# Patient Record
Sex: MALE | Race: ASIAN | NOT HISPANIC OR LATINO | Employment: UNEMPLOYED | URBAN - METROPOLITAN AREA
[De-identification: names, ages, dates, MRNs, and addresses within clinical notes are randomized per-mention and may not be internally consistent; named-entity substitution may affect disease eponyms.]

---

## 2020-06-17 ENCOUNTER — HOSPITAL ENCOUNTER (INPATIENT)
Facility: CLINIC | Age: 20
LOS: 3 days | Discharge: HOME | DRG: 638 | End: 2020-06-20
Attending: FAMILY MEDICINE | Admitting: INTERNAL MEDICINE
Payer: OTHER GOVERNMENT

## 2020-06-17 ENCOUNTER — APPOINTMENT (OUTPATIENT)
Dept: CT IMAGING | Facility: CLINIC | Age: 20
DRG: 638 | End: 2020-06-17
Attending: FAMILY MEDICINE

## 2020-06-17 DIAGNOSIS — E08.10 DIABETIC KETOACIDOSIS WITHOUT COMA ASSOCIATED WITH DIABETES MELLITUS DUE TO UNDERLYING CONDITION (H): ICD-10-CM

## 2020-06-17 DIAGNOSIS — E10.10 DIABETIC KETOACIDOSIS WITHOUT COMA ASSOCIATED WITH TYPE 1 DIABETES MELLITUS (H): ICD-10-CM

## 2020-06-17 DIAGNOSIS — E11.9 NEWLY DIAGNOSED DIABETES (H): Primary | ICD-10-CM

## 2020-06-17 DIAGNOSIS — Z03.818 ENCNTR FOR OBS FOR SUSP EXPSR TO OTH BIOLG AGENTS RULED OUT: ICD-10-CM

## 2020-06-17 PROBLEM — E66.01 MORBID OBESITY (H): Status: ACTIVE | Noted: 2020-06-17

## 2020-06-17 PROBLEM — R79.89 ELEVATED D-DIMER: Status: ACTIVE | Noted: 2020-06-17

## 2020-06-17 LAB
ALBUMIN SERPL-MCNC: 4.7 G/DL (ref 3.4–5)
ALBUMIN UR-MCNC: 100 MG/DL
ALP SERPL-CCNC: 145 U/L (ref 65–260)
ALT SERPL W P-5'-P-CCNC: 96 U/L (ref 0–50)
ANION GAP SERPL CALCULATED.3IONS-SCNC: 21 MMOL/L (ref 3–14)
ANION GAP SERPL CALCULATED.3IONS-SCNC: 24 MMOL/L (ref 3–14)
APPEARANCE UR: CLEAR
AST SERPL W P-5'-P-CCNC: 24 U/L (ref 0–35)
BASE DEFICIT BLDV-SCNC: 21.5 MMOL/L
BASOPHILS # BLD AUTO: 0.1 10E9/L (ref 0–0.2)
BASOPHILS NFR BLD AUTO: 0.5 %
BILIRUB DIRECT SERPL-MCNC: 0.2 MG/DL (ref 0–0.2)
BILIRUB SERPL-MCNC: 0.7 MG/DL (ref 0.2–1.3)
BILIRUB UR QL STRIP: NEGATIVE
BUN SERPL-MCNC: 10 MG/DL (ref 7–30)
BUN SERPL-MCNC: 12 MG/DL (ref 7–30)
CALCIUM SERPL-MCNC: 8.5 MG/DL (ref 8.5–10.1)
CALCIUM SERPL-MCNC: 9.4 MG/DL (ref 8.5–10.1)
CHLORIDE SERPL-SCNC: 101 MMOL/L (ref 98–110)
CHLORIDE SERPL-SCNC: 106 MMOL/L (ref 98–110)
CO2 SERPL-SCNC: 8 MMOL/L (ref 20–32)
CO2 SERPL-SCNC: 9 MMOL/L (ref 20–32)
COLOR UR AUTO: YELLOW
CREAT SERPL-MCNC: 0.72 MG/DL (ref 0.5–1)
CREAT SERPL-MCNC: 0.86 MG/DL (ref 0.5–1)
D DIMER PPP FEU-MCNC: 0.8 UG/ML FEU (ref 0–0.5)
DIFFERENTIAL METHOD BLD: ABNORMAL
EOSINOPHIL # BLD AUTO: 0 10E9/L (ref 0–0.7)
EOSINOPHIL NFR BLD AUTO: 0.1 %
ERYTHROCYTE [DISTWIDTH] IN BLOOD BY AUTOMATED COUNT: 13.1 % (ref 10–15)
GFR SERPL CREATININE-BSD FRML MDRD: >90 ML/MIN/{1.73_M2}
GFR SERPL CREATININE-BSD FRML MDRD: >90 ML/MIN/{1.73_M2}
GLUCOSE BLDC GLUCOMTR-MCNC: 304 MG/DL (ref 70–99)
GLUCOSE BLDC GLUCOMTR-MCNC: 394 MG/DL (ref 70–99)
GLUCOSE BLDC GLUCOMTR-MCNC: 458 MG/DL (ref 70–99)
GLUCOSE BLDC GLUCOMTR-MCNC: 596 MG/DL (ref 70–99)
GLUCOSE SERPL-MCNC: 324 MG/DL (ref 70–99)
GLUCOSE SERPL-MCNC: 574 MG/DL (ref 70–99)
GLUCOSE UR STRIP-MCNC: >499 MG/DL
HBA1C MFR BLD: 11.3 % (ref 0–5.6)
HCO3 BLDV-SCNC: 6 MMOL/L (ref 21–28)
HCO3 BLDV-SCNC: 7 MMOL/L (ref 21–28)
HCT VFR BLD AUTO: 55.4 % (ref 40–53)
HGB BLD-MCNC: 18.2 G/DL (ref 13.3–17.7)
HGB UR QL STRIP: ABNORMAL
IMM GRANULOCYTES # BLD: 0.4 10E9/L (ref 0–0.4)
IMM GRANULOCYTES NFR BLD: 2.4 %
KETONES BLD-SCNC: 4.6 MMOL/L (ref 0–0.6)
KETONES BLD-SCNC: 5.1 MMOL/L (ref 0–0.6)
KETONES UR STRIP-MCNC: 80 MG/DL
LACTATE BLD-SCNC: 1.9 MMOL/L (ref 0.7–2)
LACTATE BLD-SCNC: 2.8 MMOL/L (ref 0.7–2)
LEUKOCYTE ESTERASE UR QL STRIP: NEGATIVE
LYMPHOCYTES # BLD AUTO: 2.1 10E9/L (ref 0.8–5.3)
LYMPHOCYTES NFR BLD AUTO: 12 %
MAGNESIUM SERPL-MCNC: 2.3 MG/DL (ref 1.6–2.3)
MAGNESIUM SERPL-MCNC: 2.7 MG/DL (ref 1.6–2.3)
MCH RBC QN AUTO: 28.6 PG (ref 26.5–33)
MCHC RBC AUTO-ENTMCNC: 32.9 G/DL (ref 31.5–36.5)
MCV RBC AUTO: 87 FL (ref 78–100)
MONOCYTES # BLD AUTO: 1.4 10E9/L (ref 0–1.3)
MONOCYTES NFR BLD AUTO: 8.2 %
MUCOUS THREADS #/AREA URNS LPF: PRESENT /LPF
NEUTROPHILS # BLD AUTO: 13.4 10E9/L (ref 1.6–8.3)
NEUTROPHILS NFR BLD AUTO: 76.8 %
NITRATE UR QL: NEGATIVE
NRBC # BLD AUTO: 0 10*3/UL
NRBC BLD AUTO-RTO: 0 /100
O2/TOTAL GAS SETTING VFR VENT: 21 %
O2/TOTAL GAS SETTING VFR VENT: ABNORMAL %
OSMOLALITY SERPL: 333 MMOL/KG (ref 275–295)
PCO2 BLDV: 18 MM HG (ref 40–50)
PCO2 BLDV: 24 MM HG (ref 40–50)
PH BLDV: 7.09 PH (ref 7.32–7.43)
PH BLDV: 7.12 PH (ref 7.32–7.43)
PH UR STRIP: 5 PH (ref 5–7)
PHOSPHATE SERPL-MCNC: 1.5 MG/DL (ref 2.5–4.5)
PHOSPHATE SERPL-MCNC: 2.9 MG/DL (ref 2.5–4.5)
PLATELET # BLD AUTO: 360 10E9/L (ref 150–450)
PLATELET # BLD AUTO: 436 10E9/L (ref 150–450)
PO2 BLDV: 38 MM HG (ref 25–47)
PO2 BLDV: 52 MM HG (ref 25–47)
POTASSIUM SERPL-SCNC: 3.9 MMOL/L (ref 3.4–5.3)
POTASSIUM SERPL-SCNC: 4.7 MMOL/L (ref 3.4–5.3)
PROT SERPL-MCNC: 8.8 G/DL (ref 6.8–8.8)
RBC # BLD AUTO: 6.36 10E12/L (ref 4.4–5.9)
RBC #/AREA URNS AUTO: 1 /HPF (ref 0–2)
SARS-COV-2 PCR COMMENT: NORMAL
SARS-COV-2 RNA SPEC QL NAA+PROBE: NEGATIVE
SARS-COV-2 RNA SPEC QL NAA+PROBE: NORMAL
SODIUM SERPL-SCNC: 134 MMOL/L (ref 133–144)
SODIUM SERPL-SCNC: 135 MMOL/L (ref 133–144)
SOURCE: ABNORMAL
SP GR UR STRIP: 1.03 (ref 1–1.03)
SPECIMEN SOURCE: NORMAL
SPECIMEN SOURCE: NORMAL
SQUAMOUS #/AREA URNS AUTO: 1 /HPF (ref 0–1)
TROPONIN I SERPL-MCNC: <0.015 UG/L (ref 0–0.04)
UROBILINOGEN UR STRIP-MCNC: 0 MG/DL (ref 0–2)
WBC # BLD AUTO: 17.4 10E9/L (ref 4–11)
WBC #/AREA URNS AUTO: <1 /HPF (ref 0–5)

## 2020-06-17 PROCEDURE — 85049 AUTOMATED PLATELET COUNT: CPT | Performed by: PHYSICIAN ASSISTANT

## 2020-06-17 PROCEDURE — 25800030 ZZH RX IP 258 OP 636: Performed by: FAMILY MEDICINE

## 2020-06-17 PROCEDURE — 86341 ISLET CELL ANTIBODY: CPT | Performed by: PHYSICIAN ASSISTANT

## 2020-06-17 PROCEDURE — 99223 1ST HOSP IP/OBS HIGH 75: CPT | Mod: AI | Performed by: PHYSICIAN ASSISTANT

## 2020-06-17 PROCEDURE — 83930 ASSAY OF BLOOD OSMOLALITY: CPT | Performed by: FAMILY MEDICINE

## 2020-06-17 PROCEDURE — C9803 HOPD COVID-19 SPEC COLLECT: HCPCS | Performed by: FAMILY MEDICINE

## 2020-06-17 PROCEDURE — 83605 ASSAY OF LACTIC ACID: CPT | Performed by: PHYSICIAN ASSISTANT

## 2020-06-17 PROCEDURE — 85025 COMPLETE CBC W/AUTO DIFF WBC: CPT | Performed by: FAMILY MEDICINE

## 2020-06-17 PROCEDURE — 80048 BASIC METABOLIC PNL TOTAL CA: CPT | Performed by: FAMILY MEDICINE

## 2020-06-17 PROCEDURE — 99285 EMERGENCY DEPT VISIT HI MDM: CPT | Mod: 25 | Performed by: FAMILY MEDICINE

## 2020-06-17 PROCEDURE — 85379 FIBRIN DEGRADATION QUANT: CPT | Performed by: FAMILY MEDICINE

## 2020-06-17 PROCEDURE — 84100 ASSAY OF PHOSPHORUS: CPT | Performed by: FAMILY MEDICINE

## 2020-06-17 PROCEDURE — 25000128 H RX IP 250 OP 636: Performed by: INTERNAL MEDICINE

## 2020-06-17 PROCEDURE — 36415 COLL VENOUS BLD VENIPUNCTURE: CPT | Performed by: PHYSICIAN ASSISTANT

## 2020-06-17 PROCEDURE — 82803 BLOOD GASES ANY COMBINATION: CPT | Performed by: PHYSICIAN ASSISTANT

## 2020-06-17 PROCEDURE — 82803 BLOOD GASES ANY COMBINATION: CPT | Performed by: FAMILY MEDICINE

## 2020-06-17 PROCEDURE — 20000003 ZZH R&B ICU

## 2020-06-17 PROCEDURE — 93005 ELECTROCARDIOGRAM TRACING: CPT | Performed by: FAMILY MEDICINE

## 2020-06-17 PROCEDURE — 96366 THER/PROPH/DIAG IV INF ADDON: CPT | Performed by: FAMILY MEDICINE

## 2020-06-17 PROCEDURE — 25000131 ZZH RX MED GY IP 250 OP 636 PS 637: Performed by: FAMILY MEDICINE

## 2020-06-17 PROCEDURE — 83735 ASSAY OF MAGNESIUM: CPT | Performed by: FAMILY MEDICINE

## 2020-06-17 PROCEDURE — 25000128 H RX IP 250 OP 636: Performed by: PHYSICIAN ASSISTANT

## 2020-06-17 PROCEDURE — 25800029 ZZH RX IP 258 OP 250: Performed by: FAMILY MEDICINE

## 2020-06-17 PROCEDURE — 25000125 ZZHC RX 250: Performed by: FAMILY MEDICINE

## 2020-06-17 PROCEDURE — 99285 EMERGENCY DEPT VISIT HI MDM: CPT | Mod: Z6 | Performed by: FAMILY MEDICINE

## 2020-06-17 PROCEDURE — 96365 THER/PROPH/DIAG IV INF INIT: CPT | Mod: 59 | Performed by: FAMILY MEDICINE

## 2020-06-17 PROCEDURE — 71275 CT ANGIOGRAPHY CHEST: CPT

## 2020-06-17 PROCEDURE — 82010 KETONE BODYS QUAN: CPT | Performed by: FAMILY MEDICINE

## 2020-06-17 PROCEDURE — 83735 ASSAY OF MAGNESIUM: CPT | Performed by: PHYSICIAN ASSISTANT

## 2020-06-17 PROCEDURE — 80076 HEPATIC FUNCTION PANEL: CPT | Performed by: PHYSICIAN ASSISTANT

## 2020-06-17 PROCEDURE — U0003 INFECTIOUS AGENT DETECTION BY NUCLEIC ACID (DNA OR RNA); SEVERE ACUTE RESPIRATORY SYNDROME CORONAVIRUS 2 (SARS-COV-2) (CORONAVIRUS DISEASE [COVID-19]), AMPLIFIED PROBE TECHNIQUE, MAKING USE OF HIGH THROUGHPUT TECHNOLOGIES AS DESCRIBED BY CMS-2020-01-R: HCPCS | Performed by: FAMILY MEDICINE

## 2020-06-17 PROCEDURE — 83605 ASSAY OF LACTIC ACID: CPT | Performed by: FAMILY MEDICINE

## 2020-06-17 PROCEDURE — 80048 BASIC METABOLIC PNL TOTAL CA: CPT | Performed by: PHYSICIAN ASSISTANT

## 2020-06-17 PROCEDURE — 00000146 ZZHCL STATISTIC GLUCOSE BY METER IP

## 2020-06-17 PROCEDURE — 25000128 H RX IP 250 OP 636: Performed by: FAMILY MEDICINE

## 2020-06-17 PROCEDURE — 84100 ASSAY OF PHOSPHORUS: CPT | Performed by: PHYSICIAN ASSISTANT

## 2020-06-17 PROCEDURE — 82010 KETONE BODYS QUAN: CPT | Performed by: PHYSICIAN ASSISTANT

## 2020-06-17 PROCEDURE — 83036 HEMOGLOBIN GLYCOSYLATED A1C: CPT | Performed by: FAMILY MEDICINE

## 2020-06-17 PROCEDURE — 81001 URINALYSIS AUTO W/SCOPE: CPT | Performed by: PHYSICIAN ASSISTANT

## 2020-06-17 PROCEDURE — 84484 ASSAY OF TROPONIN QUANT: CPT | Performed by: FAMILY MEDICINE

## 2020-06-17 PROCEDURE — 96361 HYDRATE IV INFUSION ADD-ON: CPT | Performed by: FAMILY MEDICINE

## 2020-06-17 RX ORDER — NALOXONE HYDROCHLORIDE 0.4 MG/ML
.1-.4 INJECTION, SOLUTION INTRAMUSCULAR; INTRAVENOUS; SUBCUTANEOUS
Status: DISCONTINUED | OUTPATIENT
Start: 2020-06-17 | End: 2020-06-20 | Stop reason: HOSPADM

## 2020-06-17 RX ORDER — AMOXICILLIN 250 MG
2 CAPSULE ORAL 2 TIMES DAILY PRN
Status: DISCONTINUED | OUTPATIENT
Start: 2020-06-17 | End: 2020-06-20 | Stop reason: HOSPADM

## 2020-06-17 RX ORDER — NICOTINE POLACRILEX 4 MG
15-30 LOZENGE BUCCAL
Status: DISCONTINUED | OUTPATIENT
Start: 2020-06-17 | End: 2020-06-19

## 2020-06-17 RX ORDER — DEXTROSE MONOHYDRATE 25 G/50ML
25-50 INJECTION, SOLUTION INTRAVENOUS
Status: CANCELLED | OUTPATIENT
Start: 2020-06-17

## 2020-06-17 RX ORDER — DEXTROSE MONOHYDRATE, SODIUM CHLORIDE, AND POTASSIUM CHLORIDE 50; 1.49; 4.5 G/1000ML; G/1000ML; G/1000ML
INJECTION, SOLUTION INTRAVENOUS CONTINUOUS
Status: DISCONTINUED | OUTPATIENT
Start: 2020-06-17 | End: 2020-06-19

## 2020-06-17 RX ORDER — DEXTROSE MONOHYDRATE 25 G/50ML
25-50 INJECTION, SOLUTION INTRAVENOUS
Status: DISCONTINUED | OUTPATIENT
Start: 2020-06-17 | End: 2020-06-17

## 2020-06-17 RX ORDER — HYDROMORPHONE HYDROCHLORIDE 1 MG/ML
.3-.5 INJECTION, SOLUTION INTRAMUSCULAR; INTRAVENOUS; SUBCUTANEOUS
Status: DISCONTINUED | OUTPATIENT
Start: 2020-06-17 | End: 2020-06-20 | Stop reason: HOSPADM

## 2020-06-17 RX ORDER — AMOXICILLIN 250 MG
1 CAPSULE ORAL 2 TIMES DAILY PRN
Status: DISCONTINUED | OUTPATIENT
Start: 2020-06-17 | End: 2020-06-20 | Stop reason: HOSPADM

## 2020-06-17 RX ORDER — SODIUM CHLORIDE AND POTASSIUM CHLORIDE 150; 450 MG/100ML; MG/100ML
INJECTION, SOLUTION INTRAVENOUS CONTINUOUS
Status: DISCONTINUED | OUTPATIENT
Start: 2020-06-17 | End: 2020-06-19

## 2020-06-17 RX ORDER — IOPAMIDOL 755 MG/ML
96 INJECTION, SOLUTION INTRAVASCULAR ONCE
Status: COMPLETED | OUTPATIENT
Start: 2020-06-17 | End: 2020-06-17

## 2020-06-17 RX ORDER — ONDANSETRON 2 MG/ML
4 INJECTION INTRAMUSCULAR; INTRAVENOUS EVERY 30 MIN PRN
Status: CANCELLED | OUTPATIENT
Start: 2020-06-17

## 2020-06-17 RX ORDER — PROCHLORPERAZINE 25 MG
25 SUPPOSITORY, RECTAL RECTAL EVERY 12 HOURS PRN
Status: DISCONTINUED | OUTPATIENT
Start: 2020-06-17 | End: 2020-06-20 | Stop reason: HOSPADM

## 2020-06-17 RX ORDER — DEXTROSE MONOHYDRATE 25 G/50ML
25-50 INJECTION, SOLUTION INTRAVENOUS
Status: DISCONTINUED | OUTPATIENT
Start: 2020-06-17 | End: 2020-06-19

## 2020-06-17 RX ORDER — PROCHLORPERAZINE MALEATE 10 MG
10 TABLET ORAL EVERY 6 HOURS PRN
Status: DISCONTINUED | OUTPATIENT
Start: 2020-06-17 | End: 2020-06-20 | Stop reason: HOSPADM

## 2020-06-17 RX ORDER — SODIUM CHLORIDE 450 MG/100ML
1000 INJECTION, SOLUTION INTRAVENOUS CONTINUOUS
Status: DISCONTINUED | OUTPATIENT
Start: 2020-06-17 | End: 2020-06-17

## 2020-06-17 RX ORDER — ONDANSETRON 2 MG/ML
4 INJECTION INTRAMUSCULAR; INTRAVENOUS EVERY 6 HOURS PRN
Status: DISCONTINUED | OUTPATIENT
Start: 2020-06-17 | End: 2020-06-20 | Stop reason: HOSPADM

## 2020-06-17 RX ORDER — ONDANSETRON 2 MG/ML
4 INJECTION INTRAMUSCULAR; INTRAVENOUS EVERY 30 MIN PRN
Status: DISCONTINUED | OUTPATIENT
Start: 2020-06-17 | End: 2020-06-17

## 2020-06-17 RX ORDER — ONDANSETRON 4 MG/1
4 TABLET, ORALLY DISINTEGRATING ORAL EVERY 6 HOURS PRN
Status: DISCONTINUED | OUTPATIENT
Start: 2020-06-17 | End: 2020-06-20 | Stop reason: HOSPADM

## 2020-06-17 RX ORDER — POLYETHYLENE GLYCOL 3350 17 G/17G
17 POWDER, FOR SOLUTION ORAL DAILY PRN
Status: DISCONTINUED | OUTPATIENT
Start: 2020-06-17 | End: 2020-06-20 | Stop reason: HOSPADM

## 2020-06-17 RX ADMIN — SODIUM CHLORIDE 1000 ML: 9 INJECTION, SOLUTION INTRAVENOUS at 17:28

## 2020-06-17 RX ADMIN — SODIUM CHLORIDE 1000 ML: 4.5 INJECTION, SOLUTION INTRAVENOUS at 18:12

## 2020-06-17 RX ADMIN — POTASSIUM CHLORIDE AND SODIUM CHLORIDE: 450; 150 INJECTION, SOLUTION INTRAVENOUS at 23:14

## 2020-06-17 RX ADMIN — IOPAMIDOL 96 ML: 755 INJECTION, SOLUTION INTRAVENOUS at 19:44

## 2020-06-17 RX ADMIN — SODIUM CHLORIDE: 9 INJECTION, SOLUTION INTRAVENOUS at 18:08

## 2020-06-17 RX ADMIN — SODIUM CHLORIDE 100 ML: 9 INJECTION, SOLUTION INTRAVENOUS at 19:45

## 2020-06-17 RX ADMIN — ENOXAPARIN SODIUM 40 MG: 40 INJECTION SUBCUTANEOUS at 23:14

## 2020-06-17 SDOH — HEALTH STABILITY: MENTAL HEALTH: HOW OFTEN DO YOU HAVE A DRINK CONTAINING ALCOHOL?: 2-4 TIMES A MONTH

## 2020-06-17 ASSESSMENT — ENCOUNTER SYMPTOMS
HEMATOLOGIC/LYMPHATIC NEGATIVE: 1
CHILLS: 1
MUSCULOSKELETAL NEGATIVE: 1
FEVER: 0
DIAPHORESIS: 1
UNEXPECTED WEIGHT CHANGE: 0
ACTIVITY CHANGE: 1
GASTROINTESTINAL NEGATIVE: 1
FATIGUE: 1
SHORTNESS OF BREATH: 1
ENDOCRINE NEGATIVE: 1
LIGHT-HEADEDNESS: 1
ALLERGIC/IMMUNOLOGIC NEGATIVE: 1
APPETITE CHANGE: 1
PSYCHIATRIC NEGATIVE: 1
EYE PAIN: 1

## 2020-06-17 ASSESSMENT — ACTIVITIES OF DAILY LIVING (ADL)
SWALLOWING: 0-->SWALLOWS FOODS/LIQUIDS WITHOUT DIFFICULTY
AMBULATION: 0-->INDEPENDENT
BATHING: 0-->INDEPENDENT
TRANSFERRING: 0-->INDEPENDENT
TOILETING: 0-->INDEPENDENT
FALL_HISTORY_WITHIN_LAST_SIX_MONTHS: NO
COGNITION: 0 - NO COGNITION ISSUES REPORTED
RETIRED_EATING: 0-->INDEPENDENT
RETIRED_COMMUNICATION: 0-->UNDERSTANDS/COMMUNICATES WITHOUT DIFFICULTY
DRESS: 0-->INDEPENDENT

## 2020-06-17 ASSESSMENT — MIFFLIN-ST. JEOR
SCORE: 2269.63
SCORE: 2352.56

## 2020-06-17 NOTE — ED NOTES
Pt here with tachycardia, chest pain and shortness of breath for the past 3 days. Also states that for the past week he has been excessively thirsty, drinking a lot of water and urinating frequently. Father is diabetic. Denies cough, fevers or rash.

## 2020-06-17 NOTE — ED NOTES
Obtained venous blood glucose, came back as 596. Doctor and nurse were in the room and noticed immediately

## 2020-06-17 NOTE — ED PROVIDER NOTES
History     Chief Complaint   Patient presents with     Tachycardia     pt states he's been drinking a lot lately and urinating a lot. has a dry mouth. does have chest pain and SOA with HR of 140s. family hx of diabetes     HPI  Mynor Rollins is a 19 year old male, past medical history is significant for obesity, presents to the emergency department with concerns of rapid heartbeat, chest pain, shortness of breath x3 days.  History is obtained from the patient who states that the past 1 to 2 weeks he has been drinking a lot of water and other liquids as well as urinating profusely, he always seems to be dry and have a dry mouth.  Over the last 3 days beginning while he was playing a video game he started with central dull achy chest pain that has never gone below a 7/10.  It is associated with increased shortness of breath especially with exertion which is new over the last 3 days.  He notes no cough, no fever, no chills or sweats.  He is taken nothing for the pain.  He denies any trauma or injury to his chest.  He has not had symptoms like this before.  He notes that his father is a type II diabetic on insulin.  He notes no urinary urgency, dysuria or hematuria.  His urine has always appeared pale yellow.  Identified no abdominal pain, formed bowel movement yesterday no change in bowel habits.      Allergies:  No Known Allergies    Problem List:    There are no active problems to display for this patient.       Past Medical History:    No past medical history on file.    Past Surgical History:    No past surgical history on file.    Family History:    No family history on file.    Social History:  Marital Status:  Single [1]  Social History     Tobacco Use     Smoking status: Not on file   Substance Use Topics     Alcohol use: Not on file     Drug use: Not on file        Medications:    No current outpatient medications on file.        Review of Systems   Constitutional: Positive for activity change, appetite  "change, chills, diaphoresis and fatigue. Negative for fever and unexpected weight change.   HENT: Negative.    Eyes: Positive for pain.        Eye discomfort not pain, not really itchy.   Respiratory: Positive for shortness of breath.    Cardiovascular: Positive for chest pain.   Gastrointestinal: Negative.    Endocrine: Negative.    Genitourinary: Negative.    Musculoskeletal: Negative.    Skin: Negative.    Allergic/Immunologic: Negative.    Neurological: Positive for light-headedness.   Hematological: Negative.    Psychiatric/Behavioral: Negative.        Physical Exam   BP: (!) 158/116  Pulse: 145  Heart Rate: 149  Temp: 98  F (36.7  C)  Resp: 22  Height: 170.2 cm (5' 7\")  Weight: 137.9 kg (304 lb)(stated)  SpO2: 98 %      Physical Exam  Vitals signs and nursing note reviewed.   Constitutional:       General: He is in acute distress.      Appearance: He is obese. He is ill-appearing and diaphoretic.      Comments: Alert, ill in appearance, bilateral ciliary flush.  Oriented x3, GCS 15.   HENT:      Head: Normocephalic and atraumatic.      Right Ear: Tympanic membrane, ear canal and external ear normal.      Left Ear: Tympanic membrane, ear canal and external ear normal.      Nose: Nose normal.      Mouth/Throat:      Mouth: Mucous membranes are dry.      Pharynx: Oropharynx is clear.   Eyes:      Extraocular Movements: Extraocular movements intact.      Conjunctiva/sclera: Conjunctivae normal.      Pupils: Pupils are equal, round, and reactive to light.   Neck:      Musculoskeletal: Normal range of motion and neck supple.   Cardiovascular:      Rate and Rhythm: Regular rhythm. Tachycardia present.      Pulses: Normal pulses.      Heart sounds: Normal heart sounds.   Pulmonary:      Effort: Pulmonary effort is normal.      Breath sounds: Normal breath sounds.   Abdominal:      Palpations: Abdomen is soft.      Comments: Obese abdomen active bowel sounds nontender.   Musculoskeletal: Normal range of motion. "   Skin:     General: Skin is warm.      Capillary Refill: Capillary refill takes less than 2 seconds.   Neurological:      General: No focal deficit present.      Mental Status: He is alert and oriented to person, place, and time. Mental status is at baseline.   Psychiatric:         Mood and Affect: Mood normal.         Behavior: Behavior normal.         ED Course        Procedures               Critical Care time:  none               Results for orders placed or performed during the hospital encounter of 06/17/20 (from the past 24 hour(s))   Glucose by meter   Result Value Ref Range    Glucose 596 (HH) 70 - 99 mg/dL   CBC with platelets differential   Result Value Ref Range    WBC 17.4 (H) 4.0 - 11.0 10e9/L    RBC Count 6.36 (H) 4.4 - 5.9 10e12/L    Hemoglobin 18.2 (H) 13.3 - 17.7 g/dL    Hematocrit 55.4 (H) 40.0 - 53.0 %    MCV 87 78 - 100 fl    MCH 28.6 26.5 - 33.0 pg    MCHC 32.9 31.5 - 36.5 g/dL    RDW 13.1 10.0 - 15.0 %    Platelet Count 436 150 - 450 10e9/L    Diff Method Automated Method     % Neutrophils 76.8 %    % Lymphocytes 12.0 %    % Monocytes 8.2 %    % Eosinophils 0.1 %    % Basophils 0.5 %    % Immature Granulocytes 2.4 %    Nucleated RBCs 0 0 /100    Absolute Neutrophil 13.4 (H) 1.6 - 8.3 10e9/L    Absolute Lymphocytes 2.1 0.8 - 5.3 10e9/L    Absolute Monocytes 1.4 (H) 0.0 - 1.3 10e9/L    Absolute Eosinophils 0.0 0.0 - 0.7 10e9/L    Absolute Basophils 0.1 0.0 - 0.2 10e9/L    Abs Immature Granulocytes 0.4 0 - 0.4 10e9/L    Absolute Nucleated RBC 0.0    Hemoglobin A1c   Result Value Ref Range    Hemoglobin A1C 11.3 (H) 0 - 5.6 %   Phosphorus   Result Value Ref Range    Phosphorus 2.9 2.5 - 4.5 mg/dL   Magnesium   Result Value Ref Range    Magnesium 2.7 (H) 1.6 - 2.3 mg/dL   Lactic acid whole blood   Result Value Ref Range    Lactic Acid 2.8 (H) 0.7 - 2.0 mmol/L   Troponin I   Result Value Ref Range    Troponin I ES <0.015 0.000 - 0.045 ug/L   Blood gas venous   Result Value Ref Range    Ph Venous  7.09 (LL) 7.32 - 7.43 pH    PCO2 Venous 24 (L) 40 - 50 mm Hg    PO2 Venous 38 25 - 47 mm Hg    Bicarbonate Venous 7 (LL) 21 - 28 mmol/L    FIO2 HIDE    Osmolality   Result Value Ref Range    Osmolality 333 (H) 275 - 295 mmol/kg   Ketone Beta-Hydroxybutyrate Quantitative   Result Value Ref Range    Ketone Quantitative 4.6 (HH) 0.0 - 0.6 mmol/L   Basic metabolic panel   Result Value Ref Range    Sodium 134 133 - 144 mmol/L    Potassium 4.7 3.4 - 5.3 mmol/L    Chloride 101 98 - 110 mmol/L    Carbon Dioxide 9 (LL) 20 - 32 mmol/L    Anion Gap 24 (H) 3 - 14 mmol/L    Glucose 574 (HH) 70 - 99 mg/dL    Urea Nitrogen 12 7 - 30 mg/dL    Creatinine 0.86 0.50 - 1.00 mg/dL    GFR Estimate >90 >60 mL/min/[1.73_m2]    GFR Estimate If Black >90 >60 mL/min/[1.73_m2]    Calcium 9.4 8.5 - 10.1 mg/dL   Symptomatic COVID-19 Virus (Coronavirus) by PCR    Specimen: Nasopharyngeal   Result Value Ref Range    COVID-19 Virus PCR to [facility] - Source Nasopharyngeal     COVID-19 Virus PCR to [facility] - Result       Test received-See reflex to IDDL test SARS CoV2 (COVID-19) Virus RT-PCR   SARS-CoV-2 COVID-19 Virus (Coronavirus) RT-PCR Nasopharyngeal    Specimen: Nasopharyngeal   Result Value Ref Range    SARS-CoV-2 Virus Specimen Source Nasopharyngeal     SARS-CoV-2 PCR Result NEGATIVE     SARS-CoV-2 PCR Comment       This automated, real-time RT-PCR assay by [employer] on the [employer] has   been given Emergency Use Authorization (EUA) for the in vitro qualitative detection of RNA   from the SARS-CoV2 virus in nasopharyngeal swabs in viral transport medium from patients   with signs and symptoms of infection who are suspected of COVID-19. The performance is   unknown in asymptomatic patients.     D dimer quantitative   Result Value Ref Range    D Dimer 0.8 (H) 0.0 - 0.50 ug/ml FEU   Glucose by meter   Result Value Ref Range    Glucose 458 (H) 70 - 99 mg/dL   CT Chest Pulmonary Embolism w Contrast    Narrative    EXAM: CT CHEST  PULMONARY EMBOLISM W CONTRAST  LOCATION: [facility]  DATE/TIME: 6/17/2020 7:37 PM    INDICATION: Chest pain, shortness of breath, DKA  COMPARISON: None.  TECHNIQUE: CT chest pulmonary angiogram during arterial phase injection of IV contrast. Multiplanar reformats and MIP reconstructions were performed. Dose reduction techniques were used.   CONTRAST: 96 ml Isovue 370    FINDINGS:  ANGIOGRAM CHEST: Pulmonary arteries are normal caliber and negative for pulmonary emboli. Thoracic aorta is negative for dissection. No CT evidence of right heart strain.    LUNGS AND PLEURA: Evidence for remote granulomatous disease. No infiltrate or effusion.    MEDIASTINUM/AXILLAE: Normal.    UPPER ABDOMEN: Dense hepatic steatosis.    MUSCULOSKELETAL: Normal.      Impression    IMPRESSION:  1.  Dense hepatic steatosis.  2.  No pulmonary embolus, aortic aneurysm or dissection.       Medications   dextrose 5% and 0.45% NaCl infusion (has no administration in time range)   dextrose 50 % injection 25-50 mL (has no administration in time range)   0.9% sodium chloride BOLUS (0 mLs Intravenous Stopped 6/17/20 1812)     Followed by   0.45% sodium chloride infusion ( Intravenous Restarted 6/17/20 2017)   ondansetron (ZOFRAN) injection 4 mg (has no administration in time range)   insulin 1 unit/1 mL in NS (NovoLIN, HumuLIN Regular) drip -ED DKA algorithm (5.5 Units/hr Intravenous Restarted 6/17/20 2011)   dextrose 50 % injection 25-50 mL (has no administration in time range)   iopamidol (ISOVUE-370) solution 96 mL (96 mLs Intravenous Given 6/17/20 1944)   sodium chloride 0.9 % bag 500mL for CT scan flush use (100 mLs As instructed Given 6/17/20 1945)     9:42 PM  I spoke with the patient regarding all of his diagnostics performed at this point.  He is feeling significantly better after fluids and insulin infusion here in the emergency department.  I recommended admission to him and discussed him with the on-call hospitalist  [facility] guard who is agreeable to accepting his care for on-call hospitalist  to the ICU.  Transition orders were placed by myself.    Assessments & Plan (with Medical Decision Making)   19-year-old male past medical history significant for obesity who presents to the emergency department with approximately 2 weeks of polyuria/polydipsia as well as some associated more recent development of rapid heartbeat, chest pain and shortness of breath.  The patient was initially seen in a normal emergency department room but then after his evaluation was moved to PCU I until COVID swab testing was obtained and resulted as verbally negative by lab.  His lab diagnostics are reviewed as above revealing significant metabolic acidosis, anion gap, ketonemia, hyperglycemia all of which is consistent with DKA.  Orders were placed and his evaluation proceeded to include CT of the chest as well given his chest pain, EKG and cardiac troponin all of which were negative for acute process.  He was discussed with our hospitalist service here for admission for ongoing management of his metabolic crisis.  Transition orders were placed by myself in the emergency department.      Disclaimer: This note consists of symbols derived from keyboarding, dictation and/or voice recognition software. As a result, there may be errors in the script that have gone undetected. Please consider this when interpreting information found in this chart.      I have reviewed the nursing notes.    I have reviewed the findings, diagnosis, plan and need for follow up with the patient.       New Prescriptions    No medications on file       Final diagnoses:   Diabetic ketoacidosis without coma associated with type 1 diabetes mellitus (H)       6/17/2020   [facility] EMERGENCY DEPARTMENT     Chandan Chapman MD  06/17/20 4431

## 2020-06-18 ENCOUNTER — DOCUMENTATION ONLY (OUTPATIENT)
Dept: OTHER | Facility: CLINIC | Age: 20
End: 2020-06-18

## 2020-06-18 LAB
ANION GAP SERPL CALCULATED.3IONS-SCNC: 11 MMOL/L (ref 3–14)
ANION GAP SERPL CALCULATED.3IONS-SCNC: 15 MMOL/L (ref 3–14)
ANION GAP SERPL CALCULATED.3IONS-SCNC: 15 MMOL/L (ref 3–14)
ANION GAP SERPL CALCULATED.3IONS-SCNC: 16 MMOL/L (ref 3–14)
ANION GAP SERPL CALCULATED.3IONS-SCNC: 18 MMOL/L (ref 3–14)
BASE DEFICIT BLDV-SCNC: 19 MMOL/L
BUN SERPL-MCNC: 7 MG/DL (ref 7–30)
BUN SERPL-MCNC: 8 MG/DL (ref 7–30)
BUN SERPL-MCNC: 8 MG/DL (ref 7–30)
BUN SERPL-MCNC: 9 MG/DL (ref 7–30)
CALCIUM SERPL-MCNC: 8.2 MG/DL (ref 8.5–10.1)
CALCIUM SERPL-MCNC: 8.3 MG/DL (ref 8.5–10.1)
CALCIUM SERPL-MCNC: 8.3 MG/DL (ref 8.5–10.1)
CALCIUM SERPL-MCNC: 8.5 MG/DL (ref 8.5–10.1)
CALCIUM SERPL-MCNC: 8.6 MG/DL (ref 8.5–10.1)
CHLORIDE SERPL-SCNC: 106 MMOL/L (ref 98–110)
CHLORIDE SERPL-SCNC: 108 MMOL/L (ref 98–110)
CHLORIDE SERPL-SCNC: 108 MMOL/L (ref 98–110)
CHLORIDE SERPL-SCNC: 109 MMOL/L (ref 98–110)
CHLORIDE SERPL-SCNC: 110 MMOL/L (ref 98–110)
CO2 SERPL-SCNC: 10 MMOL/L (ref 20–32)
CO2 SERPL-SCNC: 11 MMOL/L (ref 20–32)
CO2 SERPL-SCNC: 11 MMOL/L (ref 20–32)
CO2 SERPL-SCNC: 13 MMOL/L (ref 20–32)
CO2 SERPL-SCNC: 13 MMOL/L (ref 20–32)
CO2 SERPL-SCNC: 15 MMOL/L (ref 20–32)
CO2 SERPL-SCNC: 9 MMOL/L (ref 20–32)
CREAT SERPL-MCNC: 0.53 MG/DL (ref 0.5–1)
CREAT SERPL-MCNC: 0.54 MG/DL (ref 0.5–1)
CREAT SERPL-MCNC: 0.57 MG/DL (ref 0.5–1)
CREAT SERPL-MCNC: 0.6 MG/DL (ref 0.5–1)
CREAT SERPL-MCNC: 0.61 MG/DL (ref 0.5–1)
CREAT SERPL-MCNC: 0.61 MG/DL (ref 0.5–1)
CREAT SERPL-MCNC: 0.68 MG/DL (ref 0.5–1)
ERYTHROCYTE [DISTWIDTH] IN BLOOD BY AUTOMATED COUNT: 12.9 % (ref 10–15)
GFR SERPL CREATININE-BSD FRML MDRD: >90 ML/MIN/{1.73_M2}
GLUCOSE BLDC GLUCOMTR-MCNC: 184 MG/DL (ref 70–99)
GLUCOSE BLDC GLUCOMTR-MCNC: 204 MG/DL (ref 70–99)
GLUCOSE BLDC GLUCOMTR-MCNC: 207 MG/DL (ref 70–99)
GLUCOSE BLDC GLUCOMTR-MCNC: 225 MG/DL (ref 70–99)
GLUCOSE BLDC GLUCOMTR-MCNC: 228 MG/DL (ref 70–99)
GLUCOSE BLDC GLUCOMTR-MCNC: 242 MG/DL (ref 70–99)
GLUCOSE BLDC GLUCOMTR-MCNC: 247 MG/DL (ref 70–99)
GLUCOSE BLDC GLUCOMTR-MCNC: 247 MG/DL (ref 70–99)
GLUCOSE BLDC GLUCOMTR-MCNC: 257 MG/DL (ref 70–99)
GLUCOSE BLDC GLUCOMTR-MCNC: 261 MG/DL (ref 70–99)
GLUCOSE BLDC GLUCOMTR-MCNC: 263 MG/DL (ref 70–99)
GLUCOSE BLDC GLUCOMTR-MCNC: 268 MG/DL (ref 70–99)
GLUCOSE BLDC GLUCOMTR-MCNC: 270 MG/DL (ref 70–99)
GLUCOSE BLDC GLUCOMTR-MCNC: 272 MG/DL (ref 70–99)
GLUCOSE BLDC GLUCOMTR-MCNC: 274 MG/DL (ref 70–99)
GLUCOSE BLDC GLUCOMTR-MCNC: 283 MG/DL (ref 70–99)
GLUCOSE BLDC GLUCOMTR-MCNC: 283 MG/DL (ref 70–99)
GLUCOSE BLDC GLUCOMTR-MCNC: 298 MG/DL (ref 70–99)
GLUCOSE BLDC GLUCOMTR-MCNC: 322 MG/DL (ref 70–99)
GLUCOSE BLDC GLUCOMTR-MCNC: 328 MG/DL (ref 70–99)
GLUCOSE BLDC GLUCOMTR-MCNC: 407 MG/DL (ref 70–99)
GLUCOSE SERPL-MCNC: 190 MG/DL (ref 70–99)
GLUCOSE SERPL-MCNC: 220 MG/DL (ref 70–99)
GLUCOSE SERPL-MCNC: 265 MG/DL (ref 70–99)
GLUCOSE SERPL-MCNC: 269 MG/DL (ref 70–99)
GLUCOSE SERPL-MCNC: 270 MG/DL (ref 70–99)
GLUCOSE SERPL-MCNC: 305 MG/DL (ref 70–99)
GLUCOSE SERPL-MCNC: 324 MG/DL (ref 70–99)
HCO3 BLDV-SCNC: 8 MMOL/L (ref 21–28)
HCT VFR BLD AUTO: 47.7 % (ref 40–53)
HGB BLD-MCNC: 15.9 G/DL (ref 13.3–17.7)
KETONES BLD-SCNC: 0.8 MMOL/L (ref 0–0.6)
KETONES BLD-SCNC: 2.8 MMOL/L (ref 0–0.6)
KETONES BLD-SCNC: 3.4 MMOL/L (ref 0–0.6)
KETONES BLD-SCNC: 4.5 MMOL/L (ref 0–0.6)
KETONES BLD-SCNC: 4.7 MMOL/L (ref 0–0.6)
KETONES BLD-SCNC: 5.1 MMOL/L (ref 0–0.6)
KETONES BLD-SCNC: 5.5 MMOL/L (ref 0–0.6)
LACTATE BLD-SCNC: 0.8 MMOL/L (ref 0.7–2)
LACTATE BLD-SCNC: 1.1 MMOL/L (ref 0.7–2)
MCH RBC QN AUTO: 28.6 PG (ref 26.5–33)
MCHC RBC AUTO-ENTMCNC: 33.3 G/DL (ref 31.5–36.5)
MCV RBC AUTO: 86 FL (ref 78–100)
O2/TOTAL GAS SETTING VFR VENT: 21 %
PCO2 BLDV: 21 MM HG (ref 40–50)
PH BLDV: 7.16 PH (ref 7.32–7.43)
PHOSPHATE SERPL-MCNC: 1.4 MG/DL (ref 2.5–4.5)
PLATELET # BLD AUTO: 288 10E9/L (ref 150–450)
PO2 BLDV: 47 MM HG (ref 25–47)
POTASSIUM SERPL-SCNC: 3.4 MMOL/L (ref 3.4–5.3)
POTASSIUM SERPL-SCNC: 3.6 MMOL/L (ref 3.4–5.3)
POTASSIUM SERPL-SCNC: 3.6 MMOL/L (ref 3.4–5.3)
POTASSIUM SERPL-SCNC: 3.7 MMOL/L (ref 3.4–5.3)
POTASSIUM SERPL-SCNC: 3.8 MMOL/L (ref 3.4–5.3)
POTASSIUM SERPL-SCNC: 3.9 MMOL/L (ref 3.4–5.3)
POTASSIUM SERPL-SCNC: 3.9 MMOL/L (ref 3.4–5.3)
RBC # BLD AUTO: 5.56 10E12/L (ref 4.4–5.9)
SODIUM SERPL-SCNC: 130 MMOL/L (ref 133–144)
SODIUM SERPL-SCNC: 130 MMOL/L (ref 133–144)
SODIUM SERPL-SCNC: 132 MMOL/L (ref 133–144)
SODIUM SERPL-SCNC: 134 MMOL/L (ref 133–144)
SODIUM SERPL-SCNC: 134 MMOL/L (ref 133–144)
SODIUM SERPL-SCNC: 136 MMOL/L (ref 133–144)
SODIUM SERPL-SCNC: 136 MMOL/L (ref 133–144)
WBC # BLD AUTO: 13.4 10E9/L (ref 4–11)

## 2020-06-18 PROCEDURE — 99232 SBSQ HOSP IP/OBS MODERATE 35: CPT | Performed by: INTERNAL MEDICINE

## 2020-06-18 PROCEDURE — 25000128 H RX IP 250 OP 636: Performed by: INTERNAL MEDICINE

## 2020-06-18 PROCEDURE — 36415 COLL VENOUS BLD VENIPUNCTURE: CPT | Performed by: PHYSICIAN ASSISTANT

## 2020-06-18 PROCEDURE — 80048 BASIC METABOLIC PNL TOTAL CA: CPT | Performed by: PHYSICIAN ASSISTANT

## 2020-06-18 PROCEDURE — 82010 KETONE BODYS QUAN: CPT | Performed by: PHYSICIAN ASSISTANT

## 2020-06-18 PROCEDURE — 25000131 ZZH RX MED GY IP 250 OP 636 PS 637: Performed by: PHYSICIAN ASSISTANT

## 2020-06-18 PROCEDURE — 25800030 ZZH RX IP 258 OP 636: Performed by: INTERNAL MEDICINE

## 2020-06-18 PROCEDURE — 84100 ASSAY OF PHOSPHORUS: CPT | Performed by: PHYSICIAN ASSISTANT

## 2020-06-18 PROCEDURE — 83605 ASSAY OF LACTIC ACID: CPT | Performed by: PHYSICIAN ASSISTANT

## 2020-06-18 PROCEDURE — 25000128 H RX IP 250 OP 636: Performed by: PHYSICIAN ASSISTANT

## 2020-06-18 PROCEDURE — 20000003 ZZH R&B ICU

## 2020-06-18 PROCEDURE — 25000131 ZZH RX MED GY IP 250 OP 636 PS 637: Performed by: INTERNAL MEDICINE

## 2020-06-18 PROCEDURE — 25800030 ZZH RX IP 258 OP 636: Performed by: PHYSICIAN ASSISTANT

## 2020-06-18 PROCEDURE — 80048 BASIC METABOLIC PNL TOTAL CA: CPT | Performed by: INTERNAL MEDICINE

## 2020-06-18 PROCEDURE — 85027 COMPLETE CBC AUTOMATED: CPT | Performed by: PHYSICIAN ASSISTANT

## 2020-06-18 PROCEDURE — 82803 BLOOD GASES ANY COMBINATION: CPT | Performed by: PHYSICIAN ASSISTANT

## 2020-06-18 PROCEDURE — 00000146 ZZHCL STATISTIC GLUCOSE BY METER IP

## 2020-06-18 PROCEDURE — 83605 ASSAY OF LACTIC ACID: CPT | Performed by: INTERNAL MEDICINE

## 2020-06-18 PROCEDURE — 36415 COLL VENOUS BLD VENIPUNCTURE: CPT | Performed by: INTERNAL MEDICINE

## 2020-06-18 RX ORDER — LIDOCAINE 40 MG/G
CREAM TOPICAL
Status: DISCONTINUED | OUTPATIENT
Start: 2020-06-18 | End: 2020-06-20 | Stop reason: HOSPADM

## 2020-06-18 RX ADMIN — POTASSIUM CHLORIDE, DEXTROSE MONOHYDRATE AND SODIUM CHLORIDE: 150; 5; 450 INJECTION, SOLUTION INTRAVENOUS at 10:18

## 2020-06-18 RX ADMIN — SODIUM CHLORIDE: 9 INJECTION, SOLUTION INTRAVENOUS at 13:34

## 2020-06-18 RX ADMIN — ENOXAPARIN SODIUM 40 MG: 40 INJECTION SUBCUTANEOUS at 22:07

## 2020-06-18 RX ADMIN — SODIUM CHLORIDE: 9 INJECTION, SOLUTION INTRAVENOUS at 16:46

## 2020-06-18 RX ADMIN — POTASSIUM CHLORIDE, DEXTROSE MONOHYDRATE AND SODIUM CHLORIDE: 150; 5; 450 INJECTION, SOLUTION INTRAVENOUS at 14:34

## 2020-06-18 RX ADMIN — POTASSIUM CHLORIDE AND SODIUM CHLORIDE: 450; 150 INJECTION, SOLUTION INTRAVENOUS at 03:24

## 2020-06-18 RX ADMIN — SODIUM CHLORIDE 14 UNITS/HR: 9 INJECTION, SOLUTION INTRAVENOUS at 22:45

## 2020-06-18 RX ADMIN — POTASSIUM CHLORIDE, DEXTROSE MONOHYDRATE AND SODIUM CHLORIDE: 150; 5; 450 INJECTION, SOLUTION INTRAVENOUS at 06:18

## 2020-06-18 RX ADMIN — SODIUM CHLORIDE 8 UNITS/HR: 9 INJECTION, SOLUTION INTRAVENOUS at 06:35

## 2020-06-18 RX ADMIN — POTASSIUM CHLORIDE, DEXTROSE MONOHYDRATE AND SODIUM CHLORIDE: 150; 5; 450 INJECTION, SOLUTION INTRAVENOUS at 18:45

## 2020-06-18 RX ADMIN — SODIUM CHLORIDE 6 UNITS/HR: 9 INJECTION, SOLUTION INTRAVENOUS at 01:31

## 2020-06-18 RX ADMIN — SODIUM CHLORIDE: 9 INJECTION, SOLUTION INTRAVENOUS at 10:10

## 2020-06-18 RX ADMIN — SODIUM CHLORIDE 14 UNITS/HR: 9 INJECTION, SOLUTION INTRAVENOUS at 19:27

## 2020-06-18 ASSESSMENT — ACTIVITIES OF DAILY LIVING (ADL)
ADLS_ACUITY_SCORE: 10

## 2020-06-18 ASSESSMENT — MIFFLIN-ST. JEOR: SCORE: 2269.63

## 2020-06-18 NOTE — PROGRESS NOTES
[place] [facility] [place]    Patient admitted to room 1004 at approximately 2220  via cart from emergency room. Patient was accompanied by nurse.     Verbal SBAR report received from Noe HEARN prior to patient arrival.     Patient ambulated to bed independently. Patient alert and oriented X 3. The patient is not having any pain. 0-10 Pain Scale: 7. Admission vital signs: Blood pressure (!) 161/102, pulse 131, temperature 98.6  F (37  C), temperature source Oral, resp. rate 21, height 1.702 m (5' 7\"), weight 129.6 kg (285 lb 11.5 oz), SpO2 98 %. Patient was oriented to plan of care, call light, bed controls, tv, telephone, bathroom and visiting hours.     Risk Assessment    The following safety risks were identified during admission: none. Yellow risk band applied: NO.     Skin Initial Assessment    This writer admitted this patient and completed a full skin assessment and Reg score in the Adult PCS flowsheet. Appropriate interventions initiated as needed.     Secondary skin check completed by Pt refused .         Education    Patient has a [place] to Observation order: No  Observation education completed and documented: No      Justa Bal RN    "

## 2020-06-18 NOTE — PROGRESS NOTES
Insulin titrated per algorithm throughout the day. BS have been higher after meal times but otherwise in the 200s, remains on algorithm 4. Dietary came by to discuss new diabetes diagnosis and dietary management. Pt resting in bed throughout the day. Call light within reach.

## 2020-06-18 NOTE — PROGRESS NOTES
VSS. Pt appears resting/sleeping well. Insulin drip presently at 12 units/hr, Algorithm 4. Last accucheck 247.

## 2020-06-18 NOTE — PROGRESS NOTES
Ketones still critical at 4.7, but improving. Pt states he is feeling much better now after fluids and insulin administration.

## 2020-06-18 NOTE — PROGRESS NOTES
"CLINICAL NUTRITION SERVICES  -  ASSESSMENT NOTE      RECOMMENDATIONS FOR MD/PROVIDER TO ORDER:   None   Recommendations Ordered by Registered Dietitian (RD):   Food preferences given to kitchen   Future/Additional Recommendations:  Mod CHO diet as ordered  Continue to monitor and encourage oral intake  Recommend pt f/up with CDE after discharge to help manage new diabetes dx   Malnutrition: Unable to determine due to reported wt loss cannot be verified in EMR.        REASON FOR ASSESSMENT  Mynor Rollins is a 19 year old male seen by Registered Dietitian for Admission Nutrition Risk Screen for new/uncontrolled diabetes      NUTRITION HISTORY  Information obtained from EMR:  -Presented w/ 3 days of increased thirst and urination along with some nausea and vomiting. Found to have new dx of diabetes (unclear type) - in DKA with A1C 11.3 and .  -also presented with chest pain. Chest CT unremarkable and no evidence of infection on review of symptoms or exam.  -morbidly obese. Does not have a PCP.    Information obtained from pt (face to face):  -pt eats 3 meals/day:   Breakfast: eggs, le, maybe toast (2 slices, white, with butter), and 1 cup orange juice   Lunch and dinner: meat (beef), white rice, and water  -estimates drinking 12 cups of water per day; likes black or green tea with honey, and almond milk  -eats broccoli, spinach, asparagus, cabbage, kale, and tofu  -has dessert once in awhile if it's in the house  -states he's \"half lactose intolerant\" - gets gassy  -pt has been eating less (a little less at each meal compared to normal) for the last 2 weeks, likely r/t new onset of diabetes      CURRENT NUTRITION ORDERS  Diet Order:     Orders Placed This Encounter      Moderate Consistent CHO Diet      Current Intake/Tolerance:  -75% of 2 meals today  -states his appetite is alright now      NUTRITION FOCUSED PHYSICAL ASSESSMENT FOR DIAGNOSING MALNUTRITION)  Yes - visual            Obtained from " "Chart/Interdisciplinary Team:  None    ANTHROPOMETRICS  Height: 5' 7\"  Weight: 285 lbs 11.46 oz  Body mass index is 44.75 kg/m .  Weight Status:  Obesity Grade III BMI >40  IBW: 148 lbs / 67 kg  % IBW: 193%  Weight History:   Wt Readings from Last 10 Encounters:   06/18/20 129.6 kg (285 lb 11.5 oz) (>99 %, Z= 2.90)*     * Growth percentiles are based on CDC (Boys, 2-20 Years) data.   -not enough wt hx on file to determine if pt has lost wt and/or if it meets criteria for malnutrition.  -pt states he's been losing wt - he's lost 15 lbs in the last 2 weeks. UBW is 315-318 lbs.      Vitals:    06/17/20 1645 06/17/20 2230 06/18/20 0500   Weight: 137.9 kg (304 lb) 129.6 kg (285 lb 11.5 oz) 129.6 kg (285 lb 11.5 oz)   -suspect initial admit wt was reported by pt, given large difference between 304 lbs and 285 lbs.       LABS  BG last 6: 298, 305, 283, 263, 269, 228  Ketone 2.8 (very high) - trending down        MEDICATIONS  IVF: dextrose 5% + NaCl + KCl @ 250 mL/hr  Insulin drip per protocol      ASSESSED NUTRITION NEEDS PER APPROVED PRACTICE GUIDELINES:    Dosing Weight 83 kg (adjusted wt)  Estimated Energy Needs: 5153-1387 kcals (20-25 Kcal/Kg)  Justification: obese  Estimated Protein Needs:  grams protein (1-1.2 g pro/Kg)  Justification: obesity guidelines   Estimated Fluid Needs: (1 mL/Kcal)  Justification: maintenance    MALNUTRITION:  % Weight Loss: Unable to determine - wt loss is reported and cannot be confirmed in EMR  % Intake:  <75% for > 7 days (non-severe malnutrition)  Subcutaneous Fat Loss:  None observed  Muscle Loss:  None observed  Fluid Retention:  None noted    Malnutrition Diagnosis: Unable to determine due to reported wt loss cannot be verified in EMR.    NUTRITION DIAGNOSIS:  Altered nutrition-related lab value (BG, A1C) related to diet high in simple carbohydrates and excessive in calories likely contributing to new diagnosis of diabetes as evidenced by A1C of 11.3 and .      NUTRITION " INTERVENTIONS  Recommendations / Nutrition Prescription  Mod CHO diet as ordered  Food preferences given to kitchen  Continue to monitor and encourage oral intake  Recommend pt f/up with CDE after discharge to help manage new diabetes dx      Implementation  Nutrition education: Provided education on the following handouts: Understanding Diabetes, Using Nutrition Labels: Carbohydrates, and healthy snacks (complex carbohydrate + protein). Encouraged consistent carbohydrate intake.  Composition of Meals and Snacks, General/healthful diet: see above  Collaboration and Referral of Nutrition care: Pt POC discussed during IDT rounds this morning.      Nutrition Goals  BG < 300      MONITORING AND EVALUATION:  Progress towards goals will be monitored and evaluated per protocol and Practice Guidelines              Jacey Kee RDN, LD  Clinical Dietitian  [phone]

## 2020-06-18 NOTE — PROGRESS NOTES
[facility] Internal Medicine Progress Note     Date of Service (when I saw the patient): 06/18/2020    REASON FOR ADMISSION / INTERVAL HISTORY:  Mynor Rollins is a 19 year old male admitted on 6/17/2020. He presented to the emergency department for evaluation of a 3 day history of chest pain, shortness of breath, polydipsia, and polyuria. Has DKA and is being rx as below. Feels better.    ASSESSMENT/PLAN:     DKA (diabetic ketoacidoses)  Newly diagnosed diabetes - unclear type  No known prior history of diabetes mellitus, but family history of type 2 (father, paternal grandmother). A1c 11.3. Presented with anion gap 24, metabolic acidosis on VBG (pH 7.09 / pCO2 24 / bicarb 7), ketones 4.6, lactic acid 2.8. Glucose 596. Was given 1L NS in the emergency department and started on insulin drip. Admitted  to ICU on insulin drip. RITESH antibody pending  Continues to be in severe DKA-improving slowly. Bicarb still 13 -AG improved to 11 from 21. Tachycardia improved.  -continue insulin gtt per DKA protocol until bicarb(co2) > 19-20. If blood sugars low but pt still acidotic, would add d10 and continue insulin gtt. Will f/u labs at 6PM and then AM.     Elevated d-dimer  Admit d-dimer 0.8. CT PE was negative for pulmonary embolism. No obvious evidence of leg swelling to indicate DVT.     Chest pain - pleuritic   Sharp pain with inspiration. Chest CT was negative for PE, no other obvious cause for pain. EKG without ST changes, troponin negative. Likely due to DKA.  Resolved.      Leukocytosis   Admit WBC 17.4. Afebrile. Chest CT unremarkable. No evidence of infection on review of systems or exam. Possibly stress reaction related to DKA.  Improving.      Morbid obesity  Admit BMI 47.61. Contributes to morbidity and mortality.    DISPO  Will be in hospital atleast 1-2 days.         TAYLOR COWART MD   Pg [phone]    DVT Prhylaxis: Low Risk/Ambulatory with no VTE prophylaxis indicated  Code Status: Full Code    ROS:  As  "described in A/P and Exam.  Otherwise ALL are  negative.    PHYSICAL EXAM:  All vitals have been reviewed    Blood pressure (!) 156/88, pulse 106, temperature 98.6  F (37  C), temperature source Oral, resp. rate 26, height 1.702 m (5' 7\"), weight 129.6 kg (285 lb 11.5 oz), SpO2 99 %.    I/O this shift:  In: 1995.66 [P.O.:240; I.V.:1755.66]  Out: -     GENERAL APPEARANCE: obese, alert and no distress  EYES: conjunctiva clear, eyes grossly normal  HENT: external ears and nose normal   NECK: supple, no masses or adenopathy  RESP: lungs clear to auscultation - no rales, rhonchi or wheezes  CV: regular rate and rhythm, normal S1 S2, no S3 or S4 and no murmur, click or rub   ABDOMEN: soft, nontender, no HSM or masses and bowel sounds normal  MS: no clubbing, cyanosis; no edema  SKIN: clear without significant rashes or lesions  NEURO: -non-focal moves all 4 extr    ROUTINE  LABS (Last four results)  CMP  Recent Labs   Lab 06/18/20  1153 06/18/20  1013 06/18/20  0815 06/18/20  0610  06/17/20  2315 06/17/20  1704   * 130* 134 136   < > 135 134   POTASSIUM 3.4 3.6 3.9 3.6   < > 3.9 4.7   CHLORIDE 106 106 108 110   < > 106 101   CO2 13* 13* 11* 10*   < > 8* 9*   ANIONGAP 11 11 15* 16*   < > 21* 24*   * 305* 270* 190*   < > 324* 574*   BUN 8 9 8 9   < > 10 12   CR 0.53 0.61 0.54 0.61   < > 0.72 0.86   GFRESTIMATED >90 >90 >90 >90   < > >90 >90   GFRESTBLACK >90 >90 >90 >90   < > >90 >90   CAROL 8.5 8.6 8.3* 8.2*   < > 8.5 9.4   MAG  --   --   --   --   --  2.3 2.7*   PHOS  --   --   --  1.4*  --  1.5* 2.9   PROTTOTAL  --   --   --   --   --  8.8  --    ALBUMIN  --   --   --   --   --  4.7  --    BILITOTAL  --   --   --   --   --  0.7  --    ALKPHOS  --   --   --   --   --  145  --    AST  --   --   --   --   --  24  --    ALT  --   --   --   --   --  96*  --     < > = values in this interval not displayed.     CBC  Recent Labs   Lab 06/18/20  0610 06/17/20  2315 06/17/20  1704   WBC 13.4*  --  17.4*   RBC 5.56  --  " 6.36*   HGB 15.9  --  18.2*   HCT 47.7  --  55.4*   MCV 86  --  87   MCH 28.6  --  28.6   MCHC 33.3  --  32.9   RDW 12.9  --  13.1    360 436     INRNo lab results found in last 7 days.  Arterial Blood Gas  Recent Labs   Lab 06/18/20  0207 06/17/20  2257 06/17/20  1704   O2PER 21 21 HIDE       Recent Results (from the past 24 hour(s))   CT Chest Pulmonary Embolism w Contrast    Narrative    EXAM: CT CHEST PULMONARY EMBOLISM W CONTRAST  LOCATION: [facility]  DATE/TIME: 6/17/2020 7:37 PM    INDICATION: Chest pain, shortness of breath, DKA  COMPARISON: None.  TECHNIQUE: CT chest pulmonary angiogram during arterial phase injection of IV contrast. Multiplanar reformats and MIP reconstructions were performed. Dose reduction techniques were used.   CONTRAST: 96 ml Isovue 370    FINDINGS:  ANGIOGRAM CHEST: Pulmonary arteries are normal caliber and negative for pulmonary emboli. Thoracic aorta is negative for dissection. No CT evidence of right heart strain.    LUNGS AND PLEURA: Evidence for remote granulomatous disease. No infiltrate or effusion.    MEDIASTINUM/AXILLAE: Normal.    UPPER ABDOMEN: Dense hepatic steatosis.    MUSCULOSKELETAL: Normal.      Impression    IMPRESSION:  1.  Dense hepatic steatosis.  2.  No pulmonary embolus, aortic aneurysm or dissection.

## 2020-06-18 NOTE — PROGRESS NOTES
Pt mother called to check on pt, updated her after approval obtained from pt. Discussed concern for information overload with new diagnosis, mother agreed and stated that she and her  would be a good resource for pt since they are familiar with the diagnosis as it runs in their family.  Pt denies any pain or questions/concerns. Call light within reach.

## 2020-06-18 NOTE — H&P
[facility]    History and Physical - Hospitalist Service       Date of Admission:  6/17/2020    Assessment & Plan   Mynor Rollins is a 19 year old male admitted on 6/17/2020. He presented to the emergency department for evaluation of a 3 day history of chest pain, shortness of breath, polydipsia, and polyuria and was found to be in DKA for which he is being admitted for further treatment and evaluation.    DKA (diabetic ketoacidoses)  Newly diagnosed diabetes - unclear type  No known prior history of diabetes mellitus, but family history of type 2 (father, paternal grandmother). A1c 11.3. Presented with anion gap 24, metabolic acidosis on VBG (pH 7.09 / pCO2 24 / bicarb 7), ketones 4.6, lactic acid 2.8. Glucose 596. Was given 1L NS in the emergency department and started on insulin drip.  - Admit to ICU on insulin drip  - Repeat BMP, VBG, and ketones q 2 hours until normalized  - IV fluids per DKA order set  - RITESH antibody pending  - Patient does not have an established PCP; will need to establish care at discharge    Elevated d-dimer  Admit d-dimer 0.8. CT PE was negative for pulmonary embolism. No obvious evidence of leg swelling to indicate DVT.  - No further work-up    Chest pain - pleuritic   Sharp pain with inspiration. Chest CT was negative for PE, no other obvious cause for pain. EKG without ST changes, troponin negative. Unclear cause of chest pain, but it is improving with DKA treatment.  - Prn dilaudid available    Leukocytosis   Admit WBC 17.4. Afebrile. Chest CT unremarkable. No evidence of infection on review of systems or exam. Possibly stress reaction related to DKA.  - UA pending  - No antibiotics indicated at this time  - CBC in am    Morbid obesity  Admit BMI 47.61. Contributes to morbidity and mortality.       Diet: Advance Diet as Tolerated: Clear Liquid Diet    DVT Prophylaxis: Enoxaparin (Lovenox) SQ  Damico Catheter: not present  Code Status: Full Code  -  "discussed with patient on admission         Disposition Plan   Expected discharge: 2 days, recommended to prior living arrangement once DKA treated, acidosis and ketosis resolves, outpatient glucose regimen in place.  Entered: Lyla Gonsalez PA-C 06/17/2020, 11:26 PM     The patient's care was discussed with the Attending Physician, Dr. Nick Aleman and Patient.    Lyla Gonsalez PA-C  [facility]    ______________________________________________________________________    Chief Complaint   \"I'm thirsty all the time and peeing a lot.\"    History is obtained from the patient and emergency department sign out from Dr. Chandan Chapman.    History of Present Illness   Mynor Rollins is a 19 year old male who presented to the emergency department for evaluation of increased thirst, urination, and chest pain.    Patient has no prior medical diagnoses and is not on any chronic medications. No diagnosed history of diabetes mellitus.    Three days ago he noticed increased thirst and has been drinking a lot of water (denies any significant pop intake). He has been urinating frequently but no dysuria. He has frequent headaches and feels increasingly fatigued. He had some nausea and vomited 3-4 times in the past few days. No abdominal pain. No diarrhea or constipation.    He also developed pleuritic chest pain 3 days ago. Pain has been fairly constant located \"at the bottom of my lungs,\" and is present when he breathes in. He describes it as a sharp pain rated 7/10, no radiation. Pain has started to improve after DKA initiation in the emergency department. He feels short of breath, but no cough or wheeze. He has felt palpitations and a racing heart lately.    He denies any fevers or chills, no recent illness. No vision changes. No skin changes. Denies pain other than chest pain. No dizziness, falls, or syncope. No confusion. The remainder review of systems is negative.    Review of Systems "    The 10 point Review of Systems is negative other than noted in the HPI or here.     Past Medical History    I have reviewed this patient's medical history and updated it with pertinent information if needed.   History reviewed. No pertinent past medical history.    Past Surgical History   I have reviewed this patient's surgical history and updated it with pertinent information if needed.  Past Surgical History:   Procedure Laterality Date     NO HISTORY OF SURGERY         Social History   I have reviewed this patient's social history and updated it with pertinent information if needed.  Social History     Tobacco Use     Smoking status: Never Smoker     Smokeless tobacco: Never Used   Substance Use Topics     Alcohol use: Yes     Frequency: 2-4 times a month     Comment: Occasionally     Drug use: Never       Family History   I have reviewed this patient's family history and updated it with pertinent information if needed.   Family History   Problem Relation Age of Onset     Diabetes Type 2  Father      Diabetes Paternal Grandmother        Prior to Admission Medications   None     Allergies   No Known Allergies    Physical Exam   Vital Signs: Temp: 98.6  F (37  C) Temp src: Oral BP: (!) 161/102 Pulse: 131 Heart Rate: 129 Resp: 21 SpO2: 98 % O2 Device: None (Room air)    Weight: 285 lbs 11.46 oz    Constitutional: Alert, oriented, cooperative, no apparent distress. Ill appearing but  nontoxic, speaking in full sentences.     Eyes: Eyes are clear, pupils are reactive. No scleral icterus.     HEENT: Oropharynx is clear but dry, no lesions. Normocephalic, no evidence of cranial trauma.      Cardiovascular: Regular rhythm rapid rate, normal S1 and S2. No murmur, rubs, or gallops. Peripheral pulses intact bilaterally. No lower extremity edema.    Respiratory: Lung sounds are clear to auscultation bilaterally without wheezes, rhonchi, or crackles.    GI: Soft, non-distended. Non-tender, no rebound or guarding. No  hepatosplenomegaly or masses appreciated. Normal bowel sounds.     Musculoskeletal: Without obvious deformity, normal range of motion. Distal CMS intact.      Skin: Warm and dry, no rashes or ecchymoses. No mottling of skin.      Neurologic: Patient moves all extremities.  is symmetric. Gross strength and sensation are equal bilaterally.    Genitourinary: Deferred      Data   Data reviewed today: I reviewed all medications, new labs and imaging results over the last 24 hours. I personally reviewed the chest CT image(s) showing hepatomegaly, clear lungs.    Recent Labs   Lab 06/17/20  2315 06/17/20  1704   WBC  --  17.4*   HGB  --  18.2*   MCV  --  87    436    134   POTASSIUM 3.9 4.7   CHLORIDE 106 101   CO2 PENDING 9*   BUN PENDING 12   CR PENDING 0.86   ANIONGAP PENDING 24*   CAROL PENDING 9.4   GLC PENDING 574*   TROPI  --  <0.015     Recent Results (from the past 24 hour(s))   CT Chest Pulmonary Embolism w Contrast    Narrative    EXAM: CT CHEST PULMONARY EMBOLISM W CONTRAST  LOCATION: [facility]  DATE/TIME: 6/17/2020 7:37 PM    INDICATION: Chest pain, shortness of breath, DKA  COMPARISON: None.  TECHNIQUE: CT chest pulmonary angiogram during arterial phase injection of IV contrast. Multiplanar reformats and MIP reconstructions were performed. Dose reduction techniques were used.   CONTRAST: 96 ml Isovue 370    FINDINGS:  ANGIOGRAM CHEST: Pulmonary arteries are normal caliber and negative for pulmonary emboli. Thoracic aorta is negative for dissection. No CT evidence of right heart strain.    LUNGS AND PLEURA: Evidence for remote granulomatous disease. No infiltrate or effusion.    MEDIASTINUM/AXILLAE: Normal.    UPPER ABDOMEN: Dense hepatic steatosis.    MUSCULOSKELETAL: Normal.      Impression    IMPRESSION:  1.  Dense hepatic steatosis.  2.  No pulmonary embolus, aortic aneurysm or dissection.

## 2020-06-18 NOTE — PROGRESS NOTES
Accucheck 184, insulin drip changed to 8 units/hr, Algorithm 4.  IV changed to D5 1/2 NS with 20 KCL at 250 ml/hr.

## 2020-06-19 ENCOUNTER — DOCUMENTATION ONLY (OUTPATIENT)
Dept: OTHER | Facility: CLINIC | Age: 20
End: 2020-06-19

## 2020-06-19 LAB
ANION GAP SERPL CALCULATED.3IONS-SCNC: 10 MMOL/L (ref 3–14)
ANION GAP SERPL CALCULATED.3IONS-SCNC: 13 MMOL/L (ref 3–14)
BUN SERPL-MCNC: 7 MG/DL (ref 7–30)
BUN SERPL-MCNC: 7 MG/DL (ref 7–30)
CALCIUM SERPL-MCNC: 8.5 MG/DL (ref 8.5–10.1)
CALCIUM SERPL-MCNC: 8.7 MG/DL (ref 8.5–10.1)
CHLORIDE SERPL-SCNC: 107 MMOL/L (ref 98–110)
CHLORIDE SERPL-SCNC: 107 MMOL/L (ref 98–110)
CO2 SERPL-SCNC: 19 MMOL/L (ref 20–32)
CO2 SERPL-SCNC: 20 MMOL/L (ref 20–32)
CREAT SERPL-MCNC: 0.6 MG/DL (ref 0.5–1)
CREAT SERPL-MCNC: 0.63 MG/DL (ref 0.5–1)
GAD65 AB SER IA-ACNC: <5 IU/ML (ref 0–5)
GFR SERPL CREATININE-BSD FRML MDRD: >90 ML/MIN/{1.73_M2}
GFR SERPL CREATININE-BSD FRML MDRD: >90 ML/MIN/{1.73_M2}
GLUCOSE BLDC GLUCOMTR-MCNC: 174 MG/DL (ref 70–99)
GLUCOSE BLDC GLUCOMTR-MCNC: 186 MG/DL (ref 70–99)
GLUCOSE BLDC GLUCOMTR-MCNC: 190 MG/DL (ref 70–99)
GLUCOSE BLDC GLUCOMTR-MCNC: 193 MG/DL (ref 70–99)
GLUCOSE BLDC GLUCOMTR-MCNC: 197 MG/DL (ref 70–99)
GLUCOSE BLDC GLUCOMTR-MCNC: 220 MG/DL (ref 70–99)
GLUCOSE BLDC GLUCOMTR-MCNC: 225 MG/DL (ref 70–99)
GLUCOSE BLDC GLUCOMTR-MCNC: 240 MG/DL (ref 70–99)
GLUCOSE BLDC GLUCOMTR-MCNC: 242 MG/DL (ref 70–99)
GLUCOSE BLDC GLUCOMTR-MCNC: 253 MG/DL (ref 70–99)
GLUCOSE BLDC GLUCOMTR-MCNC: 306 MG/DL (ref 70–99)
GLUCOSE BLDC GLUCOMTR-MCNC: 371 MG/DL (ref 70–99)
GLUCOSE BLDC GLUCOMTR-MCNC: 421 MG/DL (ref 70–99)
GLUCOSE SERPL-MCNC: 215 MG/DL (ref 70–99)
GLUCOSE SERPL-MCNC: 290 MG/DL (ref 70–99)
LACTATE BLD-SCNC: 1.2 MMOL/L (ref 0.7–2)
POTASSIUM SERPL-SCNC: 3.1 MMOL/L (ref 3.4–5.3)
POTASSIUM SERPL-SCNC: 3.4 MMOL/L (ref 3.4–5.3)
SODIUM SERPL-SCNC: 137 MMOL/L (ref 133–144)
SODIUM SERPL-SCNC: 139 MMOL/L (ref 133–144)

## 2020-06-19 PROCEDURE — 25800030 ZZH RX IP 258 OP 636: Performed by: INTERNAL MEDICINE

## 2020-06-19 PROCEDURE — 25000131 ZZH RX MED GY IP 250 OP 636 PS 637: Performed by: PHYSICIAN ASSISTANT

## 2020-06-19 PROCEDURE — 25000132 ZZH RX MED GY IP 250 OP 250 PS 637: Performed by: INTERNAL MEDICINE

## 2020-06-19 PROCEDURE — 83605 ASSAY OF LACTIC ACID: CPT | Performed by: INTERNAL MEDICINE

## 2020-06-19 PROCEDURE — 25800030 ZZH RX IP 258 OP 636: Performed by: PHYSICIAN ASSISTANT

## 2020-06-19 PROCEDURE — 36415 COLL VENOUS BLD VENIPUNCTURE: CPT | Performed by: INTERNAL MEDICINE

## 2020-06-19 PROCEDURE — 25800030 ZZH RX IP 258 OP 636: Performed by: FAMILY MEDICINE

## 2020-06-19 PROCEDURE — 25000128 H RX IP 250 OP 636: Performed by: INTERNAL MEDICINE

## 2020-06-19 PROCEDURE — 99232 SBSQ HOSP IP/OBS MODERATE 35: CPT | Performed by: INTERNAL MEDICINE

## 2020-06-19 PROCEDURE — 25000131 ZZH RX MED GY IP 250 OP 636 PS 637: Performed by: INTERNAL MEDICINE

## 2020-06-19 PROCEDURE — 12000011 ZZH R&B MS OVERFLOW

## 2020-06-19 PROCEDURE — 00000146 ZZHCL STATISTIC GLUCOSE BY METER IP

## 2020-06-19 PROCEDURE — 80048 BASIC METABOLIC PNL TOTAL CA: CPT | Performed by: INTERNAL MEDICINE

## 2020-06-19 RX ORDER — DEXTROSE MONOHYDRATE 25 G/50ML
25-50 INJECTION, SOLUTION INTRAVENOUS
Status: DISCONTINUED | OUTPATIENT
Start: 2020-06-19 | End: 2020-06-20 | Stop reason: HOSPADM

## 2020-06-19 RX ORDER — SODIUM CHLORIDE 9 MG/ML
INJECTION, SOLUTION INTRAVENOUS CONTINUOUS
Status: DISCONTINUED | OUTPATIENT
Start: 2020-06-19 | End: 2020-06-20

## 2020-06-19 RX ORDER — POTASSIUM CHLORIDE 1500 MG/1
20-40 TABLET, EXTENDED RELEASE ORAL
Status: DISCONTINUED | OUTPATIENT
Start: 2020-06-19 | End: 2020-06-20 | Stop reason: HOSPADM

## 2020-06-19 RX ORDER — NICOTINE POLACRILEX 4 MG
15-30 LOZENGE BUCCAL
Status: DISCONTINUED | OUTPATIENT
Start: 2020-06-19 | End: 2020-06-20 | Stop reason: HOSPADM

## 2020-06-19 RX ORDER — POTASSIUM CL/LIDO/0.9 % NACL 10MEQ/0.1L
10 INTRAVENOUS SOLUTION, PIGGYBACK (ML) INTRAVENOUS DAILY PRN
Status: DISCONTINUED | OUTPATIENT
Start: 2020-06-19 | End: 2020-06-20 | Stop reason: HOSPADM

## 2020-06-19 RX ORDER — POTASSIUM CHLORIDE 7.45 MG/ML
10 INJECTION INTRAVENOUS
Status: DISCONTINUED | OUTPATIENT
Start: 2020-06-19 | End: 2020-06-20 | Stop reason: HOSPADM

## 2020-06-19 RX ORDER — POTASSIUM CHLORIDE 29.8 MG/ML
20 INJECTION INTRAVENOUS
Status: DISCONTINUED | OUTPATIENT
Start: 2020-06-19 | End: 2020-06-19 | Stop reason: CLARIF

## 2020-06-19 RX ORDER — POTASSIUM CHLORIDE 1.5 G/1.58G
20-40 POWDER, FOR SOLUTION ORAL
Status: DISCONTINUED | OUTPATIENT
Start: 2020-06-19 | End: 2020-06-20 | Stop reason: HOSPADM

## 2020-06-19 RX ORDER — MAGNESIUM SULFATE HEPTAHYDRATE 40 MG/ML
4 INJECTION, SOLUTION INTRAVENOUS EVERY 4 HOURS PRN
Status: DISCONTINUED | OUTPATIENT
Start: 2020-06-19 | End: 2020-06-20 | Stop reason: HOSPADM

## 2020-06-19 RX ADMIN — SODIUM CHLORIDE 14 UNITS/HR: 9 INJECTION, SOLUTION INTRAVENOUS at 09:59

## 2020-06-19 RX ADMIN — SODIUM CHLORIDE 8 UNITS/HR: 9 INJECTION, SOLUTION INTRAVENOUS at 04:41

## 2020-06-19 RX ADMIN — POTASSIUM CHLORIDE 20 MEQ: 20 TABLET, EXTENDED RELEASE ORAL at 09:55

## 2020-06-19 RX ADMIN — INSULIN GLARGINE 100 UNITS: 100 INJECTION, SOLUTION SUBCUTANEOUS at 10:59

## 2020-06-19 RX ADMIN — SODIUM CHLORIDE: 9 INJECTION, SOLUTION INTRAVENOUS at 19:31

## 2020-06-19 RX ADMIN — POTASSIUM CHLORIDE 40 MEQ: 20 TABLET, EXTENDED RELEASE ORAL at 08:04

## 2020-06-19 RX ADMIN — ENOXAPARIN SODIUM 40 MG: 40 INJECTION SUBCUTANEOUS at 22:10

## 2020-06-19 RX ADMIN — INSULIN ASPART 4 UNITS: 100 INJECTION, SOLUTION INTRAVENOUS; SUBCUTANEOUS at 12:10

## 2020-06-19 RX ADMIN — INSULIN ASPART 7 UNITS: 100 INJECTION, SOLUTION INTRAVENOUS; SUBCUTANEOUS at 16:43

## 2020-06-19 RX ADMIN — POTASSIUM CHLORIDE, DEXTROSE MONOHYDRATE AND SODIUM CHLORIDE: 150; 5; 450 INJECTION, SOLUTION INTRAVENOUS at 04:58

## 2020-06-19 RX ADMIN — POTASSIUM CHLORIDE, DEXTROSE MONOHYDRATE AND SODIUM CHLORIDE: 150; 5; 450 INJECTION, SOLUTION INTRAVENOUS at 18:59

## 2020-06-19 ASSESSMENT — ACTIVITIES OF DAILY LIVING (ADL)
ADLS_ACUITY_SCORE: 10

## 2020-06-19 ASSESSMENT — MIFFLIN-ST. JEOR: SCORE: 2326.63

## 2020-06-19 NOTE — PROGRESS NOTES
Spoke to pt's father, with pt's permission, answered questions. Father stated can assist pt at home with diabetes management. Father states is diabetic also. Beatrice Martínez RN BSN

## 2020-06-19 NOTE — PROGRESS NOTES
New orders received for addition of Ketones blood draw and awaiting results.  Last two blood sugars were 268 & 261 at 1900 & 2000.  Decreasing insulin drip to 14 units and remained at that level for last blood sugar also will continue to monitor.

## 2020-06-19 NOTE — PROGRESS NOTES
BS noted to still be high at 1800 lab draw, MD paged with update wondering if D5 can be titrated down as pt is already on algorithm 4.

## 2020-06-19 NOTE — PROGRESS NOTES
[facility] Internal Medicine Progress Note     Date of Service (when I saw the patient): 06/19/2020    REASON FOR ADMISSION / INTERVAL HISTORY:  Mynor Rollins is a 19 year old male admitted on 6/17/2020. He presented to the emergency department for evaluation of a 3 day history of chest pain, shortness of breath, polydipsia, and polyuria. Has DKA and is being rx as below. Feels better.    ASSESSMENT/PLAN:     DKA (diabetic ketoacidoses)  Newly diagnosed diabetes - unclear type  No known prior history of diabetes mellitus, but family history of type 2 (father, paternal grandmother). A1c 11.3. Presented with anion gap 24, metabolic acidosis on VBG (pH 7.09 / pCO2 24 / bicarb 7), ketones 4.6, lactic acid 2.8. Glucose 596. Was given 1L NS in the emergency department and started on insulin drip. Admitted  to ICU on insulin drip. RTIESH antibody pending  Continued to be in severe DKA- until this morning--improving slowly. Bicarb now 19 -AG improved to 13 from 21. Tachycardia improved.  Calculated TDD of insulin last 24 hrs is 300units. Will start with 100units lantus dalily and 25units premeal insulin tid, ISS high dose. Would monitor in hospital. May need further dose adjustment prior to discharge and OP f/u.      Elevated d-dimer  Admit d-dimer 0.8. CT PE was negative for pulmonary embolism. No obvious evidence of leg swelling to indicate DVT.     Chest pain - pleuritic   Sharp pain with inspiration. Chest CT was negative for PE, no other obvious cause for pain. EKG without ST changes, troponin negative. Likely due to DKA.  Resolved.      Leukocytosis   Admit WBC 17.4. Afebrile. Chest CT unremarkable. No evidence of infection on review of systems or exam. Possibly stress reaction related to DKA.  Improving.      Morbid obesity  Admit BMI 47.61. Contributes to morbidity and mortality.    DISPO  Will be in hospital atleast one more day. Likely home in AM.         TAYLOR COWART MD   Pg [phone]    DVT Prhylaxis:  "Low Risk/Ambulatory with no VTE prophylaxis indicated  Code Status: Full Code    ROS:  As described in A/P and Exam.  Otherwise ALL are  negative.    PHYSICAL EXAM:  All vitals have been reviewed    Blood pressure 129/75, pulse 96, temperature 97.7  F (36.5  C), temperature source Oral, resp. rate 16, height 1.702 m (5' 7\"), weight 135.3 kg (298 lb 4.5 oz), SpO2 98 %.    I/O this shift:  In: 513.01 [P.O.:200; I.V.:313.01]  Out: -     GENERAL APPEARANCE: obese, alert and no distress  EYES: conjunctiva clear, eyes grossly normal  HENT: external ears and nose normal   NECK: supple, no masses or adenopathy  RESP: lungs clear to auscultation - no rales, rhonchi or wheezes  CV: regular rate and rhythm, normal S1 S2, no S3 or S4 and no murmur, click or rub   ABDOMEN: soft, nontender, no HSM or masses and bowel sounds normal  MS: no clubbing, cyanosis; no edema  SKIN: clear without significant rashes or lesions  NEURO: -non-focal moves all 4 extr    ROUTINE  LABS (Last four results)  CMP  Recent Labs   Lab 06/19/20  0550 06/18/20  1809 06/18/20  1153 06/18/20  1013  06/18/20  0610  06/17/20  2315 06/17/20  1704    134 130* 130*   < > 136   < > 135 134   POTASSIUM 3.1* 3.8 3.4 3.6   < > 3.6   < > 3.9 4.7   CHLORIDE 107 108 106 106   < > 110   < > 106 101   CO2 19* 15* 13* 13*   < > 10*   < > 8* 9*   ANIONGAP 13 11 11 11   < > 16*   < > 21* 24*   * 324* 269* 305*   < > 190*   < > 324* 574*   BUN 7 7 8 9   < > 9   < > 10 12   CR 0.60 0.57 0.53 0.61   < > 0.61   < > 0.72 0.86   GFRESTIMATED >90 >90 >90 >90   < > >90   < > >90 >90   GFRESTBLACK >90 >90 >90 >90   < > >90   < > >90 >90   CAROL 8.7 8.2* 8.5 8.6   < > 8.2*   < > 8.5 9.4   MAG  --   --   --   --   --   --   --  2.3 2.7*   PHOS  --   --   --   --   --  1.4*  --  1.5* 2.9   PROTTOTAL  --   --   --   --   --   --   --  8.8  --    ALBUMIN  --   --   --   --   --   --   --  4.7  --    BILITOTAL  --   --   --   --   --   --   --  0.7  --    ALKPHOS  --   --   -- "   --   --   --   --  145  --    AST  --   --   --   --   --   --   --  24  --    ALT  --   --   --   --   --   --   --  96*  --     < > = values in this interval not displayed.     CBC  Recent Labs   Lab 06/18/20  0610 06/17/20  2315 06/17/20  1704   WBC 13.4*  --  17.4*   RBC 5.56  --  6.36*   HGB 15.9  --  18.2*   HCT 47.7  --  55.4*   MCV 86  --  87   MCH 28.6  --  28.6   MCHC 33.3  --  32.9   RDW 12.9  --  13.1    360 436     INRNo lab results found in last 7 days.  Arterial Blood Gas  Recent Labs   Lab 06/18/20  0207 06/17/20  2257 06/17/20  1704   O2PER 21 21 HIDE       No results found for this or any previous visit (from the past 24 hour(s)).

## 2020-06-19 NOTE — PLAN OF CARE
Slow improvement of blood sugars thru the night, did have snack this am so 0600 blood sugar was slightly increased and insulin drip increased to 10 units at that time, will be from now on doing every 2 hour readings.  Vital signs stable , did sleep a lot during day so awake since 0500.  Plan of care continues.

## 2020-06-19 NOTE — PLAN OF CARE
Since decrease in fluid amount per hour to 75 ml/ hour, increase in BS at 2200 but since than decreasing.  Last blood sugar check was 186 and insulin drip decreased to 8 units/ hour.  Allowing 90 minutes between next blood sugar to allow for better rest as tonight will be second entire night being awakened frequently thru the night for checks so allowing slightly more time between such for resting.  Continue to monitor.

## 2020-06-19 NOTE — PROGRESS NOTES
Blood sugar this last hour 242 and reduced insulin drip to 12 units with this last accucheck testing.  Ketones reported back at 0.8.  IV solution left at 5% Dextrose with 1/2 Normal Saline with 20 Meq of Kcl but rate reduced to 75 ml/ hour.  Will continue to monitor closely.

## 2020-06-20 VITALS
HEIGHT: 67 IN | SYSTOLIC BLOOD PRESSURE: 126 MMHG | WEIGHT: 296.3 LBS | RESPIRATION RATE: 18 BRPM | HEART RATE: 94 BPM | DIASTOLIC BLOOD PRESSURE: 81 MMHG | OXYGEN SATURATION: 96 % | BODY MASS INDEX: 46.51 KG/M2 | TEMPERATURE: 98.4 F

## 2020-06-20 LAB
ANION GAP SERPL CALCULATED.3IONS-SCNC: 11 MMOL/L (ref 3–14)
BUN SERPL-MCNC: 9 MG/DL (ref 7–30)
CALCIUM SERPL-MCNC: 8.5 MG/DL (ref 8.5–10.1)
CHLORIDE SERPL-SCNC: 106 MMOL/L (ref 98–110)
CO2 SERPL-SCNC: 21 MMOL/L (ref 20–32)
CREAT SERPL-MCNC: 0.57 MG/DL (ref 0.5–1)
GFR SERPL CREATININE-BSD FRML MDRD: >90 ML/MIN/{1.73_M2}
GLUCOSE BLDC GLUCOMTR-MCNC: 250 MG/DL (ref 70–99)
GLUCOSE BLDC GLUCOMTR-MCNC: 251 MG/DL (ref 70–99)
GLUCOSE BLDC GLUCOMTR-MCNC: 283 MG/DL (ref 70–99)
GLUCOSE BLDC GLUCOMTR-MCNC: 297 MG/DL (ref 70–99)
GLUCOSE SERPL-MCNC: 305 MG/DL (ref 70–99)
PLATELET # BLD AUTO: 196 10E9/L (ref 150–450)
POTASSIUM SERPL-SCNC: 3.2 MMOL/L (ref 3.4–5.3)
POTASSIUM SERPL-SCNC: 3.3 MMOL/L (ref 3.4–5.3)
POTASSIUM SERPL-SCNC: 3.6 MMOL/L (ref 3.4–5.3)
SODIUM SERPL-SCNC: 138 MMOL/L (ref 133–144)

## 2020-06-20 PROCEDURE — 84132 ASSAY OF SERUM POTASSIUM: CPT | Performed by: INTERNAL MEDICINE

## 2020-06-20 PROCEDURE — 25000132 ZZH RX MED GY IP 250 OP 250 PS 637: Performed by: INTERNAL MEDICINE

## 2020-06-20 PROCEDURE — 99239 HOSP IP/OBS DSCHRG MGMT >30: CPT | Performed by: FAMILY MEDICINE

## 2020-06-20 PROCEDURE — 84132 ASSAY OF SERUM POTASSIUM: CPT | Performed by: FAMILY MEDICINE

## 2020-06-20 PROCEDURE — 85049 AUTOMATED PLATELET COUNT: CPT | Performed by: INTERNAL MEDICINE

## 2020-06-20 PROCEDURE — 86341 ISLET CELL ANTIBODY: CPT | Performed by: FAMILY MEDICINE

## 2020-06-20 PROCEDURE — 00000146 ZZHCL STATISTIC GLUCOSE BY METER IP

## 2020-06-20 PROCEDURE — 36415 COLL VENOUS BLD VENIPUNCTURE: CPT | Performed by: INTERNAL MEDICINE

## 2020-06-20 PROCEDURE — 80048 BASIC METABOLIC PNL TOTAL CA: CPT | Performed by: FAMILY MEDICINE

## 2020-06-20 PROCEDURE — 25000131 ZZH RX MED GY IP 250 OP 636 PS 637: Performed by: FAMILY MEDICINE

## 2020-06-20 PROCEDURE — 36415 COLL VENOUS BLD VENIPUNCTURE: CPT | Performed by: FAMILY MEDICINE

## 2020-06-20 PROCEDURE — 25000131 ZZH RX MED GY IP 250 OP 636 PS 637: Performed by: INTERNAL MEDICINE

## 2020-06-20 RX ORDER — NICOTINE POLACRILEX 4 MG
15-30 LOZENGE BUCCAL
Status: DISCONTINUED | OUTPATIENT
Start: 2020-06-20 | End: 2020-06-20

## 2020-06-20 RX ORDER — DEXTROSE MONOHYDRATE 25 G/50ML
25-50 INJECTION, SOLUTION INTRAVENOUS
Status: DISCONTINUED | OUTPATIENT
Start: 2020-06-20 | End: 2020-06-20

## 2020-06-20 RX ADMIN — INSULIN GLARGINE 100 UNITS: 100 INJECTION, SOLUTION SUBCUTANEOUS at 07:48

## 2020-06-20 RX ADMIN — POTASSIUM CHLORIDE 40 MEQ: 20 TABLET, EXTENDED RELEASE ORAL at 06:05

## 2020-06-20 RX ADMIN — INSULIN ASPART 3 UNITS: 100 INJECTION, SOLUTION INTRAVENOUS; SUBCUTANEOUS at 11:28

## 2020-06-20 RX ADMIN — INSULIN ASPART 4 UNITS: 100 INJECTION, SOLUTION INTRAVENOUS; SUBCUTANEOUS at 07:52

## 2020-06-20 RX ADMIN — INSULIN ASPART 3 UNITS: 100 INJECTION, SOLUTION INTRAVENOUS; SUBCUTANEOUS at 16:55

## 2020-06-20 RX ADMIN — POTASSIUM CHLORIDE 20 MEQ: 20 TABLET, EXTENDED RELEASE ORAL at 07:50

## 2020-06-20 ASSESSMENT — ACTIVITIES OF DAILY LIVING (ADL)
ADLS_ACUITY_SCORE: 10

## 2020-06-20 ASSESSMENT — MIFFLIN-ST. JEOR: SCORE: 2317.63

## 2020-06-20 NOTE — PROGRESS NOTES
Pt self admin morning lantus and novolog, with no difficulty. Pt completed self blood glucose check this morning with this RN. Instructed pt on use of own BG monitor for home, using teach back method. Pt completed BG check with monitor with no difficulty. Reviewed sliding scale insulin discharge orders. Pt accurately dosing based off of BG for  pre meal, sliding scale insulin, and HS novolog. Beatrice Martínez RN BSN

## 2020-06-20 NOTE — PROGRESS NOTES
[place] [facility] DISCHARGE NOTE    Patient discharged to home at 5:59 PM via wheel chair. Accompanied by mother and staff. Discharge instructions reviewed with patient and Father & mother, opportunity offered to ask questions. Prescriptions sent to patients preferred pharmacy. All belongings sent with patient.    Beatrice Martínez RN

## 2020-06-20 NOTE — PROGRESS NOTES
[facility]      Subjective:  Feels back to normal  No cp  No abd pain      Review of Systems:  CONSTITUTIONAL: NEGATIVE for fever, chills, change in weight  ENT/MOUTH: NEGATIVE for ear, mouth and throat problems  RESP: NEGATIVE for significant cough or SOB  CV: NEGATIVE for chest pain, palpitations or peripheral edema    Physical Exam:  Vitals Were Reviewed    Patient Vitals for the past 16 hrs:   BP Temp Temp src Pulse Heart Rate Resp SpO2 Weight   06/20/20 0538 126/74 97.9  F (36.6  C) Oral -- 96 18 96 % --   06/20/20 0000 125/70 98.2  F (36.8  C) Oral 99 -- 18 97 % 134.4 kg (296 lb 4.8 oz)   06/19/20 1936 (!) 141/76 98  F (36.7  C) Oral 103 -- 20 97 % --   06/19/20 1630 (!) 149/97 -- -- 100 -- -- -- --   06/19/20 1600 (!) 145/96 -- -- 97 -- -- -- --   06/19/20 1527 (!) 148/101 -- -- 102 -- 20 98 % --   06/19/20 1507 137/76 98.2  F (36.8  C) Oral 102 -- 20 95 % --         Intake/Output Summary (Last 24 hours) at 6/20/2020 0655  Last data filed at 6/20/2020 0600  Gross per 24 hour   Intake 2828.42 ml   Output 1875 ml   Net 953.42 ml       GENERAL APPEARANCE: healthy, alert and no distress  EYES: conjunctiva clear, eyes grossly normal  RESP: lungs clear to auscultation - no rales, rhonchi or wheezes  CV: regular rate and rhythm, normal S1 S2, no S3 or S4 and no murmur, click or rub   ABDOMEN: soft, nontender, no HSM or masses and bowel sounds normal  MS: no clubbing, cyanosis; no edema  SKIN: clear without significant rashes or lesions    Lab:  Recent Labs   Lab Test 06/20/20  0535 06/19/20  1337 06/19/20  0550   NA  --  137 139   POTASSIUM 3.2* 3.4 3.1*   CHLORIDE  --  107 107   CO2  --  20 19*   ANIONGAP  --  10 13   GLC  --  290* 215*   BUN  --  7 7   CR  --  0.63 0.60   CAROL  --  8.5 8.7     CBC RESULTS:   Recent Labs   Lab Test 06/20/20  0535 06/18/20  0610  06/17/20  1704   WBC  --  13.4*  --  17.4*   RBC  --  5.56  --  6.36*   HGB  --  15.9  --  18.2*   HCT  --  47.7  --  55.4*   PLT  196 288   < > 436    < > = values in this interval not displayed.       Results for orders placed or performed during the hospital encounter of 06/17/20 (from the past 24 hour(s))   Glucose by meter   Result Value Ref Range    Glucose 197 (H) 70 - 99 mg/dL   Glucose by meter   Result Value Ref Range    Glucose 253 (H) 70 - 99 mg/dL   Glucose by meter   Result Value Ref Range    Glucose 240 (H) 70 - 99 mg/dL   Glucose by meter   Result Value Ref Range    Glucose 225 (H) 70 - 99 mg/dL   Basic metabolic panel   Result Value Ref Range    Sodium 137 133 - 144 mmol/L    Potassium 3.4 3.4 - 5.3 mmol/L    Chloride 107 98 - 110 mmol/L    Carbon Dioxide 20 20 - 32 mmol/L    Anion Gap 10 3 - 14 mmol/L    Glucose 290 (H) 70 - 99 mg/dL    Urea Nitrogen 7 7 - 30 mg/dL    Creatinine 0.63 0.50 - 1.00 mg/dL    GFR Estimate >90 >60 mL/min/[1.73_m2]    GFR Estimate If Black >90 >60 mL/min/[1.73_m2]    Calcium 8.5 8.5 - 10.1 mg/dL   Lactic acid level STAT   Result Value Ref Range    Lactate for Sepsis Protocol 1.2 0.7 - 2.0 mmol/L   Glucose by meter   Result Value Ref Range    Glucose 306 (H) 70 - 99 mg/dL   Glucose by meter   Result Value Ref Range    Glucose 242 (H) 70 - 99 mg/dL   Glucose by meter   Result Value Ref Range    Glucose 250 (H) 70 - 99 mg/dL   Platelet count   Result Value Ref Range    Platelet Count 196 150 - 450 10e9/L   Potassium   Result Value Ref Range    Potassium 3.2 (L) 3.4 - 5.3 mmol/L       Assessment and Plan:    Date of Service (when I saw the patient): 06/19/2020     REASON FOR ADMISSION / INTERVAL HISTORY:  Mynor Rollins is a 19 year old male admitted on 6/17/2020. He presented to the emergency department for evaluation of a 3 day history of chest pain, shortness of breath, polydipsia, and polyuria. Has DKA and is being rx as below. Feels better.     ASSESSMENT/PLAN:      DKA (diabetic ketoacidoses)  Newly diagnosed diabetes - unclear type  No known prior history of diabetes mellitus, but family history  of type 2 (father, paternal grandmother). A1c 11.3. Presented with anion gap 24, metabolic acidosis on VBG (pH 7.09 / pCO2 24 / bicarb 7), ketones 4.6, lactic acid 2.8. Glucose 596. Was given 1L NS in the emergency department and started on insulin drip. Admitted  to ICU on insulin drip. RITESH antibody pending  Continued to be in severe DKA- until this morning--improving slowly. Bicarb now 19 -AG improved to 13 from 21. Tachycardia improved.  Calculated TDD of insulin for 24 hours was 300units. Will start with 100units lantus dalily and 25units premeal insulin tid, ISS. Would monitor in hospital.    insulint dosing is high for presumed type 1- will continue teaching and observe until after dinner           Elevated d-dimer  Admit d-dimer 0.8. CT PE was negative for pulmonary embolism. No obvious evidence of leg swelling to indicate DVT.     Chest pain - pleuritic   Sharp pain with inspiration. Chest CT was negative for PE, no other obvious cause for pain. EKG without ST changes, troponin negative. Likely due to DKA.  Resolved.      Leukocytosis   Admit WBC 17.4. Afebrile. Chest CT unremarkable. No evidence of infection on review of systems or exam. Possibly stress reaction related to DKA.  Improving.      Morbid obesity  Admit BMI 47.61. Contributes to morbidity and mortality.    Plan-teach glucometer use, injection, meall time calculation.

## 2020-06-20 NOTE — DISCHARGE SUMMARY
Admit Date:     06/17/2020   Discharge Date:      06/20/20      Mynor Rollins is a 19-year-old male without significant past medical history.  He presented to the Emergency Department because of increased thirst, urination and some pleuritic chest pain.  He also had had some nausea and vomiting and headaches in the days prior to admission.  He had no fever or chills.      Upon admission, the patient was found to have a glucose of 596, ketones of 4.6, pH 7.09, a pCO2 of 24.  He was given fluids and started on an insulin drip and treated for DKA.      The patient's A1c was 11.3.  He was transitioned off the insulin drip.  Dr. Troy noted that he had high insulin requirements on the drip.  His RITESH antibody was negative.  He was noted to use 300 units of insulin in a 24-hour period.  Therefore, Dr. Troy started him on relatively high doses of Lantus and premeal insulin.  His blood sugars came down into the 200 range.  I saw him just on the morning of discharge.  At that time, his blood sugar was 297.  I continued him on 100 of Lantus, 25 units of mealtime insulin plus medium sliding scale.  At this time, we are planning on observing him throughout the day.  He is being taught how to use a glucometer, calculate his insulin dose and administer insulin.  If he is stable and not having hypoglycemia later today, he can discharge.  He will need ongoing evaluation and we are referring him to Endocrinology and setting him up to see his primary care doctor in an expedited fashion.      ASSESSMENT:   1.  Diabetic ketoacidosis.   2.  Type unclear: Presented with ketoacidosis but RITESH antibody negative and insulin requirements are high.   3.  Pleuritic chest pain that resolved, chest CT PE protocol was negative.   4.  Initial leukocytosis -- probably reactive.   5.  Morbid obesity with BMI of 47.61.      PLAN:  As above, he is receiving teaching today.  If he is stable and not hypoglycemic later today and his blood sugar is  relatively well controlled, he will discharge.  He will have early followup with primary doctor and is being referred to Endocrinology.  If he has significant problems he is to return to the ER.  I discussed how to respond to hypoglycemia.  If he does get hypoglycemia I want him to call physician or come to the ER.     Current Discharge Medication List      START taking these medications    Details   blood glucose (NO BRAND SPECIFIED) lancets standard Use to test blood sugar 4 times daily or as directed.  Qty: 200 each, Refills: 0    Associated Diagnoses: Diabetic ketoacidosis without coma associated with diabetes mellitus due to underlying condition (H)      blood glucose (NO BRAND SPECIFIED) test strip Use to test blood sugar 4 times daily or as directed.  Qty: 200 strip, Refills: 1    Associated Diagnoses: Diabetic ketoacidosis without coma associated with diabetes mellitus due to underlying condition (H)      blood glucose monitoring (NO BRAND SPECIFIED) meter device kit Use to test blood sugar 4 times daily or as directed.  Qty: 1 kit, Refills: 0    Associated Diagnoses: Diabetic ketoacidosis without coma associated with diabetes mellitus due to underlying condition (H)      !! insulin aspart (NOVOLOG PEN) 100 UNIT/ML pen Inject 25 Units Subcutaneous 3 times daily (with meals) Plus the medium sliding scale.  Qty: 9 mL, Refills: 1    Associated Diagnoses: Diabetic ketoacidosis without coma associated with diabetes mellitus due to underlying condition (H)      !! insulin aspart (NOVOLOG PEN) 100 UNIT/ML pen Inject 1-7 Units Subcutaneous 3 times daily (before meals)  Qty:        !! insulin aspart (NOVOLOG PEN) 100 UNIT/ML pen Inject 1-5 Units Subcutaneous At Bedtime  Qty:  , Refills: 0      insulin glargine (LANTUS PEN) 100 UNIT/ML pen Inject 100 Units Subcutaneous every morning (before breakfast)  Qty: 15 mL, Refills: 0    Comments: If Lantus is not covered by insurance, may substitute Basaglar at same dose and  frequency.    Associated Diagnoses: Diabetic ketoacidosis without coma associated with diabetes mellitus due to underlying condition (H)      insulin pen needle (32G X 4 MM) 32G X 4 MM miscellaneous Use 5 pen needles daily or as directed.  Qty: 200 each, Refills: 1    Associated Diagnoses: Diabetic ketoacidosis without coma associated with diabetes mellitus due to underlying condition (H)       !! - Potential duplicate medications found. Please discuss with provider.        Unresulted Labs Ordered in the Past 30 Days of this Admission     Date and Time Order Name Status Description    2020 0953 Potassium In process     2020 0659 Glutamic acid decarboxylase antibody In process     2020 2246 Creatinine In process               TOTAL TIME SPENT:  Greater than 30 minutes spent on this.     12:16 PM         GAGE WONG MD             D: 2020   T: 2020   MT: PRATIK      Name:     PRO ALEJANDRO   MRN:      [id number]        Account:        [id number]   :      2000           Admit Date:     2020                                  Discharge Date:       Document: [id number]

## 2020-06-20 NOTE — PROGRESS NOTES
Pt A&Ox4, up in room independently with steady gait. Denies having any pain. Uses call light appropriately.

## 2020-06-22 ENCOUNTER — VIRTUAL VISIT (OUTPATIENT)
Dept: ENDOCRINOLOGY | Facility: CLINIC | Age: 20
End: 2020-06-22

## 2020-06-22 DIAGNOSIS — E10.65 TYPE 1 DIABETES MELLITUS WITH HYPERGLYCEMIA (H): Primary | ICD-10-CM

## 2020-06-22 LAB — GAD65 AB SER IA-ACNC: <5 IU/ML (ref 0–5)

## 2020-06-22 PROCEDURE — 99202 OFFICE O/P NEW SF 15 MIN: CPT | Mod: 95 | Performed by: INTERNAL MEDICINE

## 2020-06-22 RX ORDER — INSULIN GLARGINE 100 [IU]/ML
100 INJECTION, SOLUTION SUBCUTANEOUS DAILY
Qty: 30 ML | Refills: 11 | Status: SHIPPED | OUTPATIENT
Start: 2020-06-22 | End: 2020-06-26

## 2020-06-22 NOTE — LETTER
"    6/22/2020         RE: Mynor Rollins  [id number] [facility] [address]        Dear Colleague,    Thank you for referring your patient, Mynor Rollins, to the [facility]. Please see a copy of my visit note below.    Mynor Rollins is a 19 year old male who is being evaluated via a billable telephone visit.      The patient has been notified of following:     \"This telephone visit will be conducted via a call between you and your physician/provider. We have found that certain health care needs can be provided without the need for a physical exam.  This service lets us provide the care you need with a short phone conversation.  If a prescription is necessary we can send it directly to your pharmacy.  If lab work is needed we can place an order for that and you can then stop by our lab to have the test done at a later time.    Telephone visits are billed at different rates depending on your insurance coverage. During this emergency period, for some insurers they may be billed the same as an in-person visit.  Please reach out to your insurance provider with any questions.    If during the course of the call the physician/provider feels a telephone visit is not appropriate, you will not be charged for this service.\"    Patient has given verbal consent for Telephone visit?  Yes    What phone number would you like to be contacted at? [phone]    How would you like to obtain your AVS? Mail a copy    CC: Type 1 DM    HPI: Patient presents for management of type 1 DM.   Diagnosed with DM on 6/17/2020.   Presented in DKA.     Discharged on basaglar 100 U every day, and novolog 25 units with meals.   Sliding scale is 140-189 1 unit, 190-239 2 unit, 240-289 3 units, 290-339 4 units, 340-399 5 units, 400-449 6 units.     6/20:  312 (dinner)    6/21:  329, 321, 328    6/22:  289     He is trying to stick to 60-70 grams of carbs with meals.       ROS: 10 point ROS neg other than the symptoms noted above in " the HPI.    PMH:   Patient Active Problem List   Diagnosis     DKA (diabetic ketoacidoses) (H)     Morbid obesity (H)     Newly diagnosed diabetes (H)     Elevated d-dimer     Meds:  Current Outpatient Medications   Medication     blood glucose (NO BRAND SPECIFIED) lancets standard     blood glucose (NO BRAND SPECIFIED) test strip     blood glucose monitoring (NO BRAND SPECIFIED) meter device kit     insulin aspart (NOVOLOG PEN) 100 UNIT/ML pen     insulin aspart (NOVOLOG PEN) 100 UNIT/ML pen     insulin aspart (NOVOLOG PEN) 100 UNIT/ML pen     insulin glargine (LANTUS PEN) 100 UNIT/ML pen     insulin pen needle (32G X 4 MM) 32G X 4 MM miscellaneous     No current facility-administered medications for this visit.      FHX:   Father has type 2 DM.     SHX:  Working until COVID. He was working as a .   Non-smoker.     Exam:   Gen: In NAD.     A/P:   Type 1 DM - New diagnosis in 6/2020. Presented with DKA. High insulin requirements. His sliding scale not aggressive enough based on his TDI. ICR 2.5, ISF 8.5. As such, 25 units of insulin would cover 63 grams of carbs. Discussed reports about GLP-1 and DDP-4 inhibitor with pancreatitis and pancreatic cancer. Although I cannot definitively say the patient will not get pancreatitis or pancreatic cancer, to date there is no conclusive evidence of a causal link with these agents for pancreatic cancer. There is a slight increase in  risk of pancreatitis but overall risk still low. Persons with DM tend to have more pancreatitis and pancreatic cancer de cee.  -Meet with educator 6/26 as planned.   -Change sliding scale from 1/40 to 4 units per 40.    -Labs in 4 weeks.   -Possible GLP-1 agonist in the future given his weight. Patient would like to think about it more.   -ASA not indicated.  -BP: controlled on last check.   -Lipids: check once DM under better control. Statin not indicated  -Microalbumin check once DM under better control. ACEi not indicated.    -TSH due.   -Eyes: due in 2025. He does where glasses and will have a yearly eye exam anyway.   -Smoking: none.         Due to the COVID 19 pandemic this visit was a telephone/video visit in order to help prevent spread of infection in this high risk patient and the general population. The patient gave verbal consent for the visit today.    Start time 1100  Stop time 1125  Total time 25  This visit would have been billed as [id number] as an E & M code    Allan Sarabia MD on 6/22/2020 at 11:28 AM        Again, thank you for allowing me to participate in the care of your patient.        Sincerely,        Allan Sarabia MD

## 2020-06-22 NOTE — PROGRESS NOTES
"Mynor Rollins is a 19 year old male who is being evaluated via a billable telephone visit.      The patient has been notified of following:     \"This telephone visit will be conducted via a call between you and your physician/provider. We have found that certain health care needs can be provided without the need for a physical exam.  This service lets us provide the care you need with a short phone conversation.  If a prescription is necessary we can send it directly to your pharmacy.  If lab work is needed we can place an order for that and you can then stop by our lab to have the test done at a later time.    Telephone visits are billed at different rates depending on your insurance coverage. During this emergency period, for some insurers they may be billed the same as an in-person visit.  Please reach out to your insurance provider with any questions.    If during the course of the call the physician/provider feels a telephone visit is not appropriate, you will not be charged for this service.\"    Patient has given verbal consent for Telephone visit?  Yes    What phone number would you like to be contacted at? [phone]    How would you like to obtain your AVS? Mail a copy    CC: Type 1 DM    HPI: Patient presents for management of type 1 DM.   Diagnosed with DM on 6/17/2020.   Presented in DKA.     Discharged on basaglar 100 U every day, and novolog 25 units with meals.   Sliding scale is 140-189 1 unit, 190-239 2 unit, 240-289 3 units, 290-339 4 units, 340-399 5 units, 400-449 6 units.     6/20:  312 (dinner)    6/21:  329, 321, 328    6/22:  289     He is trying to stick to 60-70 grams of carbs with meals.       ROS: 10 point ROS neg other than the symptoms noted above in the HPI.    PMH:   Patient Active Problem List   Diagnosis     DKA (diabetic ketoacidoses) (H)     Morbid obesity (H)     Newly diagnosed diabetes (H)     Elevated d-dimer     Meds:  Current Outpatient Medications   Medication     blood " glucose (NO BRAND SPECIFIED) lancets standard     blood glucose (NO BRAND SPECIFIED) test strip     blood glucose monitoring (NO BRAND SPECIFIED) meter device kit     insulin aspart (NOVOLOG PEN) 100 UNIT/ML pen     insulin aspart (NOVOLOG PEN) 100 UNIT/ML pen     insulin aspart (NOVOLOG PEN) 100 UNIT/ML pen     insulin glargine (LANTUS PEN) 100 UNIT/ML pen     insulin pen needle (32G X 4 MM) 32G X 4 MM miscellaneous     No current facility-administered medications for this visit.      FHX:   Father has type 2 DM.     SHX:  Working until COVID. He was working as a .   Non-smoker.     Exam:   Gen: In NAD.     A/P:   Type 1 DM - New diagnosis in 6/2020. Presented with DKA. High insulin requirements. His sliding scale not aggressive enough based on his TDI. ICR 2.5, ISF 8.5. As such, 25 units of insulin would cover 63 grams of carbs. Discussed reports about GLP-1 and DDP-4 inhibitor with pancreatitis and pancreatic cancer. Although I cannot definitively say the patient will not get pancreatitis or pancreatic cancer, to date there is no conclusive evidence of a causal link with these agents for pancreatic cancer. There is a slight increase in  risk of pancreatitis but overall risk still low. Persons with DM tend to have more pancreatitis and pancreatic cancer de cee.  -Meet with educator 6/26 as planned.   -Change sliding scale from 1/40 to 4 units per 40.    -Labs in 4 weeks.   -Possible GLP-1 agonist in the future given his weight. Patient would like to think about it more.   -ASA not indicated.  -BP: controlled on last check.   -Lipids: check once DM under better control. Statin not indicated  -Microalbumin check once DM under better control. ACEi not indicated.   -TSH due.   -Eyes: due in 2025. He does where glasses and will have a yearly eye exam anyway.   -Smoking: none.         Due to the COVID 19 pandemic this visit was a telephone/video visit in order to help prevent spread of infection in this  high risk patient and the general population. The patient gave verbal consent for the visit today.    Start time 1100  Stop time 1125  Total time 25  This visit would have been billed as [id number] as an E & M code    Allan Sarabia MD on 6/22/2020 at 11:28 AM

## 2020-06-22 NOTE — Clinical Note
Please update me on his progress. His sliding scale was way too weak. Also trying to get him interested in a GLP-1 agonist.     Best, Allan Sarabia MD on 6/22/2020 at 11:21 AM

## 2020-06-26 ENCOUNTER — ALLIED HEALTH/NURSE VISIT (OUTPATIENT)
Dept: EDUCATION SERVICES | Facility: CLINIC | Age: 20
End: 2020-06-26

## 2020-06-26 DIAGNOSIS — E10.65 TYPE 1 DIABETES MELLITUS WITH HYPERGLYCEMIA (H): ICD-10-CM

## 2020-06-26 DIAGNOSIS — E08.10 DIABETIC KETOACIDOSIS WITHOUT COMA ASSOCIATED WITH DIABETES MELLITUS DUE TO UNDERLYING CONDITION (H): ICD-10-CM

## 2020-06-26 PROCEDURE — G0108 DIAB MANAGE TRN  PER INDIV: HCPCS | Mod: 95

## 2020-06-26 RX ORDER — INSULIN GLARGINE 100 [IU]/ML
106 INJECTION, SOLUTION SUBCUTANEOUS DAILY
Qty: 30 ML | Refills: 11 | Status: SHIPPED | OUTPATIENT
Start: 2020-06-26 | End: 2020-07-10

## 2020-06-26 NOTE — PROGRESS NOTES
"Diabetes Self-Management Education & Support  Patient verbally consented to the telephone visit service today: yes      Presents for: Initial Assessment for new diagnosis    SUBJECTIVE/OBJECTIVE:  Presents for: Initial Assessment for new diagnosis  Accompanied by: Self by phone   Diabetes education in the past 24mo: No  Focus of Visit: Assistance w/ making life changes  Diabetes type: Type 1  Date of diagnosis: 6/17/20  Diabetes management related comments/concerns: Denies any questions right now. Says dad also has diabetes- Type 2 so has been helping with food choices.  Says cutting back on grains (rice) and eating more veggies.    No Low blood glucose.  Says weight stable at 301 lbs.     Transportation concerns: No  Difficulty affording diabetes medication?: No  Difficulty affording diabetes testing supplies?: No  Cultural Influences/Ethnic Background:  American    Diabetes Symptoms & Complications:  Fatigue: Yes  Neuropathy: No  Polydipsia: Yes  Polyphagia: No  Polyuria: Yes  Visual change: No  Slow healing wounds: No  Other: Yes(Wt loss)  Weight trend: Stable  Complications assessed today?: Yes  Autonomic neuropathy: No  CVA: No  Heart disease: No  Nephropathy: No  Peripheral neuropathy: No  Peripheral Vascular Disease: No  Retinopathy: No  Sexual dysfunction: No    Patient Problem List and Family Medical History reviewed for relevant medical history, current medical status, and diabetes risk factors.    Vitals:  There were no vitals taken for this visit.  Estimated body mass index is 46.41 kg/m  as calculated from the following:    Height as of 6/17/20: 1.702 m (5' 7\").    Weight as of 6/20/20: 134.4 kg (296 lb 4.8 oz).   Last 3 BP:   BP Readings from Last 3 Encounters:   06/20/20 126/81       History   Smoking Status     Never Smoker   Smokeless Tobacco     Never Used       Labs:  Lab Results   Component Value Date    A1C 11.3 06/17/2020     Lab Results   Component Value Date     06/20/2020     No " results found for: LDL  No results found for: HDL]  GFR Estimate   Date Value Ref Range Status   06/20/2020 >90 >60 mL/min/[1.73_m2] Final     Comment:     Non  GFR Calc  Starting 12/18/2018, serum creatinine based estimated GFR (eGFR) will be   calculated using the Chronic Kidney Disease Epidemiology Collaboration   (CKD-EPI) equation.       GFR Estimate If Black   Date Value Ref Range Status   06/20/2020 >90 >60 mL/min/[1.73_m2] Final     Comment:      GFR Calc  Starting 12/18/2018, serum creatinine based estimated GFR (eGFR) will be   calculated using the Chronic Kidney Disease Epidemiology Collaboration   (CKD-EPI) equation.       Lab Results   Component Value Date    CR 0.57 06/20/2020     No results found for: MICROALBUMIN    Healthy Eating:  Healthy Eating Assessed Today: Yes  Cultural/Congregational diet restrictions?: No  Meal planning/habits: Smaller portions  Meals include: Breakfast, Lunch, Dinner  Breakfast: Meal 1: grilled chicken, veggies and 1 cup white rice(Wakes 5pm.)  Lunch: Meal 2: Pasta- 1 cup(12am)  Dinner: Meal 3: grilled chicken, veggies and 1 cup white rice(3am)  Snacks: Snack: Fruit (banana)- 10 pm  Beverages: Water    Being Active:  Being Active Assessed Today: Yes  Exercise:: Currently not exercising  Barrier to exercise: None    Monitoring:  Monitoring Assessed Today: Yes  Did patient bring glucose meter to appointment? : Yes  Blood Glucose Meter: One Touch(Ultra2 and Vital)  Times checking blood sugar at home (number): 3  Times checking blood sugar at home (per): Day    Blood glucose since last insulin adjustment:  6/26: 202   6/25: 238, 201, 218  6/24: 270, 280, 267  6/23: 290, 329, 324    Taking Medications:  Diabetes Medication(s)     Insulin       insulin aspart (NOVOLOG PEN) 100 UNIT/ML pen    Inject 25 Units Subcutaneous 3 times daily (with meals) Plus the medium sliding scale.     insulin glargine (BASAGLAR KWIKPEN) 100 UNIT/ML pen    Inject 100 Units  Subcutaneous daily          Taking Medication Assessed Today: Yes  Current Treatments: Insulin Injections  Dose schedule: Pre-breakfast, Pre-lunch, Pre-dinner  Given by: Patient  Injection/Infusion sites: Abdomen  Problems taking diabetes medications regularly?: No  Diabetes medication side effects?: No    Problem Solving:  Problem Solving Assessed Today: Yes  Is the patient at risk for hypoglycemia?: Yes  Hypoglycemia Frequency: Never  Hypoglycemia Treatment: (None- not carry food right now)  Does patient have glucagon emergency kit?: No(Plans to discuss with his parents)  Is the patient at risk for DKA?: Yes  Does patient have ketone test strips?: No(Declines wanting to order right now)  Does patient have DKA prevention plan?: No              Reducing Risks:  Reducing Risks Assessed Today: No    Healthy Coping:  Healthy Coping Assessed Today: No  Patient Activation Measure Survey Score:  No flowsheet data found.    Diabetes knowledge and skills assessment:   Patient is knowledgeable in diabetes management concepts related to: Healthy Eating, Being Active, Monitoring and Taking Medication    Patient needs further education on the following diabetes management concepts: Healthy Eating, Being Active, Monitoring, Taking Medication, Problem Solving, Reducing Risks and Healthy Coping    Based on learning assessment above, most appropriate setting for further diabetes education would be: Group class or Individual setting.      INTERVENTIONS:    Education provided today on:  AADE Self-Care Behaviors:  Diabetes Pathophysiology  Healthy Eating: carbohydrate counting, consistency in amount, composition, and timing of food intake and portion control  Being Active: relationship to blood glucose, describe appropriate activity program and precautions to take  Monitoring: purpose, log and interpret results, individual blood glucose targets, frequency of monitoring, ketone testing and proper sharps disposal  Taking Medication:  action of prescribed medication, drawing up, administering and storing injectable diabetes medications, proper site selection and rotation for injections, side effects of prescribed medications and when to take medications  Problem Solving: high blood glucose - causes, signs/symptoms, treatment and prevention, low blood glucose - causes, signs/symptoms, treatment and prevention, carrying a carbohydrate source at all times and when to call health care provider    Opportunities for ongoing education and support in diabetes-self management were discussed.    Pt verbalized understanding of concepts discussed and recommendations provided today.       Education Materials Provided:  [place] Understanding Diabetes Booklet, Safe Disposal Options for Needles & Syringes, BG Log Sheet, Ketone testing information and Sick Day Management for Type 1 DM      ASSESSMENT:  Since last insulin adjustment, patient is seeing all blood glucose >200 mg/dL so would recommended increasing insulin 0.1 unit(s)/kg CBW or up to 13 units.  Recommended he increase Lantus to 106 units (6 unit increase) and Novolog by 2 units each meal, increasing to 27 units. Tried to clean up med record and discontinued Lantus since patient reports taking Basaglar and the other 2 Novolog amounts 1-7 at meals and 1-5 before bed since patient states not taking any Novolog at bed.    Discussed difference between type 1 and type 2 diabetes.  Patient states he received a lot of this information in the hospital about carbohydrates, checking blood glucose and taking insulin.  Could not recall target blood glucose so this was reviewed and commended patient on food changes to reduce rice and pasta portions to 1 cup.  Reviewed carbohydrate sources and covering with insulin.  Will watch fruit when consumed between meals as this may raise his blood glucose if no Novolog is taken. He verbalized understanding.    Patient did not recall how to treat hypoglycemia if it occurs so  reviewed signs, symptoms, treatment and safety and he was encouraged to carry rapid-acting glucose at all times.  Discussed glucagon and when to check for ketones to try to avoid DKA.  Pt verbalized understanding of concepts discussed and recommendations provided.       Patient's most recent   Lab Results   Component Value Date    A1C 11.3 06/17/2020       PLAN  See Patient Instructions for co-developed, patient-stated behavior change goals.  AVS emailed to patient today. See Follow-Up section for recommended follow-up.    Alise Pinzon RDN, RAFAEL, DOLLY   Time Spent: 50 minutes  Encounter Type: Individual TELEPHONE visit    Any diabetes medication dose changes were made via the CDE Protocol and Collaborative Practice Agreement with the patient's referring provider. A copy of this encounter was shared with the provider.    Email to patient:     Marshal Prado,    It was great to meet and speak with you today!  Here is a summary of what we discussed and insulin changes.  Will mail the handouts to you:    1. Please increase your Basaglar to 106 units in the morning (6 unit increase).    2. Please increase Novolog to 27 units before each meal (2 unit increase at each meal)    3. Start to carry rapid-acting carbohydrate source at all times in case blood glucose randomly drop too low. Keep something in the car, when out of the house, by bedside.  Good options include glucose tablets, box of raisins, small juice box.    Hypoglycemia: Low blood glucose of below 70 mg/dL.  Treat with 15 gm rapid-acting carbohydrates and wait 15 minutes.  Recheck blood glucose and repeat if still <70 mg/dL.    4. It is recommended to check for ketones when you have 2 blood glucose >240 mg/dL and are not feeling well     5. Talk to parents if interested in a Glucagon injection kit.  This is something they can assist with if you have a really low blood glucose and you are not able to eat or drink, to help raise blood glucose.      FOLLOW UP: We will  give you a call around 12pm (noon) on Friday, July 10th to follow up and review blood glucose values.    Please call us with any questions/concerns.    Thanks,

## 2020-06-26 NOTE — PATIENT INSTRUCTIONS
1. Please increase your Basaglar to 106 units in the morning (6 unit increase).    2. Please increase Novolog to 27 units before each meal (2 unit increase at each meal)    3. Start to carry rapid-acting carbohydrate source at all times in case blood glucose randomly drop too low. Keep something in the car, when out of the house, by bedside.  Good options include glucose tablets, box of raisins, small juice box.    Hypoglycemia: Low blood glucose of below 70 mg/dL.  Treat with 15 gm rapid-acting carbohydrates and wait 15 minutes.  Recheck blood glucose and repeat if still <70 mg/dL.    4. It is recommended to check for ketones when you have 2 blood glucose >240 mg/dL and are not feeling well     5. Talk to parents if interested in a Glucagon injection kit.  This is something they can assist with if you have a really low blood glucose and you are not able to eat or drink, to help raise blood glucose.      FOLLOW UP: We will give you a call around 12pm (noon) on Friday, July 10th to review blood glucose values.      Alise Pinzon RDN, LD, [facility]   [phone]

## 2020-06-26 NOTE — Clinical Note
Hi Dr. Troy and Dr. Sarabia,   Just FYI- Had Johan increase Novolog by 2 units each meal and Basaglar by 6 units due to all blood glucose still above 200 mg/dL since last adjustments on Monday.    Also started diabetes education today. Ongoing plan for education and support: Follow-up visit with diabetes educator in 2 weeks. If there are any questions or recommended changes to the patient's diabetes care plan in the future, I will be in touch. Thank you for your referral!     (This communication is being sent to you in order to comply with our American Association of Diabetes Educators accreditation standards.)      Thanks,  Alise Pinzon RDN, LD, [facility]

## 2020-07-10 ENCOUNTER — ALLIED HEALTH/NURSE VISIT (OUTPATIENT)
Dept: EDUCATION SERVICES | Facility: CLINIC | Age: 20
End: 2020-07-10

## 2020-07-10 DIAGNOSIS — E11.9 NEWLY DIAGNOSED DIABETES (H): Primary | ICD-10-CM

## 2020-07-10 DIAGNOSIS — E10.65 TYPE 1 DIABETES MELLITUS WITH HYPERGLYCEMIA (H): ICD-10-CM

## 2020-07-10 PROCEDURE — G0108 DIAB MANAGE TRN  PER INDIV: HCPCS

## 2020-07-10 RX ORDER — INSULIN GLARGINE 100 [IU]/ML
110 INJECTION, SOLUTION SUBCUTANEOUS DAILY
Qty: 36 ML | Refills: 11 | Status: SHIPPED | OUTPATIENT
Start: 2020-07-10 | End: 2020-07-24

## 2020-07-10 NOTE — Clinical Note
Hi Dr. Troy and Dr. Sarabia,    Had follow up visit today with Johan and blood glucose pretty steady in the 140's and he denies hypoglycemia.  Had him increase Basaglar to 110 units (4 unit increase) and plan to follow up in 2 weeks. Also checked on trying a GLP-1 but Johan is not interested at this time.    Thanks,  Alise Pinzon,  SHAWNN, LD, [facility]

## 2020-07-10 NOTE — PROGRESS NOTES
"12:05pm: Called patient but no answer.  Left message would try back in 5 minutes.     Was able to reach Johan at 12:10pm.    Diabetes Self-Management Education & Support  Patient verbally consented to the telephone visit service today: yes     Presents for: Follow-up    SUBJECTIVE/OBJECTIVE:  Presents for: Follow-up  Accompanied by: Self  Diabetes education in the past 24mo: Yes  Focus of Visit: Assistance w/ making life changes  Diabetes type: Type 1  Date of diagnosis: 6/17/20  Diabetes management related comments/concerns: Says feels like things are pretty straight forward.  No questions today.    Says blood glucose is around 140 mg/dL.      Wants to keep working with insulin, not make changes to try GLP-1.   Says not interested in the glucagon emergency kit or ketone strips at this time.  Thinks received his education in the mail but his mom threw away.  Would like it remailed if possible.    Transportation concerns: No  Difficulty affording diabetes medication?: No  Difficulty affording diabetes testing supplies?: No  Other concerns:: Glasses  Cultural Influences/Ethnic Background:  American    Diabetes Symptoms & Complications:  Fatigue: Yes  Neuropathy: No  Polydipsia: Yes  Polyphagia: No  Polyuria: Yes  Visual change: No  Slow healing wounds: No  Other: Yes(Wt loss)  Weight trend: Stable  Complications assessed today?: Yes  Autonomic neuropathy: No  CVA: No  Heart disease: No  Nephropathy: No  Peripheral neuropathy: No  Peripheral Vascular Disease: No  Retinopathy: No  Sexual dysfunction: No    Patient Problem List and Family Medical History reviewed for relevant medical history, current medical status, and diabetes risk factors.    Vitals:  There were no vitals taken for this visit.  Estimated body mass index is 46.41 kg/m  as calculated from the following:    Height as of 6/17/20: 1.702 m (5' 7\").    Weight as of 6/20/20: 134.4 kg (296 lb 4.8 oz).   Last 3 BP:   BP Readings from Last 3 Encounters: "   06/20/20 126/81       History   Smoking Status     Never Smoker   Smokeless Tobacco     Never Used       Labs:  Lab Results   Component Value Date    A1C 11.3 06/17/2020     Lab Results   Component Value Date     06/20/2020     No results found for: LDL  No results found for: HDL]  GFR Estimate   Date Value Ref Range Status   06/20/2020 >90 >60 mL/min/[1.73_m2] Final     Comment:     Non  GFR Calc  Starting 12/18/2018, serum creatinine based estimated GFR (eGFR) will be   calculated using the Chronic Kidney Disease Epidemiology Collaboration   (CKD-EPI) equation.       GFR Estimate If Black   Date Value Ref Range Status   06/20/2020 >90 >60 mL/min/[1.73_m2] Final     Comment:      GFR Calc  Starting 12/18/2018, serum creatinine based estimated GFR (eGFR) will be   calculated using the Chronic Kidney Disease Epidemiology Collaboration   (CKD-EPI) equation.       Lab Results   Component Value Date    CR 0.57 06/20/2020     No results found for: MICROALBUMIN    Healthy Eating:  Healthy Eating Assessed Today: Yes  Cultural/Yazdanism diet restrictions?: No  Meal planning/habits: Smaller portions(Cut down on snacks)  Meals include: Breakfast, Lunch, Dinner  Breakfast: Meal 1: grilled chicken, veggies and 1 cup white rice(Wakes 5pm.)  Lunch: Meal 2: Pasta- 1 cup OR beef broth with veggies and rice(12am)  Dinner: Meal 3: grilled chicken, veggies and 1 cup white rice OR soup with veggies and rice(3am)  Snacks: None  Beverages: Water  Has patient met with a dietitian in the past?: No    Being Active:  Being Active Assessed Today: Yes  Exercise:: Currently not exercising  Barrier to exercise: None    Monitoring:  Monitoring Assessed Today: Yes  Did patient bring glucose meter to appointment? : Yes  Blood Glucose Meter: One Touch(Ultra2 and Vital)  Times checking blood sugar at home (number): 3  Times checking blood sugar at home (per): Day    Blood glucose:  7/10: 146  7/9: 147, 145,  143  7/8: 143, 142, 140  7/7: 146, 144, 145  7/6: 144, 146, 140    Taking Medications:  Diabetes Medication(s)     Insulin       insulin aspart (NOVOLOG PEN) 100 UNIT/ML pen    Inject 27 Units Subcutaneous 3 times daily (with meals) Plus the medium sliding scale.     insulin glargine (BASAGLAR KWIKPEN) 100 UNIT/ML pen    Inject 106 Units Subcutaneous daily          Taking Medication Assessed Today: Yes  Current Treatments: Insulin Injections  Dose schedule: Pre-breakfast, Pre-lunch, Pre-dinner(Takes Basaglar in the morning.)  Given by: Patient  Injection/Infusion sites: Abdomen  Problems taking diabetes medications regularly?: No  Diabetes medication side effects?: No    Problem Solving:  Problem Solving Assessed Today: Yes  Is the patient at risk for hypoglycemia?: Yes  Hypoglycemia Frequency: Never  Hypoglycemia Treatment: (None- not carry food right now)  Does patient have glucagon emergency kit?: No(Plans to discuss with his parents)  Is the patient at risk for DKA?: Yes  Does patient have ketone test strips?: No(Declines wanting to order right now)  Does patient have DKA prevention plan?: No              Reducing Risks:  Reducing Risks Assessed Today: Yes  Diabetes Risks: Family History  CAD Risks: Diabetes Mellitus, Male sex  Has dilated eye exam at least once a year?: No  Sees dentist every 6 months?: No  Feet checked by healthcare provider in the last year?: No    Healthy Coping:  Healthy Coping Assessed Today: Yes  Emotional response to diabetes: Ready to learn, Acceptance  Informal Support system:: Family  Stage of change: ACTION (Actively working towards change)  Support resources: Websites  Patient Activation Measure Survey Score:  No flowsheet data found.    Diabetes knowledge and skills assessment:   Patient is knowledgeable in diabetes management concepts related to: Healthy Eating, Being Active, Monitoring, Taking Medication and Problem Solving    Patient needs further education on the following  diabetes management concepts: Reducing Risks and Healthy Coping    Based on learning assessment above, most appropriate setting for further diabetes education would be: Group class or Individual setting.      INTERVENTIONS:    Education provided today on:  AADE Self-Care Behaviors:  Monitoring: purpose, log and interpret results, individual blood glucose targets and frequency of monitoring  Taking Medication: action of prescribed medication and when to take medications  Reducing Risks: major complications of diabetes, prevention, early diagnostic measures and treatment of complications, foot care, appropriate dental care, annual eye exam and A1C - goals, relating to blood glucose levels, how often to check  Healthy Coping: recognize feelings about diagnosis, benefits of making appropriate lifestyle changes, utilize support systems and methods for coping with stress    Opportunities for ongoing education and support in diabetes-self management were discussed.    Pt verbalized understanding of concepts discussed and recommendations provided today.       Education Materials Provided:  [place] Understanding Diabetes Booklet, Safe Disposal Options for Needles & Syringes, BG Log Sheet and DKA (maied again- thinks mom threw away last material)      ASSESSMENT:  Patient is seeing pretty stable blood glucose control as in the past 5 days, all pre-meal blood glucose in the 140's. Revisisted thoughts on trying a weekly GLP-1 injection but he declines wanting to try it at this time. He denies having hypoglycemia.     Medication change: Will have him increase Basaglar to 110 units (4 unit increase) to see if helps get fasting blood glucose to target and if rest of the day stays in target with current Novolog dose of 27 units at meals.  Will follow up in 2 weeks to assess and adjust insulin again as needed.  Basal/bolus after changes: 58%/42%    Today, finished AADE7's diabetes information on  heart-healthy eating, self-care,  risk reduction, problem solving, healthy coping and resources.  Patient encouraged to have eye exam since >2 years and currently wears glasses.  Was encouraged to have dental cleaning since has been >1 year and to start protecting and checking over feet daily.  Pt verbalized understanding of concepts discussed and recommendations provided.       Goals Addressed as of 7/10/2020 at 1:01 PM                 Today      Monitoring (pt-stated)   100%    Added 7/10/20 by Alise Pinzon RD     My Goal: I will check blood glucose 3x/day    What I need to meet my goal: na    I plan to meet my goal by this date: follow up              Patient's most recent   Lab Results   Component Value Date    A1C 11.3 06/17/2020      PLAN  See Patient Instructions for co-developed, patient-stated behavior change goals.  AVS printed and mailed to patient today. See Follow-Up section for recommended follow-up (7/24/20).    Alise Pinzon RDN, LD, [facility]   Time Spent: 35 minutes  Encounter Type: Individual TELEPHONE visit    Any diabetes medication dose changes were made via the CDE Protocol and Collaborative Practice Agreement with the patient's referring provider. A copy of this encounter was shared with the provider.

## 2020-07-10 NOTE — PATIENT INSTRUCTIONS
1. Increase Basaglar to 110 units in the morning (4 unit increase)    2. No change to Novolog - continue 27 units before each meal.    3. Make comprehensive diabetes eye exam (yearly) and dental exam (every 6 months).      4. Start to look over feet daily- want to watch for any cuts, sores or redness that does not appear to be healing.  Reach out to your doctor if any problems.  Wear hard soled shoes and slippers to protect feet from injury.    5. Helpful websites on diabetes: American Diabetes Association (diabetes.org) and JDRF specializes in Type 1 diabetes (www.jdrf.org)      FOLLOW UP: Will call to check on your blood glucose and make insulin adjustment if needed on Friday, July 24th around 2pm.    Alise Pinzon RDN, LD, [facility]   [phone]

## 2020-07-24 ENCOUNTER — PATIENT OUTREACH (OUTPATIENT)
Dept: EDUCATION SERVICES | Facility: CLINIC | Age: 20
End: 2020-07-24

## 2020-07-24 DIAGNOSIS — E08.10 DIABETIC KETOACIDOSIS WITHOUT COMA ASSOCIATED WITH DIABETES MELLITUS DUE TO UNDERLYING CONDITION (H): ICD-10-CM

## 2020-07-24 DIAGNOSIS — E10.65 TYPE 1 DIABETES MELLITUS WITH HYPERGLYCEMIA (H): ICD-10-CM

## 2020-07-24 PROCEDURE — 98966 PH1 ASSMT&MGMT NQHP 5-10: CPT

## 2020-07-24 RX ORDER — INSULIN GLARGINE 100 [IU]/ML
112 INJECTION, SOLUTION SUBCUTANEOUS DAILY
Qty: 36 ML | Refills: 11 | Status: ON HOLD | OUTPATIENT
Start: 2020-07-24 | End: 2024-03-09

## 2020-07-24 NOTE — PATIENT INSTRUCTIONS
1. Increase Novolog to 28 units before each meal (1 unit increase)    2. Increase Basaglar to 112 units (2 unit increase)    FOLLOW UP: will call you around 2pm on Friday, August 14th to review blood glucose values.    Alise Pinzon RDN, LD, [facility]   [phone]

## 2020-07-24 NOTE — PROGRESS NOTES
Diabetes Follow-up  Patient verbally consented to the telephone visit service today: yes     Subjective/Objective:    Mynor Rollins sent in blood glucose log for review. Last date of communication was: 7/10/20.    Diabetes is being managed with   Diabetes Medications   Diabetes Medication(s)     Insulin       insulin aspart (NOVOLOG PEN) 100 UNIT/ML pen    Inject 27 Units Subcutaneous 3 times daily (with meals) Plus the medium sliding scale.     insulin glargine (BASAGLAR KWIKPEN) 100 UNIT/ML pen    Inject 110 Units Subcutaneous daily          BG/Food Lo/24: 150, -  : 137, 142, 140  : 145, 142, 139  : 147, 140, 136  : 110, 120, 105  : 130, 127, 120  : 106, 111, 116      Assessment:    Fasting blood glucose: 43% in target.  Before lunch glucose: 50% in target.  Before dinner glucose: 50% in target.    Noticed past few days blood glucose has been running higher.  He denies any changes to food, no changes to activity and states he is feeling well.      Recommend to increase Novolog by 1 unit at meals, increasing from 27 to 28 units and to increase Basaglar by another 2 units, increasing to 112 unit at bedtime. Pt verbalized understanding of concepts discussed and recommendations provided.   Denies needing AVS of changes.  Feels comfortable lowering back down if blood glucose drop too much with changes. Pt verbalized understanding of concepts discussed and recommendations provided.         Plan/Response:  Increase Novolog to 28 units before each meal (1 unit increase)  Increase Basaglar to 112 units (2 unit increase)  This change today will provide 112 units basal and 84 units bolus (57%/43%).    Follow up: 20    Alise Pinzon RDN, RAFAEL, WILFRIDOES   Time: 9 minutes  Individual TELEPHONE visit    Any diabetes medication dose changes were made via the CDE Protocol and Collaborative Practice Agreement with the patient's referring provider. A copy of this encounter was shared with the  provider.

## 2020-08-14 ENCOUNTER — VIRTUAL VISIT (OUTPATIENT)
Dept: EDUCATION SERVICES | Facility: CLINIC | Age: 20
End: 2020-08-14

## 2020-08-14 DIAGNOSIS — E11.9 NEWLY DIAGNOSED DIABETES (H): Primary | ICD-10-CM

## 2020-08-14 PROCEDURE — 98967 PH1 ASSMT&MGMT NQHP 11-20: CPT

## 2020-08-14 NOTE — PATIENT INSTRUCTIONS
1. No insulin changes today.  Continue 112 units of Basaglar daily and 28 units of Novolog at meals.    2. Follow up with Dr. Sarabia in mid-September to follow up on blood glucose/A1C    FOLLOW UP: Call with any questions or concerns.    Alise Pinzon RDN, LD, [facility]   [phone]

## 2020-08-14 NOTE — Clinical Note
Hi!   Just FYI that Johan was seeing 100% blood glucose in target today and denies any hypoglycemia.  No changes to insulin doses were recommended today.  Johan declined additional follow up but was encouraged to schedule a follow up on blood glucose/A1c with Dr. Sarabia in mid-September.  Encouraged Johan to call with any questions or concerns.    Thanks,  Alise Pinzon RDN, LD, [facility][note 3]ES

## 2020-08-14 NOTE — PROGRESS NOTES
Diabetes Follow-up  Patient verbally consented to the telephone visit service today: yes     Subjective/Objective:    Mynor Rollins sent in blood glucose log for review. Last date of communication was: 20.    Diabetes is being managed with   Diabetes Medications   Diabetes Medication(s)     Insulin       insulin aspart (NOVOLOG PEN) 100 UNIT/ML pen    Inject 28 Units Subcutaneous 3 times daily (with meals) Plus the medium sliding scale.     insulin glargine (BASAGLAR KWIKPEN) 100 UNIT/ML pen    Inject 112 Units Subcutaneous daily          BG/Food Lo/31: 109, 110, 112  8/1: 100, 105, 107  8/2: 101, 99, 105  8/3: 115, 111, 116  8/4: 101, 103, 98  8: 97, 102, 104  86: 103, 101, 101  8: 111, 110, 112  8: 102, 105, 100  8: 103, 109, 107  8/10: 101, 103, 105  : 113, 111, 106  : 116, 114, 108  : 116, 106, 112  : -    Denies any low blood glucose.    Assessment:    Fasting blood glucose: 100% in target.  Before lunch glucose: 100% in target.  Before dinner glucose: 100% in target.    Patient seeing all blood glucose in target the past 2 weeks before meals and denies any hypoglycemia.  No changes to insulin recommended today.    Asked if blood glucose checking is going all right or if interested in a personal CGM to try to simplify his regimen and feels this is going ok and not interested in CGM at this time.  Denies any additional questions or concerns.  Declines follow up as he feels he has things under control and says he will reach out with any problems.  He was encouraged to follow up with endo in mid-September. Pt verbalized understanding of concepts discussed and recommendations provided.       Plan/Response:  No changes in the patient's current treatment plan  Follow up with Dr. Sarabia in September to check on blood glucose/A1C.  Patient declines additional follow up at this time and says will call with any questions or concerns.    Alise Pinzon,  TIFFANY, LD, [facility]   Time Spent:   11 minutes  Individual TELEPHONE visit    Any diabetes medication dose changes were made via the CDE Protocol and Collaborative Practice Agreement with the patient's referring provider. A copy of this encounter was shared with the provider.    Email to patient:    Marshal Prado,    Thanks for following up with me today.  You continue to do a great job and are seeing good blood glucose control- keep it going!    Here is a summary of what we discussed today:   1. No insulin changes today.  Continue 112 units of Basaglar daily and 28 units of Novolog at meals.    2. Follow up with Dr. Sarabia in mid-September to follow up on blood glucose/A1C    FOLLOW UP: Call with any questions or concerns.    Thanks,

## 2022-02-17 PROBLEM — E11.10 DKA (DIABETIC KETOACIDOSIS) (H): Status: ACTIVE | Noted: 2020-06-17

## 2023-11-24 ENCOUNTER — TRANSFERRED RECORDS (OUTPATIENT)
Dept: MULTI SPECIALTY CLINIC | Facility: CLINIC | Age: 23
End: 2023-11-24

## 2023-11-24 LAB — RETINOPATHY: NORMAL

## 2024-03-08 ENCOUNTER — HOSPITAL ENCOUNTER (INPATIENT)
Facility: CLINIC | Age: 24
LOS: 5 days | Discharge: ANOTHER HOSPITAL | End: 2024-03-13
Attending: EMERGENCY MEDICINE | Admitting: INTERNAL MEDICINE
Payer: MEDICAID

## 2024-03-08 ENCOUNTER — APPOINTMENT (OUTPATIENT)
Dept: CT IMAGING | Facility: CLINIC | Age: 24
End: 2024-03-08
Attending: EMERGENCY MEDICINE
Payer: MEDICAID

## 2024-03-08 DIAGNOSIS — K57.20 DIVERTICULITIS OF LARGE INTESTINE WITH ABSCESS WITHOUT BLEEDING: ICD-10-CM

## 2024-03-08 PROBLEM — E10.9 TYPE 1 DIABETES MELLITUS (H): Status: ACTIVE | Noted: 2024-03-08

## 2024-03-08 LAB
ALBUMIN SERPL BCG-MCNC: 4.6 G/DL (ref 3.5–5.2)
ALP SERPL-CCNC: 79 U/L (ref 40–150)
ALT SERPL W P-5'-P-CCNC: 81 U/L (ref 0–70)
ANION GAP SERPL CALCULATED.3IONS-SCNC: 16 MMOL/L (ref 7–15)
AST SERPL W P-5'-P-CCNC: 33 U/L (ref 0–45)
B-OH-BUTYR SERPL-SCNC: 1.6 MMOL/L
B-OH-BUTYR SERPL-SCNC: 2.1 MMOL/L
BASE EXCESS BLDV CALC-SCNC: -1.6 MMOL/L (ref -3–3)
BASOPHILS # BLD AUTO: 0.1 10E3/UL (ref 0–0.2)
BASOPHILS NFR BLD AUTO: 0 %
BILIRUB SERPL-MCNC: 0.8 MG/DL
BUN SERPL-MCNC: 7.4 MG/DL (ref 6–20)
CALCIUM SERPL-MCNC: 9.4 MG/DL (ref 8.6–10)
CHLORIDE SERPL-SCNC: 99 MMOL/L (ref 98–107)
CREAT SERPL-MCNC: 0.63 MG/DL (ref 0.67–1.17)
DEPRECATED HCO3 PLAS-SCNC: 21 MMOL/L (ref 22–29)
EGFRCR SERPLBLD CKD-EPI 2021: >90 ML/MIN/1.73M2
EOSINOPHIL # BLD AUTO: 0.1 10E3/UL (ref 0–0.7)
EOSINOPHIL NFR BLD AUTO: 1 %
ERYTHROCYTE [DISTWIDTH] IN BLOOD BY AUTOMATED COUNT: 11.6 % (ref 10–15)
GLUCOSE BLDC GLUCOMTR-MCNC: 183 MG/DL (ref 70–99)
GLUCOSE SERPL-MCNC: 206 MG/DL (ref 70–99)
HBA1C MFR BLD: 9 %
HCO3 BLDV-SCNC: 23 MMOL/L (ref 21–28)
HCT VFR BLD AUTO: 46.1 % (ref 40–53)
HGB BLD-MCNC: 15.7 G/DL (ref 13.3–17.7)
IMM GRANULOCYTES # BLD: 0.1 10E3/UL
IMM GRANULOCYTES NFR BLD: 1 %
LIPASE SERPL-CCNC: 23 U/L (ref 13–60)
LYMPHOCYTES # BLD AUTO: 1.6 10E3/UL (ref 0–5.3)
LYMPHOCYTES NFR BLD AUTO: 11 %
MAGNESIUM SERPL-MCNC: 1.7 MG/DL (ref 1.7–2.3)
MCH RBC QN AUTO: 28.5 PG (ref 26.5–33)
MCHC RBC AUTO-ENTMCNC: 34.1 G/DL (ref 31.5–36.5)
MCV RBC AUTO: 84 FL (ref 78–100)
MONOCYTES # BLD AUTO: 1.2 10E3/UL (ref 0–1.3)
MONOCYTES NFR BLD AUTO: 8 %
NEUTROPHILS # BLD AUTO: 11.8 10E3/UL (ref 1.6–8.3)
NEUTROPHILS NFR BLD AUTO: 80 %
NRBC # BLD AUTO: 0 10E3/UL
NRBC BLD AUTO-RTO: 0 /100
O2/TOTAL GAS SETTING VFR VENT: 21 %
OXYHGB MFR BLDV: 88 % (ref 70–75)
PCO2 BLDV: 38 MM HG (ref 40–50)
PH BLDV: 7.39 [PH] (ref 7.32–7.43)
PHOSPHATE SERPL-MCNC: 3.1 MG/DL (ref 2.5–4.5)
PLATELET # BLD AUTO: 340 10E3/UL (ref 150–450)
PO2 BLDV: 54 MM HG (ref 25–47)
POTASSIUM SERPL-SCNC: 3.5 MMOL/L (ref 3.4–5.3)
POTASSIUM SERPL-SCNC: 3.7 MMOL/L (ref 3.4–5.3)
PROT SERPL-MCNC: 8.1 G/DL (ref 6.4–8.3)
RBC # BLD AUTO: 5.51 10E6/UL (ref 4.4–5.9)
SAO2 % BLDV: 89.3 % (ref 70–75)
SODIUM SERPL-SCNC: 136 MMOL/L (ref 135–145)
WBC # BLD AUTO: 14.7 10E3/UL (ref 4–11)

## 2024-03-08 PROCEDURE — 250N000013 HC RX MED GY IP 250 OP 250 PS 637

## 2024-03-08 PROCEDURE — 80053 COMPREHEN METABOLIC PANEL: CPT | Performed by: EMERGENCY MEDICINE

## 2024-03-08 PROCEDURE — 74177 CT ABD & PELVIS W/CONTRAST: CPT

## 2024-03-08 PROCEDURE — 96375 TX/PRO/DX INJ NEW DRUG ADDON: CPT | Performed by: EMERGENCY MEDICINE

## 2024-03-08 PROCEDURE — 258N000003 HC RX IP 258 OP 636

## 2024-03-08 PROCEDURE — 96361 HYDRATE IV INFUSION ADD-ON: CPT | Performed by: EMERGENCY MEDICINE

## 2024-03-08 PROCEDURE — 250N000011 HC RX IP 250 OP 636: Performed by: EMERGENCY MEDICINE

## 2024-03-08 PROCEDURE — 82010 KETONE BODYS QUAN: CPT

## 2024-03-08 PROCEDURE — 85018 HEMOGLOBIN: CPT | Performed by: EMERGENCY MEDICINE

## 2024-03-08 PROCEDURE — 120N000001 HC R&B MED SURG/OB

## 2024-03-08 PROCEDURE — 250N000012 HC RX MED GY IP 250 OP 636 PS 637

## 2024-03-08 PROCEDURE — 82010 KETONE BODYS QUAN: CPT | Performed by: EMERGENCY MEDICINE

## 2024-03-08 PROCEDURE — 36415 COLL VENOUS BLD VENIPUNCTURE: CPT

## 2024-03-08 PROCEDURE — 84100 ASSAY OF PHOSPHORUS: CPT

## 2024-03-08 PROCEDURE — 85025 COMPLETE CBC W/AUTO DIFF WBC: CPT | Performed by: EMERGENCY MEDICINE

## 2024-03-08 PROCEDURE — 96374 THER/PROPH/DIAG INJ IV PUSH: CPT | Mod: 59 | Performed by: EMERGENCY MEDICINE

## 2024-03-08 PROCEDURE — 250N000009 HC RX 250: Performed by: EMERGENCY MEDICINE

## 2024-03-08 PROCEDURE — 82805 BLOOD GASES W/O2 SATURATION: CPT | Performed by: EMERGENCY MEDICINE

## 2024-03-08 PROCEDURE — 99222 1ST HOSP IP/OBS MODERATE 55: CPT

## 2024-03-08 PROCEDURE — 83036 HEMOGLOBIN GLYCOSYLATED A1C: CPT

## 2024-03-08 PROCEDURE — 83735 ASSAY OF MAGNESIUM: CPT

## 2024-03-08 PROCEDURE — 99285 EMERGENCY DEPT VISIT HI MDM: CPT | Mod: 25 | Performed by: EMERGENCY MEDICINE

## 2024-03-08 PROCEDURE — 84132 ASSAY OF SERUM POTASSIUM: CPT

## 2024-03-08 PROCEDURE — 83690 ASSAY OF LIPASE: CPT | Performed by: EMERGENCY MEDICINE

## 2024-03-08 PROCEDURE — 258N000003 HC RX IP 258 OP 636: Performed by: EMERGENCY MEDICINE

## 2024-03-08 PROCEDURE — 36415 COLL VENOUS BLD VENIPUNCTURE: CPT | Performed by: EMERGENCY MEDICINE

## 2024-03-08 PROCEDURE — 99285 EMERGENCY DEPT VISIT HI MDM: CPT | Performed by: EMERGENCY MEDICINE

## 2024-03-08 RX ORDER — NALOXONE HYDROCHLORIDE 0.4 MG/ML
0.2 INJECTION, SOLUTION INTRAMUSCULAR; INTRAVENOUS; SUBCUTANEOUS
Status: DISCONTINUED | OUTPATIENT
Start: 2024-03-08 | End: 2024-03-13 | Stop reason: HOSPADM

## 2024-03-08 RX ORDER — SODIUM CHLORIDE, SODIUM LACTATE, POTASSIUM CHLORIDE, CALCIUM CHLORIDE 600; 310; 30; 20 MG/100ML; MG/100ML; MG/100ML; MG/100ML
INJECTION, SOLUTION INTRAVENOUS CONTINUOUS
Status: DISCONTINUED | OUTPATIENT
Start: 2024-03-08 | End: 2024-03-10

## 2024-03-08 RX ORDER — CEFEPIME HYDROCHLORIDE 2 G/1
2 INJECTION, POWDER, FOR SOLUTION INTRAVENOUS ONCE
Status: COMPLETED | OUTPATIENT
Start: 2024-03-08 | End: 2024-03-08

## 2024-03-08 RX ORDER — ONDANSETRON 2 MG/ML
4 INJECTION INTRAMUSCULAR; INTRAVENOUS EVERY 6 HOURS PRN
Status: DISCONTINUED | OUTPATIENT
Start: 2024-03-08 | End: 2024-03-13 | Stop reason: HOSPADM

## 2024-03-08 RX ORDER — METRONIDAZOLE 500 MG/100ML
500 INJECTION, SOLUTION INTRAVENOUS EVERY 8 HOURS
Status: DISCONTINUED | OUTPATIENT
Start: 2024-03-09 | End: 2024-03-13 | Stop reason: HOSPADM

## 2024-03-08 RX ORDER — METRONIDAZOLE 500 MG/100ML
500 INJECTION, SOLUTION INTRAVENOUS ONCE
Status: COMPLETED | OUTPATIENT
Start: 2024-03-08 | End: 2024-03-08

## 2024-03-08 RX ORDER — AMOXICILLIN 250 MG
1 CAPSULE ORAL 2 TIMES DAILY PRN
Status: DISCONTINUED | OUTPATIENT
Start: 2024-03-08 | End: 2024-03-13 | Stop reason: HOSPADM

## 2024-03-08 RX ORDER — SEMAGLUTIDE 1.34 MG/ML
1 INJECTION, SOLUTION SUBCUTANEOUS
COMMUNITY
Start: 2023-07-24 | End: 2024-05-02

## 2024-03-08 RX ORDER — DEXTROSE MONOHYDRATE, SODIUM CHLORIDE, AND POTASSIUM CHLORIDE 50; 1.49; 4.5 G/1000ML; G/1000ML; G/1000ML
INJECTION, SOLUTION INTRAVENOUS CONTINUOUS
Status: DISCONTINUED | OUTPATIENT
Start: 2024-03-08 | End: 2024-03-08

## 2024-03-08 RX ORDER — CALCIUM CARBONATE 500 MG/1
1000 TABLET, CHEWABLE ORAL 4 TIMES DAILY PRN
Status: DISCONTINUED | OUTPATIENT
Start: 2024-03-08 | End: 2024-03-13 | Stop reason: HOSPADM

## 2024-03-08 RX ORDER — CEFEPIME HYDROCHLORIDE 2 G/1
2 INJECTION, POWDER, FOR SOLUTION INTRAVENOUS EVERY 8 HOURS
Status: DISCONTINUED | OUTPATIENT
Start: 2024-03-09 | End: 2024-03-13 | Stop reason: HOSPADM

## 2024-03-08 RX ORDER — ONDANSETRON 4 MG/1
4 TABLET, ORALLY DISINTEGRATING ORAL EVERY 6 HOURS PRN
Status: DISCONTINUED | OUTPATIENT
Start: 2024-03-08 | End: 2024-03-13 | Stop reason: HOSPADM

## 2024-03-08 RX ORDER — ONDANSETRON 2 MG/ML
4 INJECTION INTRAMUSCULAR; INTRAVENOUS ONCE
Status: COMPLETED | OUTPATIENT
Start: 2024-03-08 | End: 2024-03-08

## 2024-03-08 RX ORDER — OXYCODONE HYDROCHLORIDE 5 MG/1
5 TABLET ORAL EVERY 4 HOURS PRN
Status: DISCONTINUED | OUTPATIENT
Start: 2024-03-08 | End: 2024-03-09

## 2024-03-08 RX ORDER — HYDROMORPHONE HYDROCHLORIDE 1 MG/ML
.3-.5 INJECTION, SOLUTION INTRAMUSCULAR; INTRAVENOUS; SUBCUTANEOUS
Status: DISCONTINUED | OUTPATIENT
Start: 2024-03-08 | End: 2024-03-09

## 2024-03-08 RX ORDER — CALCIUM CARBONATE 500 MG/1
1000 TABLET, CHEWABLE ORAL 4 TIMES DAILY PRN
Status: DISCONTINUED | OUTPATIENT
Start: 2024-03-08 | End: 2024-03-08

## 2024-03-08 RX ORDER — HYDROMORPHONE HYDROCHLORIDE 1 MG/ML
0.5 INJECTION, SOLUTION INTRAMUSCULAR; INTRAVENOUS; SUBCUTANEOUS ONCE
Status: COMPLETED | OUTPATIENT
Start: 2024-03-08 | End: 2024-03-08

## 2024-03-08 RX ORDER — LIDOCAINE 40 MG/G
CREAM TOPICAL
Status: DISCONTINUED | OUTPATIENT
Start: 2024-03-08 | End: 2024-03-08

## 2024-03-08 RX ORDER — LIDOCAINE 40 MG/G
CREAM TOPICAL
Status: DISCONTINUED | OUTPATIENT
Start: 2024-03-08 | End: 2024-03-13 | Stop reason: HOSPADM

## 2024-03-08 RX ORDER — DEXTROSE MONOHYDRATE 25 G/50ML
25-50 INJECTION, SOLUTION INTRAVENOUS
Status: DISCONTINUED | OUTPATIENT
Start: 2024-03-08 | End: 2024-03-09

## 2024-03-08 RX ORDER — ONDANSETRON 4 MG/1
4 TABLET, ORALLY DISINTEGRATING ORAL EVERY 6 HOURS PRN
Status: DISCONTINUED | OUTPATIENT
Start: 2024-03-08 | End: 2024-03-08

## 2024-03-08 RX ORDER — NALOXONE HYDROCHLORIDE 0.4 MG/ML
0.4 INJECTION, SOLUTION INTRAMUSCULAR; INTRAVENOUS; SUBCUTANEOUS
Status: DISCONTINUED | OUTPATIENT
Start: 2024-03-08 | End: 2024-03-13 | Stop reason: HOSPADM

## 2024-03-08 RX ORDER — IOPAMIDOL 755 MG/ML
100 INJECTION, SOLUTION INTRAVASCULAR ONCE
Status: COMPLETED | OUTPATIENT
Start: 2024-03-08 | End: 2024-03-08

## 2024-03-08 RX ORDER — NICOTINE POLACRILEX 4 MG
15-30 LOZENGE BUCCAL
Status: DISCONTINUED | OUTPATIENT
Start: 2024-03-08 | End: 2024-03-09

## 2024-03-08 RX ORDER — AMOXICILLIN 250 MG
2 CAPSULE ORAL 2 TIMES DAILY PRN
Status: DISCONTINUED | OUTPATIENT
Start: 2024-03-08 | End: 2024-03-13 | Stop reason: HOSPADM

## 2024-03-08 RX ORDER — ONDANSETRON 2 MG/ML
4 INJECTION INTRAMUSCULAR; INTRAVENOUS EVERY 6 HOURS PRN
Status: DISCONTINUED | OUTPATIENT
Start: 2024-03-08 | End: 2024-03-08

## 2024-03-08 RX ORDER — PROCHLORPERAZINE 25 MG
25 SUPPOSITORY, RECTAL RECTAL EVERY 12 HOURS PRN
Status: DISCONTINUED | OUTPATIENT
Start: 2024-03-08 | End: 2024-03-13 | Stop reason: HOSPADM

## 2024-03-08 RX ORDER — PROCHLORPERAZINE MALEATE 10 MG
10 TABLET ORAL EVERY 6 HOURS PRN
Status: DISCONTINUED | OUTPATIENT
Start: 2024-03-08 | End: 2024-03-13 | Stop reason: HOSPADM

## 2024-03-08 RX ORDER — ACETAMINOPHEN 325 MG/1
650 TABLET ORAL EVERY 4 HOURS PRN
Status: DISCONTINUED | OUTPATIENT
Start: 2024-03-08 | End: 2024-03-13 | Stop reason: HOSPADM

## 2024-03-08 RX ADMIN — CEFEPIME 2 G: 2 INJECTION, POWDER, FOR SOLUTION INTRAVENOUS at 21:02

## 2024-03-08 RX ADMIN — SODIUM CHLORIDE, POTASSIUM CHLORIDE, SODIUM LACTATE AND CALCIUM CHLORIDE: 600; 310; 30; 20 INJECTION, SOLUTION INTRAVENOUS at 23:50

## 2024-03-08 RX ADMIN — IOPAMIDOL 100 ML: 755 INJECTION, SOLUTION INTRAVENOUS at 17:38

## 2024-03-08 RX ADMIN — OXYCODONE HYDROCHLORIDE 2.5 MG: 5 TABLET ORAL at 22:07

## 2024-03-08 RX ADMIN — ACETAMINOPHEN 650 MG: 325 TABLET, FILM COATED ORAL at 22:07

## 2024-03-08 RX ADMIN — INSULIN ASPART 1 UNITS: 100 INJECTION, SOLUTION INTRAVENOUS; SUBCUTANEOUS at 22:43

## 2024-03-08 RX ADMIN — SODIUM CHLORIDE, POTASSIUM CHLORIDE, SODIUM LACTATE AND CALCIUM CHLORIDE 1000 ML: 600; 310; 30; 20 INJECTION, SOLUTION INTRAVENOUS at 17:24

## 2024-03-08 RX ADMIN — ONDANSETRON 4 MG: 2 INJECTION INTRAMUSCULAR; INTRAVENOUS at 17:23

## 2024-03-08 RX ADMIN — METRONIDAZOLE 500 MG: 500 INJECTION, SOLUTION INTRAVENOUS at 22:07

## 2024-03-08 RX ADMIN — HYDROMORPHONE HYDROCHLORIDE 0.5 MG: 1 INJECTION, SOLUTION INTRAMUSCULAR; INTRAVENOUS; SUBCUTANEOUS at 17:23

## 2024-03-08 RX ADMIN — SODIUM CHLORIDE 1000 ML: 9 INJECTION, SOLUTION INTRAVENOUS at 21:01

## 2024-03-08 RX ADMIN — SODIUM CHLORIDE 60 ML: 9 INJECTION, SOLUTION INTRAVENOUS at 17:38

## 2024-03-08 ASSESSMENT — COLUMBIA-SUICIDE SEVERITY RATING SCALE - C-SSRS
2. HAVE YOU ACTUALLY HAD ANY THOUGHTS OF KILLING YOURSELF IN THE PAST MONTH?: NO
1. IN THE PAST MONTH, HAVE YOU WISHED YOU WERE DEAD OR WISHED YOU COULD GO TO SLEEP AND NOT WAKE UP?: NO
6. HAVE YOU EVER DONE ANYTHING, STARTED TO DO ANYTHING, OR PREPARED TO DO ANYTHING TO END YOUR LIFE?: NO

## 2024-03-08 ASSESSMENT — ACTIVITIES OF DAILY LIVING (ADL)
ADLS_ACUITY_SCORE: 35
ADLS_ACUITY_SCORE: 18
ADLS_ACUITY_SCORE: 35
ADLS_ACUITY_SCORE: 18
ADLS_ACUITY_SCORE: 35

## 2024-03-08 NOTE — ED TRIAGE NOTES
Left sided abdominal pain started yesterday, also having chills, vomiting and diarrhea     Triage Assessment (Adult)       Row Name 03/08/24 4240          Triage Assessment    Airway WDL WDL        Respiratory WDL    Respiratory WDL WDL        Skin Circulation/Temperature WDL    Skin Circulation/Temperature WDL WDL        Cardiac WDL    Cardiac WDL WDL        Peripheral/Neurovascular WDL    Peripheral Neurovascular WDL WDL        Cognitive/Neuro/Behavioral WDL    Cognitive/Neuro/Behavioral WDL WDL

## 2024-03-09 LAB
ALBUMIN SERPL BCG-MCNC: 4.1 G/DL (ref 3.5–5.2)
ALP SERPL-CCNC: 70 U/L (ref 40–150)
ALT SERPL W P-5'-P-CCNC: 59 U/L (ref 0–70)
ANION GAP SERPL CALCULATED.3IONS-SCNC: 14 MMOL/L (ref 7–15)
AST SERPL W P-5'-P-CCNC: 21 U/L (ref 0–45)
B-OH-BUTYR SERPL-SCNC: 1.9 MMOL/L
BILIRUB SERPL-MCNC: 0.9 MG/DL
BUN SERPL-MCNC: 5.1 MG/DL (ref 6–20)
CALCIUM SERPL-MCNC: 8.9 MG/DL (ref 8.6–10)
CHLORIDE SERPL-SCNC: 102 MMOL/L (ref 98–107)
CREAT SERPL-MCNC: 0.7 MG/DL (ref 0.67–1.17)
DEPRECATED HCO3 PLAS-SCNC: 23 MMOL/L (ref 22–29)
EGFRCR SERPLBLD CKD-EPI 2021: >90 ML/MIN/1.73M2
ERYTHROCYTE [DISTWIDTH] IN BLOOD BY AUTOMATED COUNT: 11.7 % (ref 10–15)
GLUCOSE BLDC GLUCOMTR-MCNC: 168 MG/DL (ref 70–99)
GLUCOSE BLDC GLUCOMTR-MCNC: 171 MG/DL (ref 70–99)
GLUCOSE BLDC GLUCOMTR-MCNC: 183 MG/DL (ref 70–99)
GLUCOSE BLDC GLUCOMTR-MCNC: 185 MG/DL (ref 70–99)
GLUCOSE BLDC GLUCOMTR-MCNC: 194 MG/DL (ref 70–99)
GLUCOSE SERPL-MCNC: 199 MG/DL (ref 70–99)
HCT VFR BLD AUTO: 41.6 % (ref 40–53)
HGB BLD-MCNC: 13.5 G/DL (ref 13.3–17.7)
MCH RBC QN AUTO: 28.1 PG (ref 26.5–33)
MCHC RBC AUTO-ENTMCNC: 32.5 G/DL (ref 31.5–36.5)
MCV RBC AUTO: 87 FL (ref 78–100)
PLATELET # BLD AUTO: 291 10E3/UL (ref 150–450)
POTASSIUM SERPL-SCNC: 4.1 MMOL/L (ref 3.4–5.3)
PROT SERPL-MCNC: 7 G/DL (ref 6.4–8.3)
RBC # BLD AUTO: 4.81 10E6/UL (ref 4.4–5.9)
SODIUM SERPL-SCNC: 139 MMOL/L (ref 135–145)
WBC # BLD AUTO: 13.5 10E3/UL (ref 4–11)

## 2024-03-09 PROCEDURE — 120N000001 HC R&B MED SURG/OB

## 2024-03-09 PROCEDURE — 250N000013 HC RX MED GY IP 250 OP 250 PS 637: Performed by: STUDENT IN AN ORGANIZED HEALTH CARE EDUCATION/TRAINING PROGRAM

## 2024-03-09 PROCEDURE — 250N000011 HC RX IP 250 OP 636: Performed by: EMERGENCY MEDICINE

## 2024-03-09 PROCEDURE — 82010 KETONE BODYS QUAN: CPT

## 2024-03-09 PROCEDURE — 250N000011 HC RX IP 250 OP 636: Mod: JZ

## 2024-03-09 PROCEDURE — 250N000011 HC RX IP 250 OP 636: Performed by: STUDENT IN AN ORGANIZED HEALTH CARE EDUCATION/TRAINING PROGRAM

## 2024-03-09 PROCEDURE — 85027 COMPLETE CBC AUTOMATED: CPT

## 2024-03-09 PROCEDURE — 250N000013 HC RX MED GY IP 250 OP 250 PS 637

## 2024-03-09 PROCEDURE — 99222 1ST HOSP IP/OBS MODERATE 55: CPT | Performed by: SURGERY

## 2024-03-09 PROCEDURE — 258N000003 HC RX IP 258 OP 636

## 2024-03-09 PROCEDURE — 99233 SBSQ HOSP IP/OBS HIGH 50: CPT | Performed by: STUDENT IN AN ORGANIZED HEALTH CARE EDUCATION/TRAINING PROGRAM

## 2024-03-09 PROCEDURE — 80053 COMPREHEN METABOLIC PANEL: CPT

## 2024-03-09 PROCEDURE — 36415 COLL VENOUS BLD VENIPUNCTURE: CPT

## 2024-03-09 RX ORDER — DEXTROSE MONOHYDRATE 25 G/50ML
25-50 INJECTION, SOLUTION INTRAVENOUS
Status: DISCONTINUED | OUTPATIENT
Start: 2024-03-09 | End: 2024-03-09

## 2024-03-09 RX ORDER — OXYCODONE HYDROCHLORIDE 5 MG/1
10 TABLET ORAL EVERY 4 HOURS PRN
Status: DISCONTINUED | OUTPATIENT
Start: 2024-03-09 | End: 2024-03-09

## 2024-03-09 RX ORDER — KETOROLAC TROMETHAMINE 30 MG/ML
30 INJECTION, SOLUTION INTRAMUSCULAR; INTRAVENOUS EVERY 6 HOURS PRN
Status: DISCONTINUED | OUTPATIENT
Start: 2024-03-09 | End: 2024-03-13 | Stop reason: HOSPADM

## 2024-03-09 RX ORDER — HYDROMORPHONE HYDROCHLORIDE 1 MG/ML
.5-1 INJECTION, SOLUTION INTRAMUSCULAR; INTRAVENOUS; SUBCUTANEOUS
Status: DISCONTINUED | OUTPATIENT
Start: 2024-03-09 | End: 2024-03-13 | Stop reason: HOSPADM

## 2024-03-09 RX ORDER — NICOTINE POLACRILEX 4 MG
15-30 LOZENGE BUCCAL
Status: DISCONTINUED | OUTPATIENT
Start: 2024-03-09 | End: 2024-03-09

## 2024-03-09 RX ORDER — DEXTROSE MONOHYDRATE 25 G/50ML
25-50 INJECTION, SOLUTION INTRAVENOUS
Status: DISCONTINUED | OUTPATIENT
Start: 2024-03-09 | End: 2024-03-13 | Stop reason: HOSPADM

## 2024-03-09 RX ORDER — OXYCODONE HYDROCHLORIDE 5 MG/1
10 TABLET ORAL EVERY 4 HOURS PRN
Status: DISCONTINUED | OUTPATIENT
Start: 2024-03-09 | End: 2024-03-13 | Stop reason: HOSPADM

## 2024-03-09 RX ORDER — NICOTINE POLACRILEX 4 MG
15-30 LOZENGE BUCCAL
Status: DISCONTINUED | OUTPATIENT
Start: 2024-03-09 | End: 2024-03-13 | Stop reason: HOSPADM

## 2024-03-09 RX ADMIN — METRONIDAZOLE 500 MG: 500 INJECTION, SOLUTION INTRAVENOUS at 22:13

## 2024-03-09 RX ADMIN — HYDROMORPHONE HYDROCHLORIDE 0.5 MG: 1 INJECTION, SOLUTION INTRAMUSCULAR; INTRAVENOUS; SUBCUTANEOUS at 01:42

## 2024-03-09 RX ADMIN — ACETAMINOPHEN 650 MG: 325 TABLET, FILM COATED ORAL at 03:54

## 2024-03-09 RX ADMIN — OXYCODONE HYDROCHLORIDE 2.5 MG: 5 TABLET ORAL at 03:54

## 2024-03-09 RX ADMIN — SODIUM CHLORIDE, POTASSIUM CHLORIDE, SODIUM LACTATE AND CALCIUM CHLORIDE: 600; 310; 30; 20 INJECTION, SOLUTION INTRAVENOUS at 17:40

## 2024-03-09 RX ADMIN — CEFEPIME 2 G: 2 INJECTION, POWDER, FOR SOLUTION INTRAVENOUS at 21:35

## 2024-03-09 RX ADMIN — INSULIN ASPART 1 UNITS: 100 INJECTION, SOLUTION INTRAVENOUS; SUBCUTANEOUS at 01:42

## 2024-03-09 RX ADMIN — KETOROLAC TROMETHAMINE 30 MG: 30 INJECTION, SOLUTION INTRAMUSCULAR at 19:37

## 2024-03-09 RX ADMIN — CEFEPIME 2 G: 2 INJECTION, POWDER, FOR SOLUTION INTRAVENOUS at 12:50

## 2024-03-09 RX ADMIN — OXYCODONE HYDROCHLORIDE 10 MG: 5 TABLET ORAL at 13:32

## 2024-03-09 RX ADMIN — INSULIN ASPART 1 UNITS: 100 INJECTION, SOLUTION INTRAVENOUS; SUBCUTANEOUS at 08:23

## 2024-03-09 RX ADMIN — HYDROMORPHONE HYDROCHLORIDE 0.3 MG: 1 INJECTION, SOLUTION INTRAMUSCULAR; INTRAVENOUS; SUBCUTANEOUS at 15:17

## 2024-03-09 RX ADMIN — INSULIN ASPART 2 UNITS: 100 INJECTION, SOLUTION INTRAVENOUS; SUBCUTANEOUS at 06:35

## 2024-03-09 RX ADMIN — HYDROMORPHONE HYDROCHLORIDE 0.5 MG: 1 INJECTION, SOLUTION INTRAMUSCULAR; INTRAVENOUS; SUBCUTANEOUS at 10:59

## 2024-03-09 RX ADMIN — OXYCODONE HYDROCHLORIDE 2.5 MG: 5 TABLET ORAL at 10:02

## 2024-03-09 RX ADMIN — OXYCODONE HYDROCHLORIDE 2.5 MG: 5 TABLET ORAL at 09:29

## 2024-03-09 RX ADMIN — HYDROMORPHONE HYDROCHLORIDE 0.5 MG: 1 INJECTION, SOLUTION INTRAMUSCULAR; INTRAVENOUS; SUBCUTANEOUS at 04:47

## 2024-03-09 RX ADMIN — METRONIDAZOLE 500 MG: 500 INJECTION, SOLUTION INTRAVENOUS at 13:30

## 2024-03-09 RX ADMIN — SODIUM CHLORIDE, POTASSIUM CHLORIDE, SODIUM LACTATE AND CALCIUM CHLORIDE: 600; 310; 30; 20 INJECTION, SOLUTION INTRAVENOUS at 09:30

## 2024-03-09 RX ADMIN — CEFEPIME 2 G: 2 INJECTION, POWDER, FOR SOLUTION INTRAVENOUS at 04:51

## 2024-03-09 RX ADMIN — METRONIDAZOLE 500 MG: 500 INJECTION, SOLUTION INTRAVENOUS at 06:31

## 2024-03-09 ASSESSMENT — ACTIVITIES OF DAILY LIVING (ADL)
ADLS_ACUITY_SCORE: 18
ADLS_ACUITY_SCORE: 19
ADLS_ACUITY_SCORE: 18
ADLS_ACUITY_SCORE: 19
ADLS_ACUITY_SCORE: 18
ADLS_ACUITY_SCORE: 19
ADLS_ACUITY_SCORE: 18
ADLS_ACUITY_SCORE: 19
ADLS_ACUITY_SCORE: 18
ADLS_ACUITY_SCORE: 19
ADLS_ACUITY_SCORE: 18

## 2024-03-09 NOTE — ED NOTES
"[facility]   Admission Handoff    The patient is Mynor Rollins, 23 year old who arrived in the ED by CAR from home with a complaint of Abdominal Pain  . The patient's current symptoms are new and during this time the symptoms have decreased. In the ED, patient was diagnosed with   Final diagnoses:   Diverticulitis of large intestine with abscess without bleeding         Needed?: No    Allergies:  No Known Allergies    Past Medical Hx: No past medical history on file.    Initial vitals were: BP: (!) 135/92  Pulse: (!) 126  Temp: 100  F (37.8  C)  Resp: 18  Height: 170.2 cm (5' 7\")  Weight: 138.3 kg (305 lb) (stated)  SpO2: 96 %   Recent vital Signs: /79   Pulse 113   Temp 100  F (37.8  C) (Tympanic)   Resp 18   Ht 1.702 m (5' 7\")   Wt 138.3 kg (305 lb)   SpO2 98%   BMI 47.77 kg/m      Elimination Status: Continent: Yes     Activity Level: Independent    Fall Status: none  nonskid shoes/slippers when out of bed, patient and family education, and assistive device/personal items within reach    Baseline Mental status: WDL  Current Mental Status changes: at basesline    Infection present or suspected this encounter: yes other intra abdominal abscess  Sepsis suspected: No    Isolation type: none    Bariatric equipment needed?: No    In the ED these meds were given:   Medications   ceFEPIme (MAXIPIME) 2 g vial to attach to  mL bag for ADULTS or 50 mL bag for PEDS (has no administration in time range)   metroNIDAZOLE (FLAGYL) infusion 500 mg (has no administration in time range)   HYDROmorphone (PF) (DILAUDID) injection 0.5 mg (0.5 mg Intravenous $Given 3/8/24 1723)   ondansetron (ZOFRAN) injection 4 mg (4 mg Intravenous $Given 3/8/24 1723)   lactated ringers BOLUS 1,000 mL (1,000 mLs Intravenous $New Bag 3/8/24 1724)   iopamidol (ISOVUE-370) solution 100 mL (100 mLs Intravenous $Given 3/8/24 1738)   sodium chloride 0.9 % bag 500mL for CT scan flush use (60 mLs As " instructed $Given 3/8/24 7956)       Drips running?  Yes    Home pump  No    Current LDAs: Peripheral IV: Site right arm; Gauge 20  none     Results:   Labs/Imaging  Ordered and Resulted from Time of ED Arrival Up to the Time of Departure from the ED  Results for orders placed or performed during the hospital encounter of 03/08/24 (from the past 24 hour(s))   CBC with platelets differential    Narrative    The following orders were created for panel order CBC with platelets differential.  Procedure                               Abnormality         Status                     ---------                               -----------         ------                     CBC with platelets and d...[[id number]]  Abnormal            Final result                 Please view results for these tests on the individual orders.   Comprehensive metabolic panel   Result Value Ref Range    Sodium 136 135 - 145 mmol/L    Potassium 3.5 3.4 - 5.3 mmol/L    Carbon Dioxide (CO2) 21 (L) 22 - 29 mmol/L    Anion Gap 16 (H) 7 - 15 mmol/L    Urea Nitrogen 7.4 6.0 - 20.0 mg/dL    Creatinine 0.63 (L) 0.67 - 1.17 mg/dL    GFR Estimate >90 >60 mL/min/1.73m2    Calcium 9.4 8.6 - 10.0 mg/dL    Chloride 99 98 - 107 mmol/L    Glucose 206 (H) 70 - 99 mg/dL    Alkaline Phosphatase 79 40 - 150 U/L    AST 33 0 - 45 U/L    ALT 81 (H) 0 - 70 U/L    Protein Total 8.1 6.4 - 8.3 g/dL    Albumin 4.6 3.5 - 5.2 g/dL    Bilirubin Total 0.8 <=1.2 mg/dL   Lipase   Result Value Ref Range    Lipase 23 13 - 60 U/L   CBC with platelets and differential   Result Value Ref Range    WBC Count 14.7 (H) 4.0 - 11.0 10e3/uL    RBC Count 5.51 4.40 - 5.90 10e6/uL    Hemoglobin 15.7 13.3 - 17.7 g/dL    Hematocrit 46.1 40.0 - 53.0 %    MCV 84 78 - 100 fL    MCH 28.5 26.5 - 33.0 pg    MCHC 34.1 31.5 - 36.5 g/dL    RDW 11.6 10.0 - 15.0 %    Platelet Count 340 150 - 450 10e3/uL    % Neutrophils 80 %    % Lymphocytes 11 %    % Monocytes 8 %    % Eosinophils 1 %    % Basophils 0 %    %  Immature Granulocytes 1 %    NRBCs per 100 WBC 0 <1 /100    Absolute Neutrophils 11.8 (H) 1.6 - 8.3 10e3/uL    Absolute Lymphocytes 1.6 0.0 - 5.3 10e3/uL    Absolute Monocytes 1.2 0.0 - 1.3 10e3/uL    Absolute Eosinophils 0.1 0.0 - 0.7 10e3/uL    Absolute Basophils 0.1 0.0 - 0.2 10e3/uL    Absolute Immature Granulocytes 0.1 <=0.4 10e3/uL    Absolute NRBCs 0.0 10e3/uL   CT Abdomen Pelvis w Contrast    Narrative    EXAM: CT ABDOMEN PELVIS W CONTRAST  LOCATION: [facility]  DATE: 3/8/2024    INDICATION: Left lower quadrant pain, vomiting and diarrhea  COMPARISON: None.  TECHNIQUE: CT scan of the abdomen and pelvis was performed following injection of IV contrast. Multiplanar reformats were obtained. Dose reduction techniques were used.  CONTRAST: 100mL Isovue 370    FINDINGS:   LOWER CHEST: Lung bases are clear. Small hiatal hernia.    HEPATOBILIARY: Enlarged with moderate diffuse steatosis. No calcified gallstones or bile duct dilatation.    PANCREAS: Normal.    SPLEEN: Normal.    ADRENAL GLANDS: Normal.    KIDNEYS/BLADDER: Normal.    BOWEL: 3.4 x 3.0 cm extraluminal air/fluid collection left lower quadrant abutting the descending/sigmoid colon junction. Mildly thickened/irregular wall with mild surrounding soft tissue stranding/inflammation. Appearance consistent with abscess. Favor   diverticular abscess although the colon is collapsed without visible diverticula or visible intrinsic wall inflammation/thickening, which is atypical. No bowel obstruction, free fluid or free air.    Normal appendix. Stomach and small bowel unremarkable.    LYMPH NODES: Normal.    VASCULATURE: Unremarkable.    PELVIC ORGANS: No pelvic masses.    MUSCULOSKELETAL: Unremarkable.      Impression    IMPRESSION:   1.  3.4 cm extraluminal air/fluid collection left lower quadrant abutting the colon consistent with abscess. Favor diverticular abscess although atypical features with visible diverticula or other  significant intrinsic colonic wall   thickening/inflammation so recommend follow-up colonoscopy after resolution if surgical intervention not pursued.  2.  No bowel obstruction, free fluid, or free air.  3.  Hepatomegaly with moderate steatosis.       For the majority of the shift this patient's behavior was Green     Cardiac Rhythm: Normal Sinus  Pt needs tele? No  Skin/wound Issues: None    Code Status: Full Code    Pain control: good    Nausea control: good    Abnormal labs/tests/findings requiring intervention: none    Patient tested for COVID 19 prior to admission: NO     OBS brochure/video discussed/provided to patient/family: N/A     Family present during ED course? Yes     Family Comments/Social Situation comments: none    Tasks needing completion: None    Jake Goodwin RN

## 2024-03-09 NOTE — PLAN OF CARE
Goal Outcome Evaluation:      Plan of Care Reviewed With: patient    Overall Patient Progress: no change  Problem: Adult Inpatient Plan of Care  Goal: Absence of Hospital-Acquired Illness or Injury  Intervention: Identify and Manage Fall Risk  Recent Flowsheet Documentation  Taken 3/8/2024 2300 by Maria Eugenia Rodriguez RN  Safety Promotion/Fall Prevention: assistive device/personal items within reach      Problem: Infection  Goal: Absence of Infection Signs and Symptoms  Outcome: Progressing   Problem: Adult Inpatient Plan of Care  Goal: Absence of Hospital-Acquired Illness or Injury  Intervention: Prevent Skin Injury  Recent Flowsheet Documentation  Taken 3/9/2024 0447 by Maria Eugenia Rodriguez RN  Body Position: position changed independently    Problem: Adult Inpatient Plan of Care  Goal: Optimal Comfort and Wellbeing  Intervention: Monitor Pain and Promote Comfort  Recent Flowsheet Documentation  Taken 3/9/2024 0501 by Maria Eugenia Rodriguez RN  Pain Management Interventions: medication (see MAR)  Taken 3/9/2024 0447 by Maria Eugenia Rodriguez RN  Pain Management Interventions: medication (see MAR)  Taken 3/9/2024 0438 by Maria Eugenia Rodriguez RN  Pain Management Interventions: medication (see MAR)  Taken 3/9/2024 0354 by Maria Eugenia Rodriguez RN  Pain Management Interventions: medication (see MAR)  Taken 3/9/2024 0203 by Maria Eugenia Rodriguez RN  Pain Management Interventions: medication (see MAR)  Taken 3/9/2024 0142 by Maria Eugenia Rodriguez RN  Pain Management Interventions: medication (see MAR)  Taken 3/8/2024 2300 by Maria Eugenia Rodriguez RN  Pain Management Interventions: medication (see MAR)  Taken 3/8/2024 2247 by Maria Eugenia Rodriguez RN  Pain Management Interventions: medication (see MAR)  Taken 3/8/2024 2207 by Maria Eugenia Rodriguez RN  Pain Management Interventions: medication (see MAR)  Taken 3/8/2024 2124 by Maria Eugenia Rodriguez RN  Pain Management Interventions: medication (see MAR)        Patient continues on IV fluids and intermittent IV  antibiotics. Pain is managed with current interventions. MD aware of critical ketones. Increased IV fluid rate. Blood glucose and insulin as ordered. Patient is up ad joie. Continue to assess per plan of care and update care team as needed.

## 2024-03-09 NOTE — MEDICATION SCRIBE - ADMISSION MEDICATION HISTORY
Medication Scribe Admission Medication History    Admission medication history is complete. The information provided in this note is only as accurate as the sources available at the time of the update.    Information Source(s): Patient and CareEverywhere/SureScripts via  room phone with patient.    Pertinent Information: Does not use Novolog or Basaglar.  Both from 2020.  Is on Ozempic weekly on Mondays.  He states that he checks his blood glucose once a month and has not done it this month yet.  Was on Atorvastatin 20 mg daily last August for 90 days supply.    Changes made to PTA medication list:  Added: None  Deleted: Novolog and Basaglar Insulin pens.  Changed: Pen needle from 5 times daily to once weekly.    Allergies reviewed with patient and updates made in EHR: yes, no change.    Medication History Completed By: Rosa Saba 3/9/2024 1:28 PM    PTA Med List   Medication Sig Note Last Dose    blood glucose (NO BRAND SPECIFIED) lancets standard Use to test blood sugar 4 times daily or as directed. 3/9/2024: Checking blood glucose once a month at home. 0 this month at Unknown    blood glucose (NO BRAND SPECIFIED) test strip Use to test blood sugar 4 times daily or as directed.  Past Month at Unknown    blood glucose monitoring (NO BRAND SPECIFIED) meter device kit Use to test blood sugar 4 times daily or as directed.  Past Month at Unknown    insulin pen needle (32G X 4 MM) 32G X 4 MM miscellaneous Use 5 pen needles daily or as directed. (Patient taking differently: Use 1 pen needle weekly or as directed.)  3/4/2024 at am    OZEMPIC, 1 MG/DOSE, 4 MG/3ML pen Inject 1 mg Subcutaneous every 7 days On Monday.  3/4/2024 at am

## 2024-03-09 NOTE — ED PROVIDER NOTES
History     Chief Complaint   Patient presents with    Abdominal Pain     HPI  History per patient, review of [facility] EMR and Care Everywhere EMR.    Mynor Rollins is a 23 year old male with insulin-dependent diabetes mellitus type 1 who presents for evaluation of left lower quadrant pain which began insidiously yesterday at approximately noon.  Constant pain and is waxing and waning, was severe earlier and now moderate in severity.  He vomited once earlier today.  No fever chills or vomiting.  No constipation but he has had diarrhea since onset.  No signs or symptoms of GI bleeding.  No history of inflammatory bowel disease.  No family history of inflammatory bowel disease.  No prior history of abdominal surgeries. No other pertinent history or acute complaints or concerns.      Allergies:  No Known Allergies    Problem List:    Patient Active Problem List    Diagnosis Date Noted    Diverticulitis of large intestine with abscess without bleeding 03/08/2024     Priority: Medium    Type 1 diabetes mellitus (H) 03/08/2024     Priority: Medium    DKA (diabetic ketoacidoses) 06/17/2020     Priority: Medium     Replacing diagnoses that were inactivated after the 10/1/2021 regulatory import.      Morbid obesity (H) 06/17/2020     Priority: Medium    Newly diagnosed diabetes (H) 06/17/2020     Priority: Medium    Elevated d-dimer 06/17/2020     Priority: Medium        Past Medical History:    No past medical history on file.    Past Surgical History:    Past Surgical History:   Procedure Laterality Date    NO HISTORY OF SURGERY         Family History:    Family History   Problem Relation Age of Onset    Diabetes Type 2  Father     Diabetes Paternal Grandmother        Social History:  Marital Status:  Single [1]  Social History     Tobacco Use    Smoking status: Never    Smokeless tobacco: Never   Substance Use Topics    Alcohol use: Yes     Comment: Occasionally    Drug use: Never        Medications:    No current  "outpatient medications on file.    Review of Systems  As mentioned in the HPI, in addition focused review of systems was negative.    Physical Exam   BP: (!) 135/92  Pulse: (!) 126  Temp: 100  F (37.8  C)  Resp: 18  Height: 170.2 cm (5' 7\")  Weight: 138.3 kg (305 lb) (stated)  SpO2: 96 %      Physical Exam  Vitals and nursing note reviewed.   Constitutional:       General: He is not in acute distress.     Appearance: Normal appearance. He is well-developed. He is not ill-appearing or diaphoretic.   HENT:      Head: Normocephalic and atraumatic.   Eyes:      General: No scleral icterus.     Extraocular Movements: Extraocular movements intact.      Conjunctiva/sclera: Conjunctivae normal.   Neck:      Trachea: No tracheal deviation.   Cardiovascular:      Rate and Rhythm: Normal rate and regular rhythm.      Heart sounds: Normal heart sounds. No murmur heard.     No friction rub. No gallop.   Pulmonary:      Effort: Pulmonary effort is normal. No respiratory distress.      Breath sounds: Normal breath sounds. No wheezing, rhonchi or rales.   Abdominal:      General: Bowel sounds are decreased. There is no distension.      Palpations: Abdomen is soft.      Tenderness: There is abdominal tenderness in the left lower quadrant. There is no right CVA tenderness, left CVA tenderness, guarding or rebound. Negative signs include Fofana's sign.   Musculoskeletal:         General: Normal range of motion.      Cervical back: Normal range of motion and neck supple.      Right lower leg: No edema.      Left lower leg: No edema.   Skin:     General: Skin is warm and dry.      Coloration: Skin is not pale.      Findings: No erythema or rash.   Neurological:      General: No focal deficit present.      Mental Status: He is alert and oriented to person, place, and time.   Psychiatric:         Mood and Affect: Mood normal.         Behavior: Behavior normal.         ED Course        Procedures                  Results for orders placed " or performed during the hospital encounter of 03/08/24 (from the past 24 hour(s))   CBC with platelets differential    Narrative    The following orders were created for panel order CBC with platelets differential.  Procedure                               Abnormality         Status                     ---------                               -----------         ------                     CBC with platelets and d...[[id number]]  Abnormal            Final result                 Please view results for these tests on the individual orders.   Comprehensive metabolic panel   Result Value Ref Range    Sodium 136 135 - 145 mmol/L    Potassium 3.5 3.4 - 5.3 mmol/L    Carbon Dioxide (CO2) 21 (L) 22 - 29 mmol/L    Anion Gap 16 (H) 7 - 15 mmol/L    Urea Nitrogen 7.4 6.0 - 20.0 mg/dL    Creatinine 0.63 (L) 0.67 - 1.17 mg/dL    GFR Estimate >90 >60 mL/min/1.73m2    Calcium 9.4 8.6 - 10.0 mg/dL    Chloride 99 98 - 107 mmol/L    Glucose 206 (H) 70 - 99 mg/dL    Alkaline Phosphatase 79 40 - 150 U/L    AST 33 0 - 45 U/L    ALT 81 (H) 0 - 70 U/L    Protein Total 8.1 6.4 - 8.3 g/dL    Albumin 4.6 3.5 - 5.2 g/dL    Bilirubin Total 0.8 <=1.2 mg/dL   Lipase   Result Value Ref Range    Lipase 23 13 - 60 U/L   CBC with platelets and differential   Result Value Ref Range    WBC Count 14.7 (H) 4.0 - 11.0 10e3/uL    RBC Count 5.51 4.40 - 5.90 10e6/uL    Hemoglobin 15.7 13.3 - 17.7 g/dL    Hematocrit 46.1 40.0 - 53.0 %    MCV 84 78 - 100 fL    MCH 28.5 26.5 - 33.0 pg    MCHC 34.1 31.5 - 36.5 g/dL    RDW 11.6 10.0 - 15.0 %    Platelet Count 340 150 - 450 10e3/uL    % Neutrophils 80 %    % Lymphocytes 11 %    % Monocytes 8 %    % Eosinophils 1 %    % Basophils 0 %    % Immature Granulocytes 1 %    NRBCs per 100 WBC 0 <1 /100    Absolute Neutrophils 11.8 (H) 1.6 - 8.3 10e3/uL    Absolute Lymphocytes 1.6 0.0 - 5.3 10e3/uL    Absolute Monocytes 1.2 0.0 - 1.3 10e3/uL    Absolute Eosinophils 0.1 0.0 - 0.7 10e3/uL    Absolute Basophils 0.1 0.0 - 0.2  10e3/uL    Absolute Immature Granulocytes 0.1 <=0.4 10e3/uL    Absolute NRBCs 0.0 10e3/uL   Hemoglobin A1c   Result Value Ref Range    Hemoglobin A1C 9.0 (H) <5.7 %   Ketone Beta-Hydroxybutyrate Quantitative   Result Value Ref Range    Ketone (Beta-Hydroxybutyrate) Quantitative 1.60 (HH) <=0.30 mmol/L   CT Abdomen Pelvis w Contrast    Narrative    EXAM: CT ABDOMEN PELVIS W CONTRAST  LOCATION: [facility]  DATE: 3/8/2024    INDICATION: Left lower quadrant pain, vomiting and diarrhea  COMPARISON: None.  TECHNIQUE: CT scan of the abdomen and pelvis was performed following injection of IV contrast. Multiplanar reformats were obtained. Dose reduction techniques were used.  CONTRAST: 100mL Isovue 370    FINDINGS:   LOWER CHEST: Lung bases are clear. Small hiatal hernia.    HEPATOBILIARY: Enlarged with moderate diffuse steatosis. No calcified gallstones or bile duct dilatation.    PANCREAS: Normal.    SPLEEN: Normal.    ADRENAL GLANDS: Normal.    KIDNEYS/BLADDER: Normal.    BOWEL: 3.4 x 3.0 cm extraluminal air/fluid collection left lower quadrant abutting the descending/sigmoid colon junction. Mildly thickened/irregular wall with mild surrounding soft tissue stranding/inflammation. Appearance consistent with abscess. Favor   diverticular abscess although the colon is collapsed without visible diverticula or visible intrinsic wall inflammation/thickening, which is atypical. No bowel obstruction, free fluid or free air.    Normal appendix. Stomach and small bowel unremarkable.    LYMPH NODES: Normal.    VASCULATURE: Unremarkable.    PELVIC ORGANS: No pelvic masses.    MUSCULOSKELETAL: Unremarkable.      Impression    IMPRESSION:   1.  3.4 cm extraluminal air/fluid collection left lower quadrant abutting the colon consistent with abscess. Favor diverticular abscess although atypical features with visible diverticula or other significant intrinsic colonic wall   thickening/inflammation so recommend  follow-up colonoscopy after resolution if surgical intervention not pursued.  2.  No bowel obstruction, free fluid, or free air.  3.  Hepatomegaly with moderate steatosis.   Blood gas venous   Result Value Ref Range    pH Venous 7.39 7.32 - 7.43    pCO2 Venous 38 (L) 40 - 50 mm Hg    pO2 Venous 54 (H) 25 - 47 mm Hg    Bicarbonate Venous 23 21 - 28 mmol/L    Base Excess/Deficit Venous -1.6 -3.0 - 3.0 mmol/L    FIO2 21     Oxyhemoglobin Venous 88 (H) 70 - 75 %    O2 Sat, Venous 89.3 (H) 70.0 - 75.0 %    Narrative    In healthy individuals, oxyhemoglobin (O2Hb) and oxygen saturation (SO2) are approximately equal. In the presence of dyshemoglobins, oxyhemoglobin can be considerably lower than oxygen saturation.   Ketone Beta-Hydroxybutyrate Quantitative   Result Value Ref Range    Ketone (Beta-Hydroxybutyrate) Quantitative 2.10 (HH) <=0.30 mmol/L   Glucose by meter   Result Value Ref Range    GLUCOSE BY METER POCT 183 (H) 70 - 99 mg/dL       Medications   naloxone (NARCAN) injection 0.2 mg (has no administration in time range)     Or   naloxone (NARCAN) injection 0.4 mg (has no administration in time range)     Or   naloxone (NARCAN) injection 0.2 mg (has no administration in time range)     Or   naloxone (NARCAN) injection 0.4 mg (has no administration in time range)   HYDROmorphone (PF) (DILAUDID) injection 0.3-0.5 mg (has no administration in time range)   insulin glargine (BASAGLAR PEN) injection 112 Units (has no administration in time range)   lidocaine 1 % 0.1-1 mL (has no administration in time range)   lidocaine (LMX4) kit (has no administration in time range)   sodium chloride (PF) 0.9% PF flush 3 mL (3 mLs Intracatheter Not Given 3/8/24 2157)   sodium chloride (PF) 0.9% PF flush 3 mL (has no administration in time range)   senna-docusate (SENOKOT-S/PERICOLACE) 8.6-50 MG per tablet 1 tablet (has no administration in time range)     Or   senna-docusate (SENOKOT-S/PERICOLACE) 8.6-50 MG per tablet 2 tablet (has  no administration in time range)   calcium carbonate (TUMS) chewable tablet 1,000 mg (has no administration in time range)   glucose gel 15-30 g (has no administration in time range)     Or   dextrose 50 % injection 25-50 mL (has no administration in time range)     Or   glucagon injection 1 mg (has no administration in time range)   lactated ringers infusion ( Intravenous Not Given 3/8/24 2238)   acetaminophen (TYLENOL) tablet 650 mg (650 mg Oral $Given 3/8/24 2207)     Or   acetaminophen (TYLENOL) Suppository 650 mg ( Rectal See Alternative 3/8/24 2207)   oxyCODONE IR (ROXICODONE) half-tab 2.5 mg (2.5 mg Oral $Given 3/8/24 2207)   oxyCODONE (ROXICODONE) tablet 5 mg (has no administration in time range)   melatonin tablet 5 mg (has no administration in time range)   ondansetron (ZOFRAN ODT) ODT tab 4 mg (has no administration in time range)     Or   ondansetron (ZOFRAN) injection 4 mg (has no administration in time range)   prochlorperazine (COMPAZINE) injection 10 mg (has no administration in time range)     Or   prochlorperazine (COMPAZINE) tablet 10 mg (has no administration in time range)     Or   prochlorperazine (COMPAZINE) suppository 25 mg (has no administration in time range)   insulin aspart (NovoLOG) injection (RAPID ACTING) (1 Units Subcutaneous $Given 3/8/24 2243)   ceFEPIme (MAXIPIME) 2 g vial to attach to  mL bag for ADULTS or 50 mL bag for PEDS (has no administration in time range)   metroNIDAZOLE (FLAGYL) infusion 500 mg (has no administration in time range)   HYDROmorphone (PF) (DILAUDID) injection 0.5 mg (0.5 mg Intravenous $Given 3/8/24 1723)   ondansetron (ZOFRAN) injection 4 mg (4 mg Intravenous $Given 3/8/24 1723)   lactated ringers BOLUS 1,000 mL (0 mLs Intravenous Stopped 3/8/24 2057)   iopamidol (ISOVUE-370) solution 100 mL (100 mLs Intravenous $Given 3/8/24 1738)   sodium chloride 0.9 % bag 500mL for CT scan flush use (60 mLs As instructed $Given 3/8/24 7221)   ceFEPIme (MAXIPIME) 2  g vial to attach to  mL bag for ADULTS or 50 mL bag for PEDS (2 g Intravenous $New Bag 3/8/24 2102)   metroNIDAZOLE (FLAGYL) infusion 500 mg (500 mg Intravenous $New Bag 3/8/24 2207)   sodium chloride 0.9% BOLUS 1,000 mL (1,000 mLs Intravenous $New Bag 3/8/24 2101)     8:22 PM - I reviewed the case and consulted with Dr. Aponte, Surgeon on call.  As the patient's abscess is relatively small, less than 4 cm, it is felt patient could be admitted here for IV antibiotic therapy and he does not currently require interventional radiology services for drainage of this.  He did not have a preference for antibiotic therapy and I will initiate broad-spectrum antibiotic therapy with Cefepime (2 g IV every 8 hours) and Flagyl (500 mg IV every 8 hours).  /79, heart rate 113.    8:33 PM - I reviewed the case with the STEPHAN on-call for the Hospitalist service and accepted care of the patient upon admission here.      Assessments & Plan (with Medical Decision Making)   23-year-old male with left lower quadrant pain since approximately noon yesterday who has an elevated WBC count of 14.7 and CT scan showing a 3.4 cm abscess adjacent descending/sigmoid colon with mildly thickened irregular colon wall with mild surrounding soft tissue stranding/inflammation.  Surgeon on-call was consulted, it was felt patient could be admitted here with IV antibiotic therapy for relatively small diverticular abscess (< 4 cm).  Broad-spectrum antibiotic therapy was initiated with IV cefepime and IV Flagyl.  Blood glucose 206, ketones 1.6 but he is not acidotic or in DKA.  Ketones are believed to be elevated due to vomiting and dehydration.  He was given a 2 L IV fluid bolus, and pain was controlled with IV Dilaudid. I reviewed the case with the STEPHAN for the Hospitalist service and his care was accepted for admission here.     I have reviewed the nursing notes.    I have reviewed the findings, diagnosis, plan and need for follow up with the  patient.    Medical Decision Making: High complex      Current Discharge Medication List          Final diagnoses:   Diverticulitis of large intestine with abscess without bleeding       3/8/2024   [facility] EMERGENCY DEPT       Ben Kasper MD  03/08/24 4010

## 2024-03-09 NOTE — CONSULTS
Patient seen in consultation for pericolonic abscess    HPI:  Patient is a 23 year old male with about 24 hours of left lower quadrant abdominal pain.  He normally is pretty regular but he did have some loose stools beginning yesterday as well.  He has never had a history of diverticulosis.  He has never had abdominal surgery.  He does not typically have to strain to have a bowel movement.  He feels somewhat better today.  His evaluation in the emergency department revealed leukocytosis and a CT scan showing a pericolonic abscess in the left lower quadrant adjacent to the sigmoid colon.  No long segment colitis or obvious diverticulum however, top of the differential is still diverticulitis.  No fevers today.  Still tachycardic but improved.    Review Of Systems    Skin: negative  Ears/Nose/Throat: negative  Respiratory: No shortness of breath, dyspnea on exertion, cough, or hemoptysis  Cardiovascular: negative  Gastrointestinal: as above  Genitourinary: negative  Musculoskeletal: negative  Neurologic: negative  Hematologic/Lymphatic/Immunologic: negative  Endocrine: negative      No past medical history on file.    Past Surgical History:   Procedure Laterality Date    NO HISTORY OF SURGERY         Family History   Problem Relation Age of Onset    Diabetes Type 2  Father     Diabetes Paternal Grandmother        Social History     Socioeconomic History    Marital status: Single     Spouse name: Not on file    Number of children: Not on file    Years of education: Not on file    Highest education level: Not on file   Occupational History    Not on file   Tobacco Use    Smoking status: Never    Smokeless tobacco: Never   Substance and Sexual Activity    Alcohol use: Yes     Comment: Occasionally    Drug use: Never    Sexual activity: Not on file   Other Topics Concern    Not on file   Social History Narrative    Not on file     Social Determinants of Health     Financial Resource Strain: Not on file   Food Insecurity:  "Not on file   Transportation Needs: Not on file   Physical Activity: Not on file   Stress: Not on file   Social Connections: Not on file   Interpersonal Safety: Not on file   Housing Stability: Not on file       No current outpatient medications on file.       Medications and history reviewed    Physical exam:  Vitals: BP (!) 146/91 (BP Location: Left arm)   Pulse 111   Temp 98.9  F (37.2  C) (Oral)   Resp 16   Ht 1.702 m (5' 7\")   Wt 139 kg (306 lb 7 oz)   SpO2 97%   BMI 48.00 kg/m    BMI= Body mass index is 48 kg/m .    Constitutional: alert, non-distressed   Head: normo-cephalic, atraumatic   Cardiovascular: RRR  Respiratory: equal chest rise, good respiratory effort, no audible wheeze  Gastrointestinal: Soft, NON-TENDER, non distended, no masses or hernias palpated , he says pain is there with or without palpation and it is mild  : deferred  Musculoskeletal: moves all extremities   Skin: no suspicious lesions or rashes   Psychiatric: mentation appears normal, affect appropriate   Patient able to get up on table without difficulty.    Labs show:  Results for orders placed or performed during the hospital encounter of 03/08/24 (from the past 24 hour(s))   CBC with platelets differential    Narrative    The following orders were created for panel order CBC with platelets differential.  Procedure                               Abnormality         Status                     ---------                               -----------         ------                     CBC with platelets and d...[[id number]]  Abnormal            Final result                 Please view results for these tests on the individual orders.   Comprehensive metabolic panel   Result Value Ref Range    Sodium 136 135 - 145 mmol/L    Potassium 3.5 3.4 - 5.3 mmol/L    Carbon Dioxide (CO2) 21 (L) 22 - 29 mmol/L    Anion Gap 16 (H) 7 - 15 mmol/L    Urea Nitrogen 7.4 6.0 - 20.0 mg/dL    Creatinine 0.63 (L) 0.67 - 1.17 mg/dL    GFR Estimate >90 >60 " mL/min/1.73m2    Calcium 9.4 8.6 - 10.0 mg/dL    Chloride 99 98 - 107 mmol/L    Glucose 206 (H) 70 - 99 mg/dL    Alkaline Phosphatase 79 40 - 150 U/L    AST 33 0 - 45 U/L    ALT 81 (H) 0 - 70 U/L    Protein Total 8.1 6.4 - 8.3 g/dL    Albumin 4.6 3.5 - 5.2 g/dL    Bilirubin Total 0.8 <=1.2 mg/dL   Lipase   Result Value Ref Range    Lipase 23 13 - 60 U/L   CBC with platelets and differential   Result Value Ref Range    WBC Count 14.7 (H) 4.0 - 11.0 10e3/uL    RBC Count 5.51 4.40 - 5.90 10e6/uL    Hemoglobin 15.7 13.3 - 17.7 g/dL    Hematocrit 46.1 40.0 - 53.0 %    MCV 84 78 - 100 fL    MCH 28.5 26.5 - 33.0 pg    MCHC 34.1 31.5 - 36.5 g/dL    RDW 11.6 10.0 - 15.0 %    Platelet Count 340 150 - 450 10e3/uL    % Neutrophils 80 %    % Lymphocytes 11 %    % Monocytes 8 %    % Eosinophils 1 %    % Basophils 0 %    % Immature Granulocytes 1 %    NRBCs per 100 WBC 0 <1 /100    Absolute Neutrophils 11.8 (H) 1.6 - 8.3 10e3/uL    Absolute Lymphocytes 1.6 0.0 - 5.3 10e3/uL    Absolute Monocytes 1.2 0.0 - 1.3 10e3/uL    Absolute Eosinophils 0.1 0.0 - 0.7 10e3/uL    Absolute Basophils 0.1 0.0 - 0.2 10e3/uL    Absolute Immature Granulocytes 0.1 <=0.4 10e3/uL    Absolute NRBCs 0.0 10e3/uL   Hemoglobin A1c   Result Value Ref Range    Hemoglobin A1C 9.0 (H) <5.7 %   Ketone Beta-Hydroxybutyrate Quantitative   Result Value Ref Range    Ketone (Beta-Hydroxybutyrate) Quantitative 1.60 (HH) <=0.30 mmol/L   CT Abdomen Pelvis w Contrast    Narrative    EXAM: CT ABDOMEN PELVIS W CONTRAST  LOCATION: [facility]  DATE: 3/8/2024    INDICATION: Left lower quadrant pain, vomiting and diarrhea  COMPARISON: None.  TECHNIQUE: CT scan of the abdomen and pelvis was performed following injection of IV contrast. Multiplanar reformats were obtained. Dose reduction techniques were used.  CONTRAST: 100mL Isovue 370    FINDINGS:   LOWER CHEST: Lung bases are clear. Small hiatal hernia.    HEPATOBILIARY: Enlarged with moderate  diffuse steatosis. No calcified gallstones or bile duct dilatation.    PANCREAS: Normal.    SPLEEN: Normal.    ADRENAL GLANDS: Normal.    KIDNEYS/BLADDER: Normal.    BOWEL: 3.4 x 3.0 cm extraluminal air/fluid collection left lower quadrant abutting the descending/sigmoid colon junction. Mildly thickened/irregular wall with mild surrounding soft tissue stranding/inflammation. Appearance consistent with abscess. Favor   diverticular abscess although the colon is collapsed without visible diverticula or visible intrinsic wall inflammation/thickening, which is atypical. No bowel obstruction, free fluid or free air.    Normal appendix. Stomach and small bowel unremarkable.    LYMPH NODES: Normal.    VASCULATURE: Unremarkable.    PELVIC ORGANS: No pelvic masses.    MUSCULOSKELETAL: Unremarkable.      Impression    IMPRESSION:   1.  3.4 cm extraluminal air/fluid collection left lower quadrant abutting the colon consistent with abscess. Favor diverticular abscess although atypical features with visible diverticula or other significant intrinsic colonic wall   thickening/inflammation so recommend follow-up colonoscopy after resolution if surgical intervention not pursued.  2.  No bowel obstruction, free fluid, or free air.  3.  Hepatomegaly with moderate steatosis.   Blood gas venous   Result Value Ref Range    pH Venous 7.39 7.32 - 7.43    pCO2 Venous 38 (L) 40 - 50 mm Hg    pO2 Venous 54 (H) 25 - 47 mm Hg    Bicarbonate Venous 23 21 - 28 mmol/L    Base Excess/Deficit Venous -1.6 -3.0 - 3.0 mmol/L    FIO2 21     Oxyhemoglobin Venous 88 (H) 70 - 75 %    O2 Sat, Venous 89.3 (H) 70.0 - 75.0 %    Narrative    In healthy individuals, oxyhemoglobin (O2Hb) and oxygen saturation (SO2) are approximately equal. In the presence of dyshemoglobins, oxyhemoglobin can be considerably lower than oxygen saturation.   Ketone Beta-Hydroxybutyrate Quantitative   Result Value Ref Range    Ketone (Beta-Hydroxybutyrate) Quantitative 2.10 (HH)  <=0.30 mmol/L   Potassium   Result Value Ref Range    Potassium 3.7 3.4 - 5.3 mmol/L   Magnesium   Result Value Ref Range    Magnesium 1.7 1.7 - 2.3 mg/dL   Phosphorus   Result Value Ref Range    Phosphorus 3.1 2.5 - 4.5 mg/dL   Glucose by meter   Result Value Ref Range    GLUCOSE BY METER POCT 183 (H) 70 - 99 mg/dL   Glucose by meter   Result Value Ref Range    GLUCOSE BY METER POCT 183 (H) 70 - 99 mg/dL   Comprehensive metabolic panel   Result Value Ref Range    Sodium 139 135 - 145 mmol/L    Potassium 4.1 3.4 - 5.3 mmol/L    Carbon Dioxide (CO2) 23 22 - 29 mmol/L    Anion Gap 14 7 - 15 mmol/L    Urea Nitrogen 5.1 (L) 6.0 - 20.0 mg/dL    Creatinine 0.70 0.67 - 1.17 mg/dL    GFR Estimate >90 >60 mL/min/1.73m2    Calcium 8.9 8.6 - 10.0 mg/dL    Chloride 102 98 - 107 mmol/L    Glucose 199 (H) 70 - 99 mg/dL    Alkaline Phosphatase 70 40 - 150 U/L    AST 21 0 - 45 U/L    ALT 59 0 - 70 U/L    Protein Total 7.0 6.4 - 8.3 g/dL    Albumin 4.1 3.5 - 5.2 g/dL    Bilirubin Total 0.9 <=1.2 mg/dL   CBC with platelets   Result Value Ref Range    WBC Count 13.5 (H) 4.0 - 11.0 10e3/uL    RBC Count 4.81 4.40 - 5.90 10e6/uL    Hemoglobin 13.5 13.3 - 17.7 g/dL    Hematocrit 41.6 40.0 - 53.0 %    MCV 87 78 - 100 fL    MCH 28.1 26.5 - 33.0 pg    MCHC 32.5 31.5 - 36.5 g/dL    RDW 11.7 10.0 - 15.0 %    Platelet Count 291 150 - 450 10e3/uL   Ketone Beta-Hydroxybutyrate Quantitative   Result Value Ref Range    Ketone (Beta-Hydroxybutyrate) Quantitative 1.90 (HH) <=0.30 mmol/L   Glucose by meter   Result Value Ref Range    GLUCOSE BY METER POCT 185 (H) 70 - 99 mg/dL   Glucose by meter   Result Value Ref Range    GLUCOSE BY METER POCT 171 (H) 70 - 99 mg/dL       Imaging shows:  Recent Results (from the past 744 hour(s))   CT Abdomen Pelvis w Contrast    Narrative    EXAM: CT ABDOMEN PELVIS W CONTRAST  LOCATION: [facility]  DATE: 3/8/2024    INDICATION: Left lower quadrant pain, vomiting and diarrhea  COMPARISON:  None.  TECHNIQUE: CT scan of the abdomen and pelvis was performed following injection of IV contrast. Multiplanar reformats were obtained. Dose reduction techniques were used.  CONTRAST: 100mL Isovue 370    FINDINGS:   LOWER CHEST: Lung bases are clear. Small hiatal hernia.    HEPATOBILIARY: Enlarged with moderate diffuse steatosis. No calcified gallstones or bile duct dilatation.    PANCREAS: Normal.    SPLEEN: Normal.    ADRENAL GLANDS: Normal.    KIDNEYS/BLADDER: Normal.    BOWEL: 3.4 x 3.0 cm extraluminal air/fluid collection left lower quadrant abutting the descending/sigmoid colon junction. Mildly thickened/irregular wall with mild surrounding soft tissue stranding/inflammation. Appearance consistent with abscess. Favor   diverticular abscess although the colon is collapsed without visible diverticula or visible intrinsic wall inflammation/thickening, which is atypical. No bowel obstruction, free fluid or free air.    Normal appendix. Stomach and small bowel unremarkable.    LYMPH NODES: Normal.    VASCULATURE: Unremarkable.    PELVIC ORGANS: No pelvic masses.    MUSCULOSKELETAL: Unremarkable.      Impression    IMPRESSION:   1.  3.4 cm extraluminal air/fluid collection left lower quadrant abutting the colon consistent with abscess. Favor diverticular abscess although atypical features with visible diverticula or other significant intrinsic colonic wall   thickening/inflammation so recommend follow-up colonoscopy after resolution if surgical intervention not pursued.  2.  No bowel obstruction, free fluid, or free air.  3.  Hepatomegaly with moderate steatosis.        Assessment:     ICD-10-CM    1. Diverticulitis of large intestine with abscess without bleeding  K57.20         Plan: Currently treating as though this is a diverticular abscess. After treatment, recommend diagnostic colonoscopy to evaluate the colon given the unusual presentation and appearance on imaging to make sure no underlying colonic issue.   Should he clinically decline, first step would be to reimage to see if the abscess could be drained percutaneously.  If not, laparoscopic washout and drain placement could also be an option.    60 minutes spent by me on the date of the encounter doing chart review, history and exam, documentation and further activities per the note    Gurpreet Aponte, DO

## 2024-03-09 NOTE — PROGRESS NOTES
"[facility]    Medicine Progress Note - Hospitalist Service    Date of Admission:  3/8/2024    Assessment & Plan     Mynor Rollins is a 23 year old male with past medical hx of T2DM and obesity who presents on 3/8/2024 with 1d hx of LLQ pain. Ct imaging revealing possible diverticular abscess and patient admitted for IV antibiotics and surgical evaluation.      # Diverticular abscess vs other colonic wall thickening/inflammation    Presented with 1 day hx of LLQ pain with N/V/D, chills, fevers. CT abdomen showed 3.4 cm possible abcess. WBC 14.7. Lipase WNL. Tmax 100. Pulse 110s. With question of abscess and size less than 4 cm not currently pursuing drainage and following clinically while providing IV antibiotics.   - Gen surg consulted, appreciate recommendations   - Continued IV cefepime and metronidazole   - LR 175ml/hr  - Continued IV antibiotics   - Pan control with PO oxycodone and IV hydromorphone for breakthrough      # Type 2 diabetes mellitus (H)  Initially diagnosed in 2020 when he presented with DKA and A1c was 11.3.  A1c on admission 9.0. Some question of T1DM vs T2DM. Treated as T2DM outpatient and patient not using insulin at home and only on Ozempic. BS on admission 206.   - Sliding scale insulin     # Elevated anion gap without acidosis, resolved  # Ketonemia  Anion gap 16. Ketones 1.6 ?  2.1. VBG showed pH 7.39. CO2 38. Bicarb 23. Ketones likely elevated from decreased PO intake.          Diet: Clear Liquid Diet    DVT Prophylaxis: Ambulate every shift  Damico Catheter: Not present  Lines: None     Cardiac Monitoring: None  Code Status: Full Code      Clinically Significant Risk Factors Present on Admission                      # DMII: A1C = 9.0 % (Ref range: <5.7 %) within past 6 months    # Severe Obesity: Estimated body mass index is 48 kg/m  as calculated from the following:    Height as of this encounter: 1.702 m (5' 7\").    Weight as of this encounter: 139 kg (306 " lb 7 oz).              Disposition Plan      Expected Discharge Date: 03/10/2024                  Warren Carlisle MD  Hospitalist Service  [facility]  Securely message with [employer] (more info)  Text page via [employer] Paging/Directory   ______________________________________________________________________    Interval History   Admitted overnight.  No acute events since admission.  Patient states that he continues to have some abdominal pain but that it is improved a bit from yesterday.  He is hoping he will be able to go home soon but understanding that he needs to be in the hospital for now.  Denies any fevers, chills, night sweats, palpitations, or chest pain.    Physical Exam   Vital Signs: Temp: 98.9  F (37.2  C) Temp src: Oral BP: (!) 146/91 Pulse: 111   Resp: 16 SpO2: 97 % O2 Device: None (Room air)    Weight: 306 lbs 7.03 oz    General Appearance: Awake and alert, sitting up in bed, no acute distress  Respiratory: Breathing easily on room air  Cardiovascular: Regular rhythm and tachycardic, extremities warm and well-perfused  GI: Abdomen obese, nontender, nondistended  Skin: No rashes or lesions  Other: Appropriate mood and affect    Medical Decision Making       45 MINUTES SPENT BY ME on the date of service doing chart review, history, exam, documentation & further activities per the note.      Data     I have personally reviewed the following data over the past 24 hrs:    13.5 (H)  \   13.5   / 291     139 102 5.1 (L) /  171 (H)   4.1 23 0.70 \     ALT: 59 AST: 21 AP: 70 TBILI: 0.9   ALB: 4.1 TOT PROTEIN: 7.0 LIPASE: 23     TSH: N/A T4: N/A A1C: 9.0 (H)       Imaging results reviewed over the past 24 hrs:   Recent Results (from the past 24 hour(s))   CT Abdomen Pelvis w Contrast    Narrative    EXAM: CT ABDOMEN PELVIS W CONTRAST  LOCATION: [facility]  DATE: 3/8/2024    INDICATION: Left lower quadrant pain, vomiting and diarrhea  COMPARISON:  None.  TECHNIQUE: CT scan of the abdomen and pelvis was performed following injection of IV contrast. Multiplanar reformats were obtained. Dose reduction techniques were used.  CONTRAST: 100mL Isovue 370    FINDINGS:   LOWER CHEST: Lung bases are clear. Small hiatal hernia.    HEPATOBILIARY: Enlarged with moderate diffuse steatosis. No calcified gallstones or bile duct dilatation.    PANCREAS: Normal.    SPLEEN: Normal.    ADRENAL GLANDS: Normal.    KIDNEYS/BLADDER: Normal.    BOWEL: 3.4 x 3.0 cm extraluminal air/fluid collection left lower quadrant abutting the descending/sigmoid colon junction. Mildly thickened/irregular wall with mild surrounding soft tissue stranding/inflammation. Appearance consistent with abscess. Favor   diverticular abscess although the colon is collapsed without visible diverticula or visible intrinsic wall inflammation/thickening, which is atypical. No bowel obstruction, free fluid or free air.    Normal appendix. Stomach and small bowel unremarkable.    LYMPH NODES: Normal.    VASCULATURE: Unremarkable.    PELVIC ORGANS: No pelvic masses.    MUSCULOSKELETAL: Unremarkable.      Impression    IMPRESSION:   1.  3.4 cm extraluminal air/fluid collection left lower quadrant abutting the colon consistent with abscess. Favor diverticular abscess although atypical features with visible diverticula or other significant intrinsic colonic wall   thickening/inflammation so recommend follow-up colonoscopy after resolution if surgical intervention not pursued.  2.  No bowel obstruction, free fluid, or free air.  3.  Hepatomegaly with moderate steatosis.

## 2024-03-09 NOTE — PLAN OF CARE
Problem: Pain Acute  Goal: Optimal Pain Control and Function  Outcome: Not Progressing  Intervention: Develop Pain Management Plan  Recent Flowsheet Documentation  Taken 3/9/2024 1332 by Oriana Gonzalez RN  Pain Management Interventions: medication (see MAR)  Taken 3/9/2024 1052 by Oriana Gonzalez RN  Pain Management Interventions: medication (see MAR)  Taken 3/9/2024 1002 by Oriana Gonzalez RN  Pain Management Interventions: medication (see MAR)  Taken 3/9/2024 0929 by Oriana Gonzalez RN  Pain Management Interventions: medication (see MAR)  Intervention: Prevent or Manage Pain  Recent Flowsheet Documentation  Taken 3/9/2024 0800 by Oriana Gonzalez RN  Medication Review/Management: medications reviewed     Problem: Infection  Goal: Absence of Infection Signs and Symptoms  Outcome: Not Progressing    Patient continues to report LLQ abdominal pain 5-7/10 throughout the day. Oxycodone dose increased to 10mg po every four hours prn and dose given at  1332 today did little to reduce pain. Is currently stating that the only medication that is helping reduce the pain is IV dilaudid.          1310 Web page to Dr Carlisle to update him that oxycodone 10mg is not relieving his pain.

## 2024-03-09 NOTE — PROGRESS NOTES
Patient reporting pain LLQ abdomen 6/10, aching. With two 2.5mg oxycodone given this morning at 0929 AM and 1002 AM patient still reporting pain. Was up and took a shower, ate 50% of clear liquid breakfast. Denies nausea. Sticky note left for MD to review oral pain management. IV dilaudid available to patient and writer to administer per order.

## 2024-03-09 NOTE — PROGRESS NOTES
"[facility] [facility] [place]    Patient admitted to room 2317 at approximately 2130 via cart from emergency room. Patient was accompanied by transport tech.     Verbal SBAR report received from Jake prior to patient arrival.     Patient ambulated to bed independently. Patient alert and oriented X 3. Pain is controlled with current analgesics.  Medication(s) being used: IV Dilaudid in ED.  Admission vital signs: Blood pressure (!) 140/96, pulse (!) 123, temperature 98.5  F (36.9  C), temperature source Oral, resp. rate 19, height 1.702 m (5' 7\"), weight 139 kg (306 lb 7 oz), SpO2 96%. Patient was oriented to plan of care, call light, bed controls, tv, telephone, bathroom, and visiting hours.     Risk Assessment    The following safety risks were identified during admission: none. Yellow risk band applied: NO.     Skin Initial Assessment    This writer admitted this patient and completed a full skin assessment and Reg score in the Adult PCS flowsheet. Appropriate interventions initiated as needed.     Secondary skin check completed by GIL.    Reg Risk Assessment  Sensory Perception: 4-->no impairment  Moisture: 4-->rarely moist  Activity: 3-->walks occasionally  Mobility: 4-->no limitation  Nutrition: 3-->adequate  Friction and Shear: 3-->no apparent problem  Reg Score: 21  Mattress: Standard gel/foam mattress (IsoFlex, Atmos Air, etc.)  Bed Frame: Standard width and length    Education    Patient has a Midnight to Observation order: No  Observation education completed and documented: N/A      Maria Eugenia Rodriguez RN      "

## 2024-03-10 LAB
ANION GAP SERPL CALCULATED.3IONS-SCNC: 13 MMOL/L (ref 7–15)
BUN SERPL-MCNC: 4.5 MG/DL (ref 6–20)
CALCIUM SERPL-MCNC: 9.2 MG/DL (ref 8.6–10)
CHLORIDE SERPL-SCNC: 98 MMOL/L (ref 98–107)
CREAT SERPL-MCNC: 0.59 MG/DL (ref 0.67–1.17)
DEPRECATED HCO3 PLAS-SCNC: 22 MMOL/L (ref 22–29)
EGFRCR SERPLBLD CKD-EPI 2021: >90 ML/MIN/1.73M2
ERYTHROCYTE [DISTWIDTH] IN BLOOD BY AUTOMATED COUNT: 11.5 % (ref 10–15)
GLUCOSE BLDC GLUCOMTR-MCNC: 137 MG/DL (ref 70–99)
GLUCOSE BLDC GLUCOMTR-MCNC: 145 MG/DL (ref 70–99)
GLUCOSE BLDC GLUCOMTR-MCNC: 149 MG/DL (ref 70–99)
GLUCOSE BLDC GLUCOMTR-MCNC: 155 MG/DL (ref 70–99)
GLUCOSE BLDC GLUCOMTR-MCNC: 159 MG/DL (ref 70–99)
GLUCOSE SERPL-MCNC: 191 MG/DL (ref 70–99)
HCT VFR BLD AUTO: 40.9 % (ref 40–53)
HGB BLD-MCNC: 13.6 G/DL (ref 13.3–17.7)
MCH RBC QN AUTO: 28.5 PG (ref 26.5–33)
MCHC RBC AUTO-ENTMCNC: 33.3 G/DL (ref 31.5–36.5)
MCV RBC AUTO: 86 FL (ref 78–100)
PLATELET # BLD AUTO: 299 10E3/UL (ref 150–450)
POTASSIUM SERPL-SCNC: 3.4 MMOL/L (ref 3.4–5.3)
RBC # BLD AUTO: 4.77 10E6/UL (ref 4.4–5.9)
SODIUM SERPL-SCNC: 133 MMOL/L (ref 135–145)
WBC # BLD AUTO: 13.6 10E3/UL (ref 4–11)

## 2024-03-10 PROCEDURE — 82310 ASSAY OF CALCIUM: CPT | Performed by: STUDENT IN AN ORGANIZED HEALTH CARE EDUCATION/TRAINING PROGRAM

## 2024-03-10 PROCEDURE — 250N000011 HC RX IP 250 OP 636: Mod: JZ

## 2024-03-10 PROCEDURE — 258N000003 HC RX IP 258 OP 636

## 2024-03-10 PROCEDURE — 120N000001 HC R&B MED SURG/OB

## 2024-03-10 PROCEDURE — 99231 SBSQ HOSP IP/OBS SF/LOW 25: CPT | Performed by: SURGERY

## 2024-03-10 PROCEDURE — 99207 PR CDG-CUT & PASTE-POTENTIAL IMPACT ON LEVEL: CPT | Performed by: STUDENT IN AN ORGANIZED HEALTH CARE EDUCATION/TRAINING PROGRAM

## 2024-03-10 PROCEDURE — 250N000011 HC RX IP 250 OP 636: Performed by: STUDENT IN AN ORGANIZED HEALTH CARE EDUCATION/TRAINING PROGRAM

## 2024-03-10 PROCEDURE — 250N000013 HC RX MED GY IP 250 OP 250 PS 637

## 2024-03-10 PROCEDURE — 250N000013 HC RX MED GY IP 250 OP 250 PS 637: Performed by: STUDENT IN AN ORGANIZED HEALTH CARE EDUCATION/TRAINING PROGRAM

## 2024-03-10 PROCEDURE — 99233 SBSQ HOSP IP/OBS HIGH 50: CPT | Performed by: STUDENT IN AN ORGANIZED HEALTH CARE EDUCATION/TRAINING PROGRAM

## 2024-03-10 PROCEDURE — 85027 COMPLETE CBC AUTOMATED: CPT | Performed by: STUDENT IN AN ORGANIZED HEALTH CARE EDUCATION/TRAINING PROGRAM

## 2024-03-10 PROCEDURE — 80048 BASIC METABOLIC PNL TOTAL CA: CPT | Performed by: STUDENT IN AN ORGANIZED HEALTH CARE EDUCATION/TRAINING PROGRAM

## 2024-03-10 PROCEDURE — 36415 COLL VENOUS BLD VENIPUNCTURE: CPT | Performed by: STUDENT IN AN ORGANIZED HEALTH CARE EDUCATION/TRAINING PROGRAM

## 2024-03-10 RX ADMIN — KETOROLAC TROMETHAMINE 30 MG: 30 INJECTION, SOLUTION INTRAMUSCULAR at 06:36

## 2024-03-10 RX ADMIN — SODIUM CHLORIDE, POTASSIUM CHLORIDE, SODIUM LACTATE AND CALCIUM CHLORIDE: 600; 310; 30; 20 INJECTION, SOLUTION INTRAVENOUS at 09:32

## 2024-03-10 RX ADMIN — ACETAMINOPHEN 650 MG: 325 TABLET, FILM COATED ORAL at 23:13

## 2024-03-10 RX ADMIN — HYDROMORPHONE HYDROCHLORIDE 0.5 MG: 1 INJECTION, SOLUTION INTRAMUSCULAR; INTRAVENOUS; SUBCUTANEOUS at 11:23

## 2024-03-10 RX ADMIN — METRONIDAZOLE 500 MG: 500 INJECTION, SOLUTION INTRAVENOUS at 14:00

## 2024-03-10 RX ADMIN — OXYCODONE HYDROCHLORIDE 10 MG: 5 TABLET ORAL at 10:41

## 2024-03-10 RX ADMIN — CEFEPIME 2 G: 2 INJECTION, POWDER, FOR SOLUTION INTRAVENOUS at 21:17

## 2024-03-10 RX ADMIN — METRONIDAZOLE 500 MG: 500 INJECTION, SOLUTION INTRAVENOUS at 05:16

## 2024-03-10 RX ADMIN — KETOROLAC TROMETHAMINE 30 MG: 30 INJECTION, SOLUTION INTRAMUSCULAR at 20:07

## 2024-03-10 RX ADMIN — CEFEPIME 2 G: 2 INJECTION, POWDER, FOR SOLUTION INTRAVENOUS at 13:21

## 2024-03-10 RX ADMIN — KETOROLAC TROMETHAMINE 30 MG: 30 INJECTION, SOLUTION INTRAMUSCULAR at 13:17

## 2024-03-10 RX ADMIN — HYDROMORPHONE HYDROCHLORIDE 0.5 MG: 1 INJECTION, SOLUTION INTRAMUSCULAR; INTRAVENOUS; SUBCUTANEOUS at 17:19

## 2024-03-10 RX ADMIN — CEFEPIME 2 G: 2 INJECTION, POWDER, FOR SOLUTION INTRAVENOUS at 04:35

## 2024-03-10 RX ADMIN — OXYCODONE HYDROCHLORIDE 10 MG: 5 TABLET ORAL at 16:39

## 2024-03-10 RX ADMIN — OXYCODONE HYDROCHLORIDE 10 MG: 5 TABLET ORAL at 23:13

## 2024-03-10 RX ADMIN — OXYCODONE HYDROCHLORIDE 10 MG: 5 TABLET ORAL at 03:07

## 2024-03-10 RX ADMIN — SODIUM CHLORIDE, POTASSIUM CHLORIDE, SODIUM LACTATE AND CALCIUM CHLORIDE: 600; 310; 30; 20 INJECTION, SOLUTION INTRAVENOUS at 00:47

## 2024-03-10 ASSESSMENT — ACTIVITIES OF DAILY LIVING (ADL)
ADLS_ACUITY_SCORE: 20
ADLS_ACUITY_SCORE: 20
ADLS_ACUITY_SCORE: 18
ADLS_ACUITY_SCORE: 18
ADLS_ACUITY_SCORE: 19
ADLS_ACUITY_SCORE: 18
ADLS_ACUITY_SCORE: 19
ADLS_ACUITY_SCORE: 18
ADLS_ACUITY_SCORE: 19
ADLS_ACUITY_SCORE: 18
ADLS_ACUITY_SCORE: 18
ADLS_ACUITY_SCORE: 19
ADLS_ACUITY_SCORE: 18
ADLS_ACUITY_SCORE: 18

## 2024-03-10 NOTE — PROGRESS NOTES
"Patient was reporting \"zero\" pain this morning. Had a loose stool this morning and after his shower reported LLQ abdominal pain 8/10 and requested oxycodone which was given. At 1120 patient reported LLQ abdominal pain 10/10 and \"sharp\" , patient requesting IV dilaudid, IV dilaudid 0.5 mg given IV. Continuous pulse oximeter on finger to monitor respiratory status.   "

## 2024-03-10 NOTE — PLAN OF CARE
Problem: Pain Acute  Goal: Optimal Pain Control and Function  Outcome: Progressing  Intervention: Prevent or Manage Pain  Recent Flowsheet Documentation  Taken 3/9/2024 1600 by Joseph Waddell RN  Medication Review/Management: medications reviewed     Abdominal pain has been better controlled this evening with PRN meds. IV toradol given with good relief. Tolerating clear liquid diet. Up independent in room.

## 2024-03-10 NOTE — PROGRESS NOTES
Patient is alert and orientated and will let his needs be known.He is up independently in his room. He has had oxy 10 mg per the MAR for pain tonight. His blood glucose was 155 at 0200. He has received 2 antibiotics Maxipime and flagyl, and tolerated well. He has a PIV that is in the left running LR at 175ml/hr.

## 2024-03-10 NOTE — PROGRESS NOTES
"General Surgery    S: Having some significant pain earlier today but this has resolved again.  He is passing some gas.  Tolerating liquids.  Mild tachycardia persists, afebrile.  White count stable.  O:  Vital signs:  Temp: 98.6  F (37  C) Temp src: Oral BP: (!) 153/99 Pulse: 107   Resp: 16 SpO2: 92 % O2 Device: None (Room air)   Height: 170.2 cm (5' 7\") Weight: 139 kg (306 lb 7 oz)  Estimated body mass index is 48 kg/m  as calculated from the following:    Height as of this encounter: 1.702 m (5' 7\").    Weight as of this encounter: 139 kg (306 lb 7 oz).      Gen: AAOx3, NAD  Heart: RRR  Lungs: CTA  Abd: Soft, non-distended.  Mild tenderness palpation left lower quadrant    Labs/Imaging  Results for orders placed or performed during the hospital encounter of 03/08/24 (from the past 24 hour(s))   Glucose by meter   Result Value Ref Range    GLUCOSE BY METER POCT 194 (H) 70 - 99 mg/dL   Glucose by meter   Result Value Ref Range    GLUCOSE BY METER POCT 168 (H) 70 - 99 mg/dL   Glucose by meter   Result Value Ref Range    GLUCOSE BY METER POCT 155 (H) 70 - 99 mg/dL   Glucose by meter   Result Value Ref Range    GLUCOSE BY METER POCT 149 (H) 70 - 99 mg/dL   Basic metabolic panel   Result Value Ref Range    Sodium 133 (L) 135 - 145 mmol/L    Potassium 3.4 3.4 - 5.3 mmol/L    Chloride 98 98 - 107 mmol/L    Carbon Dioxide (CO2) 22 22 - 29 mmol/L    Anion Gap 13 7 - 15 mmol/L    Urea Nitrogen 4.5 (L) 6.0 - 20.0 mg/dL    Creatinine 0.59 (L) 0.67 - 1.17 mg/dL    GFR Estimate >90 >60 mL/min/1.73m2    Calcium 9.2 8.6 - 10.0 mg/dL    Glucose 191 (H) 70 - 99 mg/dL   CBC with platelets   Result Value Ref Range    WBC Count 13.6 (H) 4.0 - 11.0 10e3/uL    RBC Count 4.77 4.40 - 5.90 10e6/uL    Hemoglobin 13.6 13.3 - 17.7 g/dL    Hematocrit 40.9 40.0 - 53.0 %    MCV 86 78 - 100 fL    MCH 28.5 26.5 - 33.0 pg    MCHC 33.3 31.5 - 36.5 g/dL    RDW 11.5 10.0 - 15.0 %    Platelet Count 299 150 - 450 10e3/uL   Glucose by meter   Result Value " Ref Range    GLUCOSE BY METER POCT 137 (H) 70 - 99 mg/dL           A: Pericolonic abscess with leukocytosis    P: If another episode of worsening abdominal pain or white count does not improve, could consider repeat CT scan tomorrow.  Okay for advance diet as he clinically feels better at the moment.    Dr Aponte

## 2024-03-10 NOTE — PROGRESS NOTES
[facility]    Medicine Progress Note - Hospitalist Service    Date of Admission:  3/8/2024    Assessment & Plan     Mynor Rollins is a 23 year old male with past medical hx of T2DM and obesity who presents on 3/8/2024 with 1d hx of LLQ pain. Ct imaging revealing possible diverticular abscess and patient admitted for IV antibiotics and surgical evaluation.      # Diverticular abscess vs other colonic wall thickening/inflammation   # Leukocytosis    Presented with 1 day hx of LLQ pain with N/V/D, chills, fevers. CT abdomen showed 3.4 cm possible abcess. WBC 14.7. Lipase WNL. Tmax 100. Pulse 110s. With question of abscess and size less than 4 cm not currently pursuing drainage and following clinically while providing IV antibiotics.   - Gen surg consulted, appreciate recommendations   - Continued IV cefepime and metronidazole   - Discontinued IV fluids with good po intake   - Continued IV antibiotics   - Pain control with PO oxycodone and IV hydromorphone for breakthrough      # Type 2 diabetes mellitus (H)  Initially diagnosed in 2020 when he presented with DKA and A1c was 11.3.  A1c on admission 9.0. Some question of T1DM vs T2DM. Treated as T2DM outpatient and patient not using insulin at home and only on Ozempic. BS on admission 206.   - Sliding scale insulin     # Elevated anion gap without acidosis, resolved  # Ketonemia, improved   Anion gap 16. Ketones 1.6 ?  2.1. VBG showed pH 7.39. CO2 38. Bicarb 23. Ketones likely elevated from decreased PO intake.          Diet: Clear Liquid Diet    DVT Prophylaxis: Ambulate every shift  Damico Catheter: Not present  Lines: None     Cardiac Monitoring: None  Code Status: Full Code      Clinically Significant Risk Factors                        # DMII: A1C = 9.0 % (Ref range: <5.7 %) within past 6 months, PRESENT ON ADMISSION  # Severe Obesity: Estimated body mass index is 48 kg/m  as calculated from the following:    Height as of this encounter:  "1.702 m (5' 7\").    Weight as of this encounter: 139 kg (306 lb 7 oz)., PRESENT ON ADMISSION            Disposition Plan      Expected Discharge Date: 03/11/2024                  Warren Carlisle MD  Hospitalist Service  [facility]  Securely message with [employer] (more info)  Text page via [facility] Paging/Directory   ______________________________________________________________________    Interval History   No acute events overnight, abdominal pain continues to improve but still requiring IV opioids to get control.  Some loose stool this morning but denies having any blood in his stool.  He states that he has some chills but that he typically gets chills at night.  Denies any fevers, night sweats, chest pain, palpitations, nausea, or vomiting.    Physical Exam   Vital Signs: Temp: 98.6  F (37  C) Temp src: Oral BP: (!) 153/99 Pulse: 107   Resp: 16 SpO2: 92 % O2 Device: None (Room air)    Weight: 306 lbs 7.03 oz    General Appearance: Awake and alert, sitting up in bed, no acute distress  Respiratory: Breathing easily on room air  Cardiovascular: Regular rhythm and tachycardic, extremities warm and well-perfused  GI: Abdomen obese, mild tenderness to deep palpation in the left lower quadrant, without rebound or guarding.   Skin: No rashes or lesions  Other: Appropriate mood and affect    Medical Decision Making       45 MINUTES SPENT BY ME on the date of service doing chart review, history, exam, documentation & further activities per the note.      Data     I have personally reviewed the following data over the past 24 hrs:    13.6 (H)  \   13.6   / 299     133 (L) 98 4.5 (L) /  137 (H)   3.4 22 0.59 (L) \       Imaging results reviewed over the past 24 hrs:   No results found for this or any previous visit (from the past 24 hour(s)).    "

## 2024-03-11 LAB
ANION GAP SERPL CALCULATED.3IONS-SCNC: 10 MMOL/L (ref 7–15)
BUN SERPL-MCNC: 5.4 MG/DL (ref 6–20)
CALCIUM SERPL-MCNC: 9 MG/DL (ref 8.6–10)
CHLORIDE SERPL-SCNC: 99 MMOL/L (ref 98–107)
CREAT SERPL-MCNC: 0.67 MG/DL (ref 0.67–1.17)
CRP SERPL-MCNC: 100.27 MG/L
DEPRECATED HCO3 PLAS-SCNC: 28 MMOL/L (ref 22–29)
EGFRCR SERPLBLD CKD-EPI 2021: >90 ML/MIN/1.73M2
ERYTHROCYTE [DISTWIDTH] IN BLOOD BY AUTOMATED COUNT: 11.5 % (ref 10–15)
GLUCOSE BLDC GLUCOMTR-MCNC: 123 MG/DL (ref 70–99)
GLUCOSE BLDC GLUCOMTR-MCNC: 139 MG/DL (ref 70–99)
GLUCOSE BLDC GLUCOMTR-MCNC: 143 MG/DL (ref 70–99)
GLUCOSE BLDC GLUCOMTR-MCNC: 153 MG/DL (ref 70–99)
GLUCOSE BLDC GLUCOMTR-MCNC: 168 MG/DL (ref 70–99)
GLUCOSE SERPL-MCNC: 130 MG/DL (ref 70–99)
HCT VFR BLD AUTO: 39.1 % (ref 40–53)
HGB BLD-MCNC: 13.3 G/DL (ref 13.3–17.7)
MCH RBC QN AUTO: 29 PG (ref 26.5–33)
MCHC RBC AUTO-ENTMCNC: 34 G/DL (ref 31.5–36.5)
MCV RBC AUTO: 85 FL (ref 78–100)
PLATELET # BLD AUTO: 319 10E3/UL (ref 150–450)
POTASSIUM SERPL-SCNC: 3.5 MMOL/L (ref 3.4–5.3)
RBC # BLD AUTO: 4.58 10E6/UL (ref 4.4–5.9)
SODIUM SERPL-SCNC: 137 MMOL/L (ref 135–145)
WBC # BLD AUTO: 13 10E3/UL (ref 4–11)

## 2024-03-11 PROCEDURE — 99233 SBSQ HOSP IP/OBS HIGH 50: CPT | Performed by: STUDENT IN AN ORGANIZED HEALTH CARE EDUCATION/TRAINING PROGRAM

## 2024-03-11 PROCEDURE — 85027 COMPLETE CBC AUTOMATED: CPT | Performed by: STUDENT IN AN ORGANIZED HEALTH CARE EDUCATION/TRAINING PROGRAM

## 2024-03-11 PROCEDURE — 250N000013 HC RX MED GY IP 250 OP 250 PS 637: Performed by: STUDENT IN AN ORGANIZED HEALTH CARE EDUCATION/TRAINING PROGRAM

## 2024-03-11 PROCEDURE — 86140 C-REACTIVE PROTEIN: CPT | Performed by: STUDENT IN AN ORGANIZED HEALTH CARE EDUCATION/TRAINING PROGRAM

## 2024-03-11 PROCEDURE — 80048 BASIC METABOLIC PNL TOTAL CA: CPT | Performed by: STUDENT IN AN ORGANIZED HEALTH CARE EDUCATION/TRAINING PROGRAM

## 2024-03-11 PROCEDURE — 250N000011 HC RX IP 250 OP 636: Performed by: STUDENT IN AN ORGANIZED HEALTH CARE EDUCATION/TRAINING PROGRAM

## 2024-03-11 PROCEDURE — 250N000013 HC RX MED GY IP 250 OP 250 PS 637

## 2024-03-11 PROCEDURE — 36415 COLL VENOUS BLD VENIPUNCTURE: CPT | Performed by: STUDENT IN AN ORGANIZED HEALTH CARE EDUCATION/TRAINING PROGRAM

## 2024-03-11 PROCEDURE — 99207 PR CDG-CUT & PASTE-POTENTIAL IMPACT ON LEVEL: CPT | Performed by: STUDENT IN AN ORGANIZED HEALTH CARE EDUCATION/TRAINING PROGRAM

## 2024-03-11 PROCEDURE — 120N000001 HC R&B MED SURG/OB

## 2024-03-11 PROCEDURE — 250N000011 HC RX IP 250 OP 636: Mod: JZ

## 2024-03-11 RX ORDER — IOPAMIDOL 755 MG/ML
100 INJECTION, SOLUTION INTRAVASCULAR ONCE
Status: DISCONTINUED | OUTPATIENT
Start: 2024-03-11 | End: 2024-03-12

## 2024-03-11 RX ADMIN — OXYCODONE HYDROCHLORIDE 10 MG: 5 TABLET ORAL at 08:22

## 2024-03-11 RX ADMIN — HYDROMORPHONE HYDROCHLORIDE 0.5 MG: 1 INJECTION, SOLUTION INTRAMUSCULAR; INTRAVENOUS; SUBCUTANEOUS at 06:54

## 2024-03-11 RX ADMIN — KETOROLAC TROMETHAMINE 30 MG: 30 INJECTION, SOLUTION INTRAMUSCULAR at 08:22

## 2024-03-11 RX ADMIN — HYDROMORPHONE HYDROCHLORIDE 1 MG: 1 INJECTION, SOLUTION INTRAMUSCULAR; INTRAVENOUS; SUBCUTANEOUS at 00:43

## 2024-03-11 RX ADMIN — KETOROLAC TROMETHAMINE 30 MG: 30 INJECTION, SOLUTION INTRAMUSCULAR at 02:42

## 2024-03-11 RX ADMIN — KETOROLAC TROMETHAMINE 30 MG: 30 INJECTION, SOLUTION INTRAMUSCULAR at 22:15

## 2024-03-11 RX ADMIN — METRONIDAZOLE 500 MG: 500 INJECTION, SOLUTION INTRAVENOUS at 01:05

## 2024-03-11 RX ADMIN — OXYCODONE HYDROCHLORIDE 10 MG: 5 TABLET ORAL at 13:05

## 2024-03-11 RX ADMIN — HYDROMORPHONE HYDROCHLORIDE 0.5 MG: 1 INJECTION, SOLUTION INTRAMUSCULAR; INTRAVENOUS; SUBCUTANEOUS at 12:22

## 2024-03-11 RX ADMIN — METRONIDAZOLE 500 MG: 500 INJECTION, SOLUTION INTRAVENOUS at 08:26

## 2024-03-11 RX ADMIN — ACETAMINOPHEN 650 MG: 325 TABLET, FILM COATED ORAL at 12:22

## 2024-03-11 RX ADMIN — CEFEPIME 2 G: 2 INJECTION, POWDER, FOR SOLUTION INTRAVENOUS at 20:17

## 2024-03-11 RX ADMIN — CEFEPIME 2 G: 2 INJECTION, POWDER, FOR SOLUTION INTRAVENOUS at 12:22

## 2024-03-11 RX ADMIN — METRONIDAZOLE 500 MG: 500 INJECTION, SOLUTION INTRAVENOUS at 17:08

## 2024-03-11 RX ADMIN — ACETAMINOPHEN 650 MG: 325 TABLET, FILM COATED ORAL at 21:21

## 2024-03-11 RX ADMIN — HYDROMORPHONE HYDROCHLORIDE 0.5 MG: 1 INJECTION, SOLUTION INTRAMUSCULAR; INTRAVENOUS; SUBCUTANEOUS at 20:17

## 2024-03-11 RX ADMIN — CEFEPIME 2 G: 2 INJECTION, POWDER, FOR SOLUTION INTRAVENOUS at 05:28

## 2024-03-11 RX ADMIN — KETOROLAC TROMETHAMINE 30 MG: 30 INJECTION, SOLUTION INTRAMUSCULAR at 16:09

## 2024-03-11 RX ADMIN — OXYCODONE HYDROCHLORIDE 10 MG: 5 TABLET ORAL at 21:21

## 2024-03-11 ASSESSMENT — ACTIVITIES OF DAILY LIVING (ADL)
ADLS_ACUITY_SCORE: 18

## 2024-03-11 NOTE — PROGRESS NOTES
[facility]    Medicine Progress Note - Hospitalist Service    Date of Admission:  3/8/2024    Assessment & Plan     Mynor Rollins is a 23 year old male with past medical hx of T2DM and obesity who presents on 3/8/2024 with 1d hx of LLQ pain. Ct imaging revealing possible diverticular abscess and patient admitted for IV antibiotics and surgical evaluation.      # Diverticular abscess vs other colonic wall thickening/inflammation   # Leukocytosis    Presented with 1 day hx of LLQ pain with N/V/D, chills, fevers. CT abdomen showed 3.4 cm possible abcess. WBC 14.7. Lipase WNL. Tmax 100. Pulse 110s. With question of abscess and size less than 4 cm not currently pursuing drainage and following clinically while providing IV antibiotics. Clinically stable and with persistent leukocytosis, plan for continued antibiotics with repeat CT scan 3/12  - Gen surg consulted, following, appreciate recommendations   - Continued IV cefepime and metronidazole   - Pain control with PO oxycodone and IV hydromorphone for breakthrough  - CT abdomen pelvis with IV contrast ordered for 3/12       # Type 2 diabetes mellitus (H)  Initially diagnosed in 2020 when he presented with DKA and A1c was 11.3.  A1c on admission 9.0. Some question of T1DM vs T2DM. Treated as T2DM outpatient and patient not using insulin at home and only on Ozempic. BS on admission 206.   - Sliding scale insulin     # Elevated anion gap without acidosis, resolved  # Ketonemia, improved   Anion gap 16. Ketones 1.6 ?  2.1. VBG showed pH 7.39. CO2 38. Bicarb 23. Ketones likely elevated from decreased PO intake.          Diet: Full Liquid Diet    DVT Prophylaxis: Ambulate every shift  Damico Catheter: Not present  Lines: None     Cardiac Monitoring: None  Code Status: Full Code      Clinically Significant Risk Factors                        # DMII: A1C = 9.0 % (Ref range: <5.7 %) within past 6 months, PRESENT ON ADMISSION  # Severe Obesity:  "Estimated body mass index is 48 kg/m  as calculated from the following:    Height as of this encounter: 1.702 m (5' 7\").    Weight as of this encounter: 139 kg (306 lb 7 oz)., PRESENT ON ADMISSION            Disposition Plan     Expected Discharge Date: 03/11/2024                  Warren Carlisle MD  Hospitalist Service  [facility]  Securely message with [employer] (more info)  Text page via [employer] Paging/Directory   ______________________________________________________________________    Interval History   No acute events overnight. States that he continues to have some abdominal pain. Denies fevers but did have some chills this morning. States that he has continued to stool but with some smaller and darker stools but without blood.    Physical Exam   Vital Signs: Temp: 100  F (37.8  C) Temp src: Oral BP: (!) 154/100 Pulse: 107   Resp: 16 SpO2: 97 % O2 Device: None (Room air)    Weight: 306 lbs 7.03 oz    General Appearance: Awake and alert, sitting up in bed, no acute distress  Respiratory: Breathing easily on room air  Cardiovascular: Regular rhythm and tachycardic, extremities warm and well-perfused  GI: Abdomen obese, mild tenderness to deep palpation in the left lower quadrant, without rebound or guarding.   Skin: No rashes or lesions  Other: Appropriate mood and affect    Medical Decision Making       35 MINUTES SPENT BY ME on the date of service doing chart review, history, exam, documentation & further activities per the note.      Data     I have personally reviewed the following data over the past 24 hrs:    13.0 (H)  \   13.3   / 319     137 99 5.4 (L) /  139 (H)   3.5 28 0.67 \       Imaging results reviewed over the past 24 hrs:   No results found for this or any previous visit (from the past 24 hour(s)).    "

## 2024-03-11 NOTE — PROGRESS NOTES
"GENERAL SURGERY Progress Note    Admission Date: 3/8/2024  3/11/2024         Assessment and Plan:     Mynor Rollins is a 23 year old male with leukocytosis, possible diverticular abscess and Type 2 DM    -Continue full liquid diet  -Patient is passing flatus, and has been having BM  -Patient is not having significant improvement in pain   -Slightly tachycardic at 107, BP elevated, WBC 13 (unchanged since 3/9/2024)  -If his pain is not improved again tomorrow 3/12/2024, than we will obtain repeat CT Abd/pelvis                Interval History:     Tolerating full liquids. Having flatus. Continues to report left lower abdominal pain, which is unchanged from admission                      Physical Exam:   Blood pressure (!) 154/100, pulse 107, temperature 100  F (37.8  C), temperature source Oral, resp. rate 16, height 1.702 m (5' 7\"), weight 139 kg (306 lb 7 oz), SpO2 97%.  Temperature Temp  Av.7  F (37.1  C)  Min: 98.1  F (36.7  C)  Max: 100  F (37.8  C)   I/O last 3 completed shifts:  In: 1500 [P.O.:1500]  Out: -     Constitutional- No acute distress, well nourished, non-toxic  Respiratory- nonlabored breathing on room air  Cardiovascular - slightly tachycardic at 107  Abdomen - Obese abdomen. Soft, not rigid. Mild tenderness to palpation to left lower quadrant. No palpable masses or organomegaly. No tenderness to percussion.   Skin: Warm, Dry         Data:     Recent Labs   Lab Test 24  0551 03/10/24  0837 24  0532   WBC 13.0* 13.6* 13.5*   HGB 13.3 13.6 13.5   HCT 39.1* 40.9 41.6    299 291      Recent Labs   Lab Test 24  0551 03/10/24  0837 24  0532    133* 139   POTASSIUM 3.5 3.4 4.1   CHLORIDE 99 98 102   CO2 28 22 23   BUN 5.4* 4.5* 5.1*   CR 0.67 0.59* 0.70     Recent Labs   Lab Test 24  0532 24  1716 20  2315   BILITOTAL 0.9 0.8 0.7   ALT 59 81* 96*   AST 21 33 24   ALKPHOS 70 79 145   LIPASE  --  23  --       No lab results found.  Recent Labs "   Lab Test 03/11/24  0551 03/10/24  0837 03/09/24  0532 03/08/24  2224 06/18/20  0815 06/18/20  0610 06/18/20  0207 06/17/20  2315 06/17/20  1704   CAROL 9.0 9.2 8.9  --    < > 8.2*   < > 8.5 9.4   PHOS  --   --   --  3.1  --  1.4*  --  1.5* 2.9   MAG  --   --   --  1.7  --   --   --  2.3 2.7*    < > = values in this interval not displayed.     Recent Labs   Lab Test 03/11/24  0551 03/10/24  0837 03/09/24  0532 03/08/24  1716 06/18/20  0207 06/17/20  2330 06/17/20  2315   ANIONGAP 10 13 14 16*   < >  --  21*   PROTEIN  --   --   --   --   --  100*  --    ALBUMIN  --   --  4.1 4.6  --   --  4.7    < > = values in this interval not displayed.       ESAU RAMOS PA-C  03/11/24

## 2024-03-11 NOTE — PLAN OF CARE
Problem: Pain Acute  Goal: Optimal Pain Control and Function  Intervention: Prevent or Manage Pain  Recent Flowsheet Documentation  Taken 3/10/2024 1630 by Joseph Waddell RN  Medication Review/Management: medications reviewed     Patient had requested pain medication for complaints 3/10 LLQ abdominal pain this afternoon and was given oxycodone 10 mg with no relief just before suppertime. IV dilaudid 0.5 mg given for complaints of increased sharp abdominal pain. Patient reports IV toradol has been the most effective.

## 2024-03-11 NOTE — PLAN OF CARE
Problem: Infection  Goal: Absence of Infection Signs and Symptoms  3/11/2024 1435 by Tatum An, RN  Outcome: Progressing  3/11/2024 1432 by Tatum An RN  Outcome: Progressing     Problem: Pain Acute  Goal: Optimal Pain Control and Function  Intervention: Prevent or Manage Pain  Recent Flowsheet Documentation  Taken 3/11/2024 0822 by Tatum An, RN  Medication Review/Management: medications reviewed       Alert and oriented. Up independently in room. Continues to need IV Dilaudid for break through pain. 10 mg Oxycodone every 4 hours as needed.  Tolerating full liquid diet.  Bowel Movement this am.

## 2024-03-11 NOTE — PLAN OF CARE
Goal Outcome Evaluation:      Plan of Care Reviewed With: patient    Overall Patient Progress: improvingOverall Patient Progress: improving    Alert and oriented. Independent in room. IV restarted by ED RN. Antibiotic off schedule, retimed in MAR. Patient states pain is down to a 1 after last dose of prn IV Dilaudid.

## 2024-03-12 ENCOUNTER — APPOINTMENT (OUTPATIENT)
Dept: CT IMAGING | Facility: CLINIC | Age: 24
End: 2024-03-12
Attending: STUDENT IN AN ORGANIZED HEALTH CARE EDUCATION/TRAINING PROGRAM
Payer: MEDICAID

## 2024-03-12 DIAGNOSIS — L02.91 ABSCESS: Primary | ICD-10-CM

## 2024-03-12 LAB
ANION GAP SERPL CALCULATED.3IONS-SCNC: 11 MMOL/L (ref 7–15)
BUN SERPL-MCNC: 5.8 MG/DL (ref 6–20)
CALCIUM SERPL-MCNC: 9 MG/DL (ref 8.6–10)
CHLORIDE SERPL-SCNC: 99 MMOL/L (ref 98–107)
CREAT SERPL-MCNC: 0.67 MG/DL (ref 0.67–1.17)
CRP SERPL-MCNC: 105.98 MG/L
DEPRECATED HCO3 PLAS-SCNC: 26 MMOL/L (ref 22–29)
EGFRCR SERPLBLD CKD-EPI 2021: >90 ML/MIN/1.73M2
ERYTHROCYTE [DISTWIDTH] IN BLOOD BY AUTOMATED COUNT: 11.4 % (ref 10–15)
GLUCOSE BLDC GLUCOMTR-MCNC: 113 MG/DL (ref 70–99)
GLUCOSE BLDC GLUCOMTR-MCNC: 129 MG/DL (ref 70–99)
GLUCOSE BLDC GLUCOMTR-MCNC: 136 MG/DL (ref 70–99)
GLUCOSE BLDC GLUCOMTR-MCNC: 142 MG/DL (ref 70–99)
GLUCOSE BLDC GLUCOMTR-MCNC: 153 MG/DL (ref 70–99)
GLUCOSE SERPL-MCNC: 130 MG/DL (ref 70–99)
HCT VFR BLD AUTO: 39.4 % (ref 40–53)
HGB BLD-MCNC: 13 G/DL (ref 13.3–17.7)
MCH RBC QN AUTO: 28 PG (ref 26.5–33)
MCHC RBC AUTO-ENTMCNC: 33 G/DL (ref 31.5–36.5)
MCV RBC AUTO: 85 FL (ref 78–100)
PLATELET # BLD AUTO: 345 10E3/UL (ref 150–450)
POTASSIUM SERPL-SCNC: 3.7 MMOL/L (ref 3.4–5.3)
RBC # BLD AUTO: 4.64 10E6/UL (ref 4.4–5.9)
SODIUM SERPL-SCNC: 136 MMOL/L (ref 135–145)
WBC # BLD AUTO: 12.6 10E3/UL (ref 4–11)

## 2024-03-12 PROCEDURE — 99233 SBSQ HOSP IP/OBS HIGH 50: CPT | Performed by: INTERNAL MEDICINE

## 2024-03-12 PROCEDURE — 120N000001 HC R&B MED SURG/OB

## 2024-03-12 PROCEDURE — 250N000011 HC RX IP 250 OP 636

## 2024-03-12 PROCEDURE — 86140 C-REACTIVE PROTEIN: CPT | Performed by: STUDENT IN AN ORGANIZED HEALTH CARE EDUCATION/TRAINING PROGRAM

## 2024-03-12 PROCEDURE — 250N000011 HC RX IP 250 OP 636: Performed by: STUDENT IN AN ORGANIZED HEALTH CARE EDUCATION/TRAINING PROGRAM

## 2024-03-12 PROCEDURE — 99207 PR CDG-CUT & PASTE-POTENTIAL IMPACT ON LEVEL: CPT | Performed by: INTERNAL MEDICINE

## 2024-03-12 PROCEDURE — 74177 CT ABD & PELVIS W/CONTRAST: CPT

## 2024-03-12 PROCEDURE — 85027 COMPLETE CBC AUTOMATED: CPT | Performed by: STUDENT IN AN ORGANIZED HEALTH CARE EDUCATION/TRAINING PROGRAM

## 2024-03-12 PROCEDURE — 250N000013 HC RX MED GY IP 250 OP 250 PS 637: Performed by: STUDENT IN AN ORGANIZED HEALTH CARE EDUCATION/TRAINING PROGRAM

## 2024-03-12 PROCEDURE — 80048 BASIC METABOLIC PNL TOTAL CA: CPT | Performed by: STUDENT IN AN ORGANIZED HEALTH CARE EDUCATION/TRAINING PROGRAM

## 2024-03-12 PROCEDURE — 36415 COLL VENOUS BLD VENIPUNCTURE: CPT | Performed by: STUDENT IN AN ORGANIZED HEALTH CARE EDUCATION/TRAINING PROGRAM

## 2024-03-12 PROCEDURE — 250N000009 HC RX 250: Performed by: STUDENT IN AN ORGANIZED HEALTH CARE EDUCATION/TRAINING PROGRAM

## 2024-03-12 PROCEDURE — 250N000013 HC RX MED GY IP 250 OP 250 PS 637

## 2024-03-12 RX ORDER — IOPAMIDOL 755 MG/ML
100 INJECTION, SOLUTION INTRAVASCULAR ONCE
Status: COMPLETED | OUTPATIENT
Start: 2024-03-12 | End: 2024-03-12

## 2024-03-12 RX ADMIN — OXYCODONE HYDROCHLORIDE 10 MG: 5 TABLET ORAL at 10:09

## 2024-03-12 RX ADMIN — OXYCODONE HYDROCHLORIDE 10 MG: 5 TABLET ORAL at 17:03

## 2024-03-12 RX ADMIN — CEFEPIME 2 G: 2 INJECTION, POWDER, FOR SOLUTION INTRAVENOUS at 13:47

## 2024-03-12 RX ADMIN — METRONIDAZOLE 500 MG: 500 INJECTION, SOLUTION INTRAVENOUS at 08:31

## 2024-03-12 RX ADMIN — ACETAMINOPHEN 650 MG: 325 TABLET, FILM COATED ORAL at 17:03

## 2024-03-12 RX ADMIN — CEFEPIME 2 G: 2 INJECTION, POWDER, FOR SOLUTION INTRAVENOUS at 21:01

## 2024-03-12 RX ADMIN — SODIUM CHLORIDE 74 ML: 9 INJECTION, SOLUTION INTRAVENOUS at 06:34

## 2024-03-12 RX ADMIN — KETOROLAC TROMETHAMINE 30 MG: 30 INJECTION, SOLUTION INTRAMUSCULAR at 13:51

## 2024-03-12 RX ADMIN — ACETAMINOPHEN 650 MG: 325 TABLET, FILM COATED ORAL at 08:31

## 2024-03-12 RX ADMIN — KETOROLAC TROMETHAMINE 30 MG: 30 INJECTION, SOLUTION INTRAMUSCULAR at 21:34

## 2024-03-12 RX ADMIN — OXYCODONE HYDROCHLORIDE 10 MG: 5 TABLET ORAL at 02:14

## 2024-03-12 RX ADMIN — ACETAMINOPHEN 650 MG: 325 TABLET, FILM COATED ORAL at 02:57

## 2024-03-12 RX ADMIN — METRONIDAZOLE 500 MG: 500 INJECTION, SOLUTION INTRAVENOUS at 17:06

## 2024-03-12 RX ADMIN — IOPAMIDOL 100 ML: 755 INJECTION, SOLUTION INTRAVENOUS at 06:34

## 2024-03-12 RX ADMIN — HYDROMORPHONE HYDROCHLORIDE 0.5 MG: 1 INJECTION, SOLUTION INTRAMUSCULAR; INTRAVENOUS; SUBCUTANEOUS at 11:44

## 2024-03-12 RX ADMIN — HYDROMORPHONE HYDROCHLORIDE 0.5 MG: 1 INJECTION, SOLUTION INTRAMUSCULAR; INTRAVENOUS; SUBCUTANEOUS at 19:28

## 2024-03-12 RX ADMIN — KETOROLAC TROMETHAMINE 30 MG: 30 INJECTION, SOLUTION INTRAMUSCULAR at 06:09

## 2024-03-12 RX ADMIN — METRONIDAZOLE 500 MG: 500 INJECTION, SOLUTION INTRAVENOUS at 02:14

## 2024-03-12 RX ADMIN — CEFEPIME 2 G: 2 INJECTION, POWDER, FOR SOLUTION INTRAVENOUS at 05:34

## 2024-03-12 ASSESSMENT — ACTIVITIES OF DAILY LIVING (ADL)
ADLS_ACUITY_SCORE: 18

## 2024-03-12 NOTE — PROGRESS NOTES
Repeat CT reviewed  Fluid collection has window and can be drained by IR      Discussed with [place], they are able to drain 3/13/2024 AM. Patient will need transport to [facility] and then back to [facility] after procedure.     Patient is now NPO.   Awaiting IR drain placement.

## 2024-03-12 NOTE — PLAN OF CARE
Problem: Pain Acute  Goal: Optimal Pain Control and Function  Outcome: Progressing  Intervention: Develop Pain Management Plan  Flowsheets  Taken 3/12/2024 0550  Pain Management Interventions:   medication (see MAR)   care clustered  Taken 3/11/2024 2010  Pain Management Interventions: medication (see MAR)  Intervention: Prevent or Manage Pain  Flowsheets  Taken 3/12/2024 0550  Sleep/Rest Enhancement: awakenings minimized  Bowel Elimination Promotion: adequate fluid intake promoted  Taken 3/12/2024 0301  Medication Review/Management: medications reviewed  Taken 3/11/2024 2100  Medication Review/Management: medications reviewed  Intervention: Optimize Psychosocial Wellbeing  Flowsheets (Taken 3/12/2024 0550)  Supportive Measures:   positive reinforcement provided   self-care encouraged  Diversional Activities: television     Problem: Infection  Goal: Absence of Infection Signs and Symptoms  Outcome: Progressing         Goal Outcome Evaluation:      Plan of Care Reviewed With: patient    Overall Patient Progress: improving    Outcome Evaluation: Decreased need for IV dilaudid for adequate pain control. Able to manage pain with oral analgesics. Tolerating full liquid diet.

## 2024-03-12 NOTE — PROGRESS NOTES
"[facility] Medicine Progress Note  Date of Service (when I saw the patient): 03/12/2024    REASON FOR ADMISSION / INTERVAL HISTORY:  Mynor Rollins is a 23 year old male with past medical hx of T2DM and obesity who presents on 3/8/2024 with 1d hx of LLQ pain.   Pain is improved. See details below.       Assessment/ Plan     Diverticular abscess vs other colonic wall thickening/inflammation   Leukocytosis    Presented with 1 day hx of LLQ pain with N/V/D, chills, fevers. CT abdomen showed 3.4 cm possible abcess. WBC 14.7. Tmax 100. Was started on cefepime and metronidazole. Repeat CT scan 3/12 showed abscess enlarging.   Gen surg consulted. Plan to transfer to [place] tomorrow for IR guided drainage  - Continued IV cefepime and metronidazole   - Pain control with PO oxycodone and IV hydromorphone for breakthrough     Type 2 diabetes mellitus (H)  Initially diagnosed in 2020 when he presented with DKA and A1c was 11.3.  A1c on admission 9.0. Some question of T1DM vs T2DM. Treated as T2DM outpatient and patient not using insulin at home and only on Ozempic.   -160  - continue Sliding scale insulin     Elevated anion gap without acidosis, resolved  Ketonemia, improved   Anion gap 16. Ketones 1.6 ?  2.1. VBG showed pH 7.39. CO2 38. Bicarb 23. Ketones likely elevated from decreased PO intake.       Diet: NPO per Anesthesia Guidelines for Procedure/Surgery Except for: No Exceptions    DVT Prophylaxis: Ambulate every shift  Damico Catheter: Not present  Code Status: Full Code        TAYLOR COWART MD   Pg [phone]        ROS:  As described in A/P and Exam.  Otherwise ALL are  negative.    PHYSICAL EXAM:  All vitals have been reviewed    Blood pressure 127/87, pulse 94, temperature 99.1  F (37.3  C), temperature source Oral, resp. rate 16, height 1.702 m (5' 7\"), weight 139 kg (306 lb 7 oz), SpO2 98%.    No intake/output data recorded.    GENERAL APPEARANCE: obese, alert and no " distress  EYES: conjunctiva clear, eyes grossly normal  HENT: external ears and nose normal   RESP: lungs clear to auscultation - no rales, rhonchi or wheezes  CV: regular rate and rhythm, normal S1 S2, no S3 or S4 and no murmur, click or rub   ABDOMEN: soft, nontender, no HSM or masses and bowel sounds normal  MS: no clubbing, cyanosis; no edema  SKIN: clear without significant rashes or lesions  NEURO: -non-focal moves all 4 extr    ROUTINE  LABS (Last four results)  CMP  Recent Labs   Lab 03/12/24  1143 03/12/24  0741 03/12/24  0534 03/12/24  0205 03/11/24  0717 03/11/24  0551 03/10/24  1117 03/10/24  0837 03/09/24  0804 03/09/24  0532 03/08/24  2237 03/08/24  2224 03/08/24  1716   NA  --   --  136  --   --  137  --  133*  --  139  --   --  136   POTASSIUM  --   --  3.7  --   --  3.5  --  3.4  --  4.1  --  3.7 3.5   CHLORIDE  --   --  99  --   --  99  --  98  --  102  --   --  99   CO2  --   --  26  --   --  28  --  22  --  23  --   --  21*   ANIONGAP  --   --  11  --   --  10  --  13  --  14  --   --  16*   * 113* 130* 136*   < > 130*   < > 191*   < > 199*   < >  --  206*   BUN  --   --  5.8*  --   --  5.4*  --  4.5*  --  5.1*  --   --  7.4   CR  --   --  0.67  --   --  0.67  --  0.59*  --  0.70  --   --  0.63*   GFRESTIMATED  --   --  >90  --   --  >90  --  >90  --  >90  --   --  >90   CAROL  --   --  9.0  --   --  9.0  --  9.2  --  8.9  --   --  9.4   MAG  --   --   --   --   --   --   --   --   --   --   --  1.7  --    PHOS  --   --   --   --   --   --   --   --   --   --   --  3.1  --    PROTTOTAL  --   --   --   --   --   --   --   --   --  7.0  --   --  8.1   ALBUMIN  --   --   --   --   --   --   --   --   --  4.1  --   --  4.6   BILITOTAL  --   --   --   --   --   --   --   --   --  0.9  --   --  0.8   ALKPHOS  --   --   --   --   --   --   --   --   --  70  --   --  79   AST  --   --   --   --   --   --   --   --   --  21  --   --  33   ALT  --   --   --   --   --   --   --   --   --  59  --   --   81*    < > = values in this interval not displayed.     CBC  Recent Labs   Lab 03/12/24  0534 03/11/24  0551 03/10/24  0837 03/09/24  0532   WBC 12.6* 13.0* 13.6* 13.5*   RBC 4.64 4.58 4.77 4.81   HGB 13.0* 13.3 13.6 13.5   HCT 39.4* 39.1* 40.9 41.6   MCV 85 85 86 87   MCH 28.0 29.0 28.5 28.1   MCHC 33.0 34.0 33.3 32.5   RDW 11.4 11.5 11.5 11.7    319 299 291     INRNo lab results found in last 7 days.  Arterial Blood Gas  Recent Labs   Lab 03/08/24  2102   O2PER 21       Recent Results (from the past 24 hour(s))   CT Abd/Pelvis w Contrast*    Narrative    CT ABDOMEN PELVIS W CONTRAST 3/12/2024 6:56 AM    CLINICAL HISTORY: Colonic fluid collection on CT 3/8 looking for  interval change with continued IV antibioitcs    TECHNIQUE: CT scan of the abdomen and pelvis was performed following  injection of IV contrast. Multiplanar reformats were obtained. Dose  reduction techniques were used.  CONTRAST: 100 mL Isovue 370    COMPARISON: CT abdomen and pelvis 3/8/2024    FINDINGS:   LOWER CHEST: Normal.    HEPATOBILIARY: Diffuse hepatic steatosis with fatty sparing of the  gallbladder. No worrisome liver lesion. No calcified gallstones or  biliary dilation.    PANCREAS: Normal.    SPLEEN: Normal.    ADRENAL GLANDS: Normal.    KIDNEYS/BLADDER: No significant mass, stones, or hydronephrosis.    BOWEL: Enlarging complex 5.2 x 5.7 cm fluid and gas collection in the  left lower quadrant (4/168) previously 3.0 x 3.4 cm. As before, it  abuts both the descending and sigmoid colon. Possible sinus tract  arising from the distal descending/proximal sigmoid colon (4/174-176).  Colonic diverticulosis. No bowel obstruction. Normal appendix.    LYMPH NODES: No abdominopelvic lymphadenopathy.    VASCULAR: Normal caliber abdominal aorta.    PELVIC ORGANS: Normal.    ADDITIONAL FINDINGS: No free air.    MUSCULOSKELETAL: Normal.      Impression    IMPRESSION:   1.  Enlarging left lower quadrant pericolonic fluid and gas collection  is  3/8/2024 with appearance most consistent with an abscess. Possible  sinus tract connecting the abscess:.    2.  No bowel obstruction.    GEE RIOS MD         SYSTEM ID:  [id number]

## 2024-03-12 NOTE — PLAN OF CARE
Problem: Infection  Goal: Absence of Infection Signs and Symptoms  3/12/2024 1850 by Tatum An, RN  Outcome: Not Progressing  3/12/2024 1850 by Tatum An RN  Outcome: Not Progressing     Problem: Pain Acute  Goal: Optimal Pain Control and Function  Intervention: Prevent or Manage Pain  Recent Flowsheet Documentation  Taken 3/12/2024 1703 by Tatum An, RN  Sleep/Rest Enhancement: awakenings minimized  Bowel Elimination Promotion: adequate fluid intake promoted  Medication Review/Management: medications reviewed  Taken 3/12/2024 0831 by Tatum An, RN  Sleep/Rest Enhancement: awakenings minimized  Bowel Elimination Promotion: adequate fluid intake promoted  Medication Review/Management: medications reviewed   Goal Outcome Evaluation      Plan of Care Reviewed With: patient    Alert and oriented. Up independently in room. Pain controlled with as needed medications. IV antibiotics administered. Did not required Novolog today.   NPO at midnight.   Aware of plan to transport to [facility] to have ct drainage of abscess tomorrow.

## 2024-03-12 NOTE — PROGRESS NOTES
"GENERAL SURGERY Progress Note    Admission Date: 3/8/2024  3/12/2024         Assessment and Plan:     Mynor Rollins is a 23 year old male with abdominal pain, pelvic abscess, Type 2 DM and possible diverticular disease/abscess     - Continues to have abdominal pain, CT ordered  -Awaiting CT results, images in pacs pending   -Continue current cares pending CT updates             Interval History:     Persistent LLQ pain.   Afebrile. Tolerating diet. Passing flatus and stool. No nausea or vomiting. WBC down slightly from 13 to 12.6, last temp at 99.1                     Physical Exam:   Blood pressure 127/87, pulse 94, temperature 99.1  F (37.3  C), temperature source Oral, resp. rate 16, height 1.702 m (5' 7\"), weight 139 kg (306 lb 7 oz), SpO2 98%.  Temperature Temp  Av.6  F (37  C)  Min: 98  F (36.7  C)  Max: 99.1  F (37.3  C)   No intake/output data recorded.    Constitutional- No acute distress, well nourished, non-toxic  Respiratory- nonlabored breathing on room air  Cardiovascular - Heart RRR  Abdomen - Soft, nontender, nondistended. No palpable masses or organomegaly. Morbidly obese abdomen. No tenderness to percussion or palpation. No tenderness on exam, only tenderness elicited by patient if prompted.   Skin: Warm, Dry         Data:     Recent Labs   Lab Test 24  0534 24  0551 03/10/24  0837   WBC 12.6* 13.0* 13.6*   HGB 13.0* 13.3 13.6   HCT 39.4* 39.1* 40.9    319 299      Recent Labs   Lab Test 24  0534 24  0551 03/10/24  0837    137 133*   POTASSIUM 3.7 3.5 3.4   CHLORIDE 99 99 98   CO2 26 28 22   BUN 5.8* 5.4* 4.5*   CR 0.67 0.67 0.59*     Recent Labs   Lab Test 24  0532 24  1716 20  2315   BILITOTAL 0.9 0.8 0.7   ALT 59 81* 96*   AST 21 33 24   ALKPHOS 70 79 145   LIPASE  --  23  --       No lab results found.  Recent Labs   Lab Test 24  0534 24  0551 03/10/24  0837 24  0532 24  2224 20  0815 20  0610 " 06/18/20  0207 06/17/20  2315 06/17/20  1704   CAROL 9.0 9.0 9.2   < >  --    < > 8.2*   < > 8.5 9.4   PHOS  --   --   --   --  3.1  --  1.4*  --  1.5* 2.9   MAG  --   --   --   --  1.7  --   --   --  2.3 2.7*    < > = values in this interval not displayed.     Recent Labs   Lab Test 03/12/24  0534 03/11/24  0551 03/10/24  0837 03/09/24  0532 03/08/24  1716 06/18/20  0207 06/17/20  2330 06/17/20  2315   ANIONGAP 11 10 13 14 16*   < >  --  21*   PROTEIN  --   --   --   --   --   --  100*  --    ALBUMIN  --   --   --  4.1 4.6  --   --  4.7    < > = values in this interval not displayed.       ESAU RAMOS PA-C  03/12/24

## 2024-03-12 NOTE — PROGRESS NOTES
"    Outpatient IR Referral  03/12/24    Referring Provider:  Carmen Ramires PA-C  IR Referral Request: IR drainage catheter placement into left pelvic abscess  Procedure Approved for:  ***.  See *** series ***image    Path entered    Case and imaging was reviewed with Dr. Octavio Mahoney MD    Brief History:    Mynor Rollins is a 23 year old male with history of DM2, obesity     Pertinent Imaging Reviewed:    ***    Procedure order and surgical pathology order placed.    If requesting team would like sample sent for anything else please use the IR order set \"IR RAD Biopsy or Fluid Aspirate Specimens\" to select your necessary diagnostic labs and pend for admission.   If there are labs you desire that are not found in this order set or you have questions regarding specific diagnostic labs please call the associated lab personnel.     Requesting team, ***, made aware of IR recommendations via Epic messaging.    ***Clinic visit with IR provider will be coordinated to discuss biopsy procedure, risks and benefits as needed.     Elle Emmanuel PA-C  Interventional Radiology   IR on-call pager: [phone]    "

## 2024-03-13 ENCOUNTER — HOSPITAL ENCOUNTER (INPATIENT)
Facility: CLINIC | Age: 24
LOS: 4 days | Discharge: ANOTHER HOSPITAL | End: 2024-03-18
Attending: INTERNAL MEDICINE | Admitting: INTERNAL MEDICINE
Payer: MEDICAID

## 2024-03-13 ENCOUNTER — HOSPITAL ENCOUNTER (OUTPATIENT)
Dept: CT IMAGING | Facility: HOSPITAL | Age: 24
Discharge: HOME | End: 2024-03-13
Attending: PHYSICIAN ASSISTANT | Admitting: STUDENT IN AN ORGANIZED HEALTH CARE EDUCATION/TRAINING PROGRAM
Payer: MEDICAID

## 2024-03-13 VITALS
HEIGHT: 67 IN | OXYGEN SATURATION: 96 % | TEMPERATURE: 99 F | RESPIRATION RATE: 18 BRPM | BODY MASS INDEX: 48.1 KG/M2 | WEIGHT: 306.44 LBS | SYSTOLIC BLOOD PRESSURE: 159 MMHG | DIASTOLIC BLOOD PRESSURE: 98 MMHG | HEART RATE: 118 BPM

## 2024-03-13 VITALS
DIASTOLIC BLOOD PRESSURE: 84 MMHG | SYSTOLIC BLOOD PRESSURE: 135 MMHG | RESPIRATION RATE: 16 BRPM | OXYGEN SATURATION: 99 % | HEART RATE: 102 BPM | TEMPERATURE: 98.2 F

## 2024-03-13 DIAGNOSIS — K57.20 DIVERTICULITIS OF LARGE INTESTINE WITH ABSCESS WITHOUT BLEEDING: ICD-10-CM

## 2024-03-13 DIAGNOSIS — K57.20 DIVERTICULITIS OF LARGE INTESTINE WITH ABSCESS WITHOUT BLEEDING: Primary | ICD-10-CM

## 2024-03-13 DIAGNOSIS — L02.91 ABSCESS: ICD-10-CM

## 2024-03-13 LAB
GLUCOSE BLDC GLUCOMTR-MCNC: 128 MG/DL (ref 70–99)
GLUCOSE BLDC GLUCOMTR-MCNC: 139 MG/DL (ref 70–99)
GLUCOSE BLDC GLUCOMTR-MCNC: 175 MG/DL (ref 70–99)
GLUCOSE BLDC GLUCOMTR-MCNC: 191 MG/DL (ref 70–99)
INR PPP: 1.2 (ref 0.85–1.15)

## 2024-03-13 PROCEDURE — 82962 GLUCOSE BLOOD TEST: CPT

## 2024-03-13 PROCEDURE — 87075 CULTR BACTERIA EXCEPT BLOOD: CPT | Performed by: STUDENT IN AN ORGANIZED HEALTH CARE EDUCATION/TRAINING PROGRAM

## 2024-03-13 PROCEDURE — 99152 MOD SED SAME PHYS/QHP 5/>YRS: CPT

## 2024-03-13 PROCEDURE — 36415 COLL VENOUS BLD VENIPUNCTURE: CPT | Performed by: STUDENT IN AN ORGANIZED HEALTH CARE EDUCATION/TRAINING PROGRAM

## 2024-03-13 PROCEDURE — 360N000001 HC INTERCOMPANY SERVICE, AMB SURG 360 OPNP

## 2024-03-13 PROCEDURE — 250N000011 HC RX IP 250 OP 636: Mod: JZ

## 2024-03-13 PROCEDURE — 360N000006 HC INTERCOMPANY SERVICE, OPERATIVE 360 OPNP

## 2024-03-13 PROCEDURE — 120N000001 HC R&B MED SURG/OB

## 2024-03-13 PROCEDURE — 300N000001 HC INTERCOMPANY SERVICE, LAB 300 OPNP

## 2024-03-13 PROCEDURE — 0W9G30Z DRAINAGE OF PERITONEAL CAVITY WITH DRAINAGE DEVICE, PERCUTANEOUS APPROACH: ICD-10-PCS | Performed by: STUDENT IN AN ORGANIZED HEALTH CARE EDUCATION/TRAINING PROGRAM

## 2024-03-13 PROCEDURE — 370N000020 HC INTERCOMPANY SERVICE, ANESTHESIA 370 OPNP

## 2024-03-13 PROCEDURE — 250N000013 HC RX MED GY IP 250 OP 250 PS 637

## 2024-03-13 PROCEDURE — 250N000011 HC RX IP 250 OP 636

## 2024-03-13 PROCEDURE — 272N000006 HC INTERCOMPANY SERVICE, SUPPLY 272 OPNP

## 2024-03-13 PROCEDURE — 250N000011 HC RX IP 250 OP 636: Performed by: STUDENT IN AN ORGANIZED HEALTH CARE EDUCATION/TRAINING PROGRAM

## 2024-03-13 PROCEDURE — 250N000012 HC RX MED GY IP 250 OP 636 PS 637

## 2024-03-13 PROCEDURE — 99232 SBSQ HOSP IP/OBS MODERATE 35: CPT

## 2024-03-13 PROCEDURE — 87070 CULTURE OTHR SPECIMN AEROBIC: CPT | Performed by: STUDENT IN AN ORGANIZED HEALTH CARE EDUCATION/TRAINING PROGRAM

## 2024-03-13 PROCEDURE — 636N000001 HC INTERCOMPANY SERVICE, DRUGS DETAILED 636 OPNP

## 2024-03-13 PROCEDURE — 250N000013 HC RX MED GY IP 250 OP 250 PS 637: Performed by: STUDENT IN AN ORGANIZED HEALTH CARE EDUCATION/TRAINING PROGRAM

## 2024-03-13 PROCEDURE — 272N000042 CT ABDOMEN PERITONEUM ABSCESS DRAIN W CATH PLACE

## 2024-03-13 PROCEDURE — 85610 PROTHROMBIN TIME: CPT | Performed by: STUDENT IN AN ORGANIZED HEALTH CARE EDUCATION/TRAINING PROGRAM

## 2024-03-13 RX ORDER — OXYCODONE HYDROCHLORIDE 5 MG/1
10 TABLET ORAL EVERY 4 HOURS PRN
Status: CANCELLED | OUTPATIENT
Start: 2024-03-13

## 2024-03-13 RX ORDER — ONDANSETRON 2 MG/ML
4 INJECTION INTRAMUSCULAR; INTRAVENOUS EVERY 6 HOURS PRN
Status: DISCONTINUED | OUTPATIENT
Start: 2024-03-13 | End: 2024-03-18 | Stop reason: HOSPADM

## 2024-03-13 RX ORDER — METRONIDAZOLE 500 MG/100ML
500 INJECTION, SOLUTION INTRAVENOUS EVERY 8 HOURS
Status: DISCONTINUED | OUTPATIENT
Start: 2024-03-13 | End: 2024-03-14

## 2024-03-13 RX ORDER — HYDROMORPHONE HYDROCHLORIDE 1 MG/ML
.5-1 INJECTION, SOLUTION INTRAMUSCULAR; INTRAVENOUS; SUBCUTANEOUS
Status: CANCELLED | OUTPATIENT
Start: 2024-03-13

## 2024-03-13 RX ORDER — NALOXONE HYDROCHLORIDE 0.4 MG/ML
0.2 INJECTION, SOLUTION INTRAMUSCULAR; INTRAVENOUS; SUBCUTANEOUS
Status: CANCELLED | OUTPATIENT
Start: 2024-03-13

## 2024-03-13 RX ORDER — NALOXONE HYDROCHLORIDE 0.4 MG/ML
0.4 INJECTION, SOLUTION INTRAMUSCULAR; INTRAVENOUS; SUBCUTANEOUS
Status: DISCONTINUED | OUTPATIENT
Start: 2024-03-13 | End: 2024-03-14 | Stop reason: HOSPADM

## 2024-03-13 RX ORDER — DEXTROSE MONOHYDRATE 25 G/50ML
25-50 INJECTION, SOLUTION INTRAVENOUS
Status: DISCONTINUED | OUTPATIENT
Start: 2024-03-13 | End: 2024-03-18 | Stop reason: HOSPADM

## 2024-03-13 RX ORDER — AMOXICILLIN 250 MG
2 CAPSULE ORAL 2 TIMES DAILY PRN
Status: DISCONTINUED | OUTPATIENT
Start: 2024-03-13 | End: 2024-03-18 | Stop reason: HOSPADM

## 2024-03-13 RX ORDER — LIDOCAINE 40 MG/G
CREAM TOPICAL
Status: DISCONTINUED | OUTPATIENT
Start: 2024-03-13 | End: 2024-03-18 | Stop reason: HOSPADM

## 2024-03-13 RX ORDER — NALOXONE HYDROCHLORIDE 0.4 MG/ML
0.4 INJECTION, SOLUTION INTRAMUSCULAR; INTRAVENOUS; SUBCUTANEOUS
Status: DISCONTINUED | OUTPATIENT
Start: 2024-03-13 | End: 2024-03-13

## 2024-03-13 RX ORDER — AMOXICILLIN 250 MG
1 CAPSULE ORAL 2 TIMES DAILY PRN
Status: DISCONTINUED | OUTPATIENT
Start: 2024-03-13 | End: 2024-03-18 | Stop reason: HOSPADM

## 2024-03-13 RX ORDER — NALOXONE HYDROCHLORIDE 0.4 MG/ML
0.2 INJECTION, SOLUTION INTRAMUSCULAR; INTRAVENOUS; SUBCUTANEOUS
Status: DISCONTINUED | OUTPATIENT
Start: 2024-03-13 | End: 2024-03-13

## 2024-03-13 RX ORDER — ONDANSETRON 2 MG/ML
4 INJECTION INTRAMUSCULAR; INTRAVENOUS EVERY 6 HOURS PRN
Status: CANCELLED | OUTPATIENT
Start: 2024-03-13

## 2024-03-13 RX ORDER — NICOTINE POLACRILEX 4 MG
15-30 LOZENGE BUCCAL
Status: DISCONTINUED | OUTPATIENT
Start: 2024-03-13 | End: 2024-03-18 | Stop reason: HOSPADM

## 2024-03-13 RX ORDER — CALCIUM CARBONATE 500 MG/1
1000 TABLET, CHEWABLE ORAL 4 TIMES DAILY PRN
Status: DISCONTINUED | OUTPATIENT
Start: 2024-03-13 | End: 2024-03-18 | Stop reason: HOSPADM

## 2024-03-13 RX ORDER — OXYCODONE HYDROCHLORIDE 5 MG/1
10 TABLET ORAL EVERY 4 HOURS PRN
Status: DISCONTINUED | OUTPATIENT
Start: 2024-03-13 | End: 2024-03-17

## 2024-03-13 RX ORDER — FENTANYL CITRATE 50 UG/ML
25-50 INJECTION, SOLUTION INTRAMUSCULAR; INTRAVENOUS EVERY 5 MIN PRN
Status: DISCONTINUED | OUTPATIENT
Start: 2024-03-13 | End: 2024-03-13

## 2024-03-13 RX ORDER — NALOXONE HYDROCHLORIDE 0.4 MG/ML
0.4 INJECTION, SOLUTION INTRAMUSCULAR; INTRAVENOUS; SUBCUTANEOUS
Status: CANCELLED | OUTPATIENT
Start: 2024-03-13

## 2024-03-13 RX ORDER — NALOXONE HYDROCHLORIDE 0.4 MG/ML
0.2 INJECTION, SOLUTION INTRAMUSCULAR; INTRAVENOUS; SUBCUTANEOUS
Status: DISCONTINUED | OUTPATIENT
Start: 2024-03-13 | End: 2024-03-14 | Stop reason: HOSPADM

## 2024-03-13 RX ORDER — PROCHLORPERAZINE MALEATE 10 MG
10 TABLET ORAL EVERY 6 HOURS PRN
Status: CANCELLED | OUTPATIENT
Start: 2024-03-13

## 2024-03-13 RX ORDER — ONDANSETRON 4 MG/1
4 TABLET, ORALLY DISINTEGRATING ORAL EVERY 6 HOURS PRN
Status: CANCELLED | OUTPATIENT
Start: 2024-03-13

## 2024-03-13 RX ORDER — FENTANYL CITRATE 50 UG/ML
50 INJECTION, SOLUTION INTRAMUSCULAR; INTRAVENOUS ONCE
Status: COMPLETED | OUTPATIENT
Start: 2024-03-13 | End: 2024-03-13

## 2024-03-13 RX ORDER — CEFEPIME HYDROCHLORIDE 2 G/1
2 INJECTION, POWDER, FOR SOLUTION INTRAVENOUS EVERY 8 HOURS
Status: CANCELLED | OUTPATIENT
Start: 2024-03-13

## 2024-03-13 RX ORDER — NICOTINE POLACRILEX 4 MG
15-30 LOZENGE BUCCAL
Status: CANCELLED | OUTPATIENT
Start: 2024-03-13

## 2024-03-13 RX ORDER — HYDROMORPHONE HYDROCHLORIDE 1 MG/ML
.5-1 INJECTION, SOLUTION INTRAMUSCULAR; INTRAVENOUS; SUBCUTANEOUS
Status: DISCONTINUED | OUTPATIENT
Start: 2024-03-13 | End: 2024-03-18 | Stop reason: HOSPADM

## 2024-03-13 RX ORDER — ACETAMINOPHEN 325 MG/1
650 TABLET ORAL EVERY 4 HOURS PRN
Status: CANCELLED | OUTPATIENT
Start: 2024-03-13

## 2024-03-13 RX ORDER — CALCIUM CARBONATE 500 MG/1
1000 TABLET, CHEWABLE ORAL 4 TIMES DAILY PRN
Status: CANCELLED | OUTPATIENT
Start: 2024-03-13

## 2024-03-13 RX ORDER — DEXTROSE MONOHYDRATE 25 G/50ML
25-50 INJECTION, SOLUTION INTRAVENOUS
Status: CANCELLED | OUTPATIENT
Start: 2024-03-13

## 2024-03-13 RX ORDER — PROCHLORPERAZINE 25 MG
25 SUPPOSITORY, RECTAL RECTAL EVERY 12 HOURS PRN
Status: DISCONTINUED | OUTPATIENT
Start: 2024-03-13 | End: 2024-03-18 | Stop reason: HOSPADM

## 2024-03-13 RX ORDER — AMOXICILLIN 250 MG
1 CAPSULE ORAL 2 TIMES DAILY PRN
Status: CANCELLED | OUTPATIENT
Start: 2024-03-13

## 2024-03-13 RX ORDER — ONDANSETRON 4 MG/1
4 TABLET, ORALLY DISINTEGRATING ORAL EVERY 6 HOURS PRN
Status: DISCONTINUED | OUTPATIENT
Start: 2024-03-13 | End: 2024-03-18 | Stop reason: HOSPADM

## 2024-03-13 RX ORDER — CEFEPIME HYDROCHLORIDE 2 G/1
2 INJECTION, POWDER, FOR SOLUTION INTRAVENOUS EVERY 8 HOURS
Status: DISCONTINUED | OUTPATIENT
Start: 2024-03-13 | End: 2024-03-14

## 2024-03-13 RX ORDER — LIDOCAINE 40 MG/G
CREAM TOPICAL
Status: CANCELLED | OUTPATIENT
Start: 2024-03-13

## 2024-03-13 RX ORDER — PROCHLORPERAZINE MALEATE 10 MG
10 TABLET ORAL EVERY 6 HOURS PRN
Status: DISCONTINUED | OUTPATIENT
Start: 2024-03-13 | End: 2024-03-18 | Stop reason: HOSPADM

## 2024-03-13 RX ORDER — KETOROLAC TROMETHAMINE 30 MG/ML
30 INJECTION, SOLUTION INTRAMUSCULAR; INTRAVENOUS EVERY 6 HOURS PRN
Status: CANCELLED | OUTPATIENT
Start: 2024-03-13 | End: 2024-03-14

## 2024-03-13 RX ORDER — ACETAMINOPHEN 325 MG/1
650 TABLET ORAL EVERY 4 HOURS PRN
Status: DISCONTINUED | OUTPATIENT
Start: 2024-03-13 | End: 2024-03-18 | Stop reason: HOSPADM

## 2024-03-13 RX ORDER — FLUMAZENIL 0.1 MG/ML
0.2 INJECTION, SOLUTION INTRAVENOUS
Status: DISCONTINUED | OUTPATIENT
Start: 2024-03-13 | End: 2024-03-14 | Stop reason: HOSPADM

## 2024-03-13 RX ORDER — PROCHLORPERAZINE 25 MG
25 SUPPOSITORY, RECTAL RECTAL EVERY 12 HOURS PRN
Status: CANCELLED | OUTPATIENT
Start: 2024-03-13

## 2024-03-13 RX ORDER — METRONIDAZOLE 500 MG/100ML
500 INJECTION, SOLUTION INTRAVENOUS EVERY 8 HOURS
Status: CANCELLED | OUTPATIENT
Start: 2024-03-13

## 2024-03-13 RX ORDER — KETOROLAC TROMETHAMINE 30 MG/ML
30 INJECTION, SOLUTION INTRAMUSCULAR; INTRAVENOUS EVERY 6 HOURS PRN
Status: DISPENSED | OUTPATIENT
Start: 2024-03-13 | End: 2024-03-14

## 2024-03-13 RX ORDER — AMOXICILLIN 250 MG
2 CAPSULE ORAL 2 TIMES DAILY PRN
Status: CANCELLED | OUTPATIENT
Start: 2024-03-13

## 2024-03-13 RX ADMIN — HYDROMORPHONE HYDROCHLORIDE 0.5 MG: 1 INJECTION, SOLUTION INTRAMUSCULAR; INTRAVENOUS; SUBCUTANEOUS at 03:02

## 2024-03-13 RX ADMIN — OXYCODONE HYDROCHLORIDE 10 MG: 5 TABLET ORAL at 17:47

## 2024-03-13 RX ADMIN — ACETAMINOPHEN 650 MG: 325 TABLET, FILM COATED ORAL at 17:47

## 2024-03-13 RX ADMIN — CEFEPIME 2 G: 2 INJECTION, POWDER, FOR SOLUTION INTRAVENOUS at 16:03

## 2024-03-13 RX ADMIN — INSULIN ASPART 2 UNITS: 100 INJECTION, SOLUTION INTRAVENOUS; SUBCUTANEOUS at 17:48

## 2024-03-13 RX ADMIN — ACETAMINOPHEN 650 MG: 325 TABLET, FILM COATED ORAL at 08:45

## 2024-03-13 RX ADMIN — OXYCODONE HYDROCHLORIDE 10 MG: 5 TABLET ORAL at 08:44

## 2024-03-13 RX ADMIN — HYDROMORPHONE HYDROCHLORIDE 0.5 MG: 1 INJECTION, SOLUTION INTRAMUSCULAR; INTRAVENOUS; SUBCUTANEOUS at 05:05

## 2024-03-13 RX ADMIN — FENTANYL CITRATE 50 MCG: 50 INJECTION, SOLUTION INTRAMUSCULAR; INTRAVENOUS at 12:06

## 2024-03-13 RX ADMIN — KETOROLAC TROMETHAMINE 30 MG: 30 INJECTION, SOLUTION INTRAMUSCULAR at 07:18

## 2024-03-13 RX ADMIN — HYDROMORPHONE HYDROCHLORIDE 0.5 MG: 1 INJECTION, SOLUTION INTRAMUSCULAR; INTRAVENOUS; SUBCUTANEOUS at 16:18

## 2024-03-13 RX ADMIN — HYDROMORPHONE HYDROCHLORIDE 0.5 MG: 1 INJECTION, SOLUTION INTRAMUSCULAR; INTRAVENOUS; SUBCUTANEOUS at 07:49

## 2024-03-13 RX ADMIN — CEFEPIME 2 G: 2 INJECTION, POWDER, FOR SOLUTION INTRAVENOUS at 22:53

## 2024-03-13 RX ADMIN — CEFEPIME 2 G: 2 INJECTION, POWDER, FOR SOLUTION INTRAVENOUS at 05:05

## 2024-03-13 RX ADMIN — FENTANYL CITRATE 50 MCG: 50 INJECTION, SOLUTION INTRAMUSCULAR; INTRAVENOUS at 12:20

## 2024-03-13 RX ADMIN — MIDAZOLAM HYDROCHLORIDE 2 MG: 1 INJECTION, SOLUTION INTRAMUSCULAR; INTRAVENOUS at 12:04

## 2024-03-13 RX ADMIN — METRONIDAZOLE 500 MG: 500 INJECTION, SOLUTION INTRAVENOUS at 08:44

## 2024-03-13 RX ADMIN — METRONIDAZOLE 500 MG: 500 INJECTION, SOLUTION INTRAVENOUS at 00:49

## 2024-03-13 RX ADMIN — HYDROMORPHONE HYDROCHLORIDE 0.5 MG: 1 INJECTION, SOLUTION INTRAMUSCULAR; INTRAVENOUS; SUBCUTANEOUS at 00:49

## 2024-03-13 RX ADMIN — FENTANYL CITRATE 50 MCG: 50 INJECTION, SOLUTION INTRAMUSCULAR; INTRAVENOUS at 14:03

## 2024-03-13 RX ADMIN — KETOROLAC TROMETHAMINE 30 MG: 30 INJECTION, SOLUTION INTRAMUSCULAR; INTRAVENOUS at 18:03

## 2024-03-13 RX ADMIN — METRONIDAZOLE 500 MG: 500 INJECTION, SOLUTION INTRAVENOUS at 17:49

## 2024-03-13 ASSESSMENT — ACTIVITIES OF DAILY LIVING (ADL)
ADLS_ACUITY_SCORE: 21
ADLS_ACUITY_SCORE: 18
ADLS_ACUITY_SCORE: 18
ADLS_ACUITY_SCORE: 21
ADLS_ACUITY_SCORE: 18
ADLS_ACUITY_SCORE: 21
ADLS_ACUITY_SCORE: 38
ADLS_ACUITY_SCORE: 21
ADLS_ACUITY_SCORE: 18

## 2024-03-13 NOTE — PRE-PROCEDURE
GENERAL PRE-PROCEDURE:   Procedure:  CT guided abdominal drain placement under moderate sedation  Date/Time:  3/13/2024 11:23 AM    Written consent obtained?: Yes    Risks and benefits: Risks, benefits and alternatives were discussed    Consent given by:  Patient  Patient states understanding of procedure being performed: Yes    Patient's understanding of procedure matches consent: Yes    Procedure consent matches procedure scheduled: Yes    Expected level of sedation:  Moderate  Appropriately NPO:  Yes  ASA Class:  1  Mallampati  :  Grade 2- soft palate, base of uvula, tonsillar pillars, and portion of posterior pharyngeal wall visible  Lungs:  Lungs clear with good breath sounds bilaterally  Heart:  Normal heart sounds and rate  History & Physical reviewed:  History and physical reviewed and no updates needed  Statement of review:  I have reviewed the lab findings, diagnostic data, medications, and the plan for sedation

## 2024-03-13 NOTE — DISCHARGE SUMMARY
[facility] Medicine Progress Note  Date of Service: 03/13/2024    Assessment & Plan   Mynor Rollins is a 23 year old male with past medical hx of T2DM and obesity who presents on 3/8/2024 with 1d hx of LLQ pain. Ct imaging revealing possible diverticular abscess and patient admitted for IV antibiotics and surgical evaluation.      # Diverticular abscess vs other colonic wall thickening/inflammation   # Leukocytosis    Presented with 1 day hx of LLQ pain with N/V/D, chills, fevers. CT abdomen on 3/8 showed 3.4 cm possible abcess. WBC 14.7. Lipase WNL. Tmax 100. Pulse 110s. With question of abscess and size less than 4 cm didn't initially pursue drainage and followed clinically while providing IV antibiotics.     Has persistent leukocytosis. Repeat CT scan 3/12 showed enlarging LLQ pericolonic fluid/gas collection most c/w abscess. Gen surg spoke with IR at [place] who stated they will drain the abscess on 3/13. He will transfer there and return to [facility].    - Gen surg consulted, following, appreciate recommendations   - Transfer to [place] for IR drainage 3/13 am, and return 3/13 pm  - Continued IV cefepime and metronidazole   - Pain control with PO oxycodone and IV hydromorphone for breakthrough  - NPO prior to procedure     # Type 2 diabetes mellitus (H)  Initially diagnosed in 2020 when he presented with DKA and A1c was 11.3.  A1c on admission 9.0. Some question of T1DM vs T2DM. Treated as T2DM outpatient and patient not using insulin at home and only on Ozempic. BS on admission 206.   - Sliding scale insulin     # Elevated anion gap without acidosis, resolved  # Ketonemia, improved   Anion gap 16. Ketones 1.6 ?  2.1. VBG showed pH 7.39. CO2 38. Bicarb 23. Ketones likely elevated from decreased PO intake.               Diet: NPO per Anesthesia Guidelines for Procedure/Surgery Except for: No Exceptions    DVT Prophylaxis: Ambulate every shift  Damico Catheter: Not  present  Lines: None     Code Status: Full Code       Discussion: transferring to [place] for drainage of fluid collection in LLQ and will transfer back to [facility] for continued IV abx.     Disposition: Anticipate discharge 3/15     Attestation:  I have reviewed today's vital signs, notes, medications, labs and imaging.    I have discussed, or will be discussing, the patient with hospitalist physician, Dr. Sydni Mckeon, PA-C       Interval History   NAEO. Still getting chills. No n/v/d. Has persistent LLQ pain radiating to back. He's aware of the plan to transfer to [place] for drainage.     Physical Exam   Temp:  [98.1  F (36.7  C)-99  F (37.2  C)] 99  F (37.2  C)  Pulse:  [103-118] 118  Resp:  [17-18] 18  BP: (112-159)/(65-98) 159/98  SpO2:  [95 %-97 %] 96 %    Weights:   Vitals:    03/08/24 1647 03/08/24 2124   Weight: 138.3 kg (305 lb) 139 kg (306 lb 7 oz)    Body mass index is 48 kg/m .    Constitutional: Alert, oriented, cooperative, in no acute distress, appears nontoxic.  HENT: Oropharynx is clear and moist. No evidence of cranial trauma. Normocephalic.   Cardiovascular: Regular rate and rhythm, normal S1 and S2, and no murmur noted.   Respiratory: Clear to auscultation bilaterally with no adventitious breath sounds.   GI: Soft, normal bowel sounds, no hepatomegaly, no masses.  Musculoskeletal: Normal muscle bulk and tone. FROM in all extremities   Skin: Diaphoretic  Neurologic: Cranial nerves 2-12 are grossly intact.       Data   Recent Labs   Lab 03/13/24  0741 03/13/24  0215 03/12/24  2202 03/12/24  0741 03/12/24  0534 03/11/24  0717 03/11/24  0551 03/10/24  1117 03/10/24  0837 03/09/24  0804 03/09/24  0532 03/08/24  2224 03/08/24  1716   WBC  --   --   --   --  12.6*  --  13.0*  --  13.6*  --  13.5*  --  14.7*   HGB  --   --   --   --  13.0*  --  13.3  --  13.6  --  13.5  --  15.7   MCV  --   --   --   --  85  --  85  --  86  --  87  --  84   PLT  --   --   --   --  345  --  319  --  299  --   291  --  340   NA  --   --   --   --  136  --  137  --  133*  --  139  --  136   POTASSIUM  --   --   --   --  3.7  --  3.5  --  3.4  --  4.1   < > 3.5   CHLORIDE  --   --   --   --  99  --  99  --  98  --  102  --  99   CO2  --   --   --   --  26  --  28  --  22  --  23  --  21*   BUN  --   --   --   --  5.8*  --  5.4*  --  4.5*  --  5.1*  --  7.4   CR  --   --   --   --  0.67  --  0.67  --  0.59*  --  0.70  --  0.63*   ANIONGAP  --   --   --   --  11  --  10  --  13  --  14  --  16*   CAROL  --   --   --   --  9.0  --  9.0  --  9.2  --  8.9  --  9.4   * 128* 153*   < > 130*   < > 130*   < > 191*   < > 199*   < > 206*   ALBUMIN  --   --   --   --   --   --   --   --   --   --  4.1  --  4.6   PROTTOTAL  --   --   --   --   --   --   --   --   --   --  7.0  --  8.1   BILITOTAL  --   --   --   --   --   --   --   --   --   --  0.9  --  0.8   ALKPHOS  --   --   --   --   --   --   --   --   --   --  70  --  79   ALT  --   --   --   --   --   --   --   --   --   --  59  --  81*   AST  --   --   --   --   --   --   --   --   --   --  21  --  33   LIPASE  --   --   --   --   --   --   --   --   --   --   --   --  23    < > = values in this interval not displayed.       Recent Labs   Lab 03/13/24  0741 03/13/24  0215 03/12/24  2202 03/12/24  1652 03/12/24  1143 03/12/24  0741   * 128* 153* 142* 129* 113*        Unresulted Labs Ordered in the Past 30 Days of this Admission       No orders found from 2/7/2024 to 3/9/2024.             Imaging  No results found for this or any previous visit (from the past 24 hour(s)).     I reviewed all new labs and imaging results over the last 24 hours. I personally reviewed no images or EKG's today.    Medications      ceFEPIme  2 g Intravenous Q8H    insulin aspart  1-7 Units Subcutaneous TID AC    insulin aspart  1-5 Units Subcutaneous At Bedtime    metroNIDAZOLE  500 mg Intravenous Q8H    Semaglutide (1 MG/DOSE)  1 mg Subcutaneous Q7 Days    sodium chloride (PF)  3 mL  Intracatheter Q8H     Junior Mckeon PA-C

## 2024-03-13 NOTE — PROCEDURES
[facility]    Procedure: IR Procedure Note    Date/Time: 3/13/2024 12:38 PM    Performed by: Zack Whittaker MD  Authorized by: Zack Whittaker MD      UNIVERSAL PROTOCOL   Site Marked: Yes  Prior Images Obtained and Reviewed:  Yes  Required items: Required blood products, implants, devices and special equipment available    Patient identity confirmed:  Verbally with patient, arm band, provided demographic data and hospital-assigned identification number  Patient was reevaluated immediately before administering moderate or deep sedation or anesthesia  Confirmation Checklist:  Patient's identity using two indicators, relevant allergies, procedure was appropriate and matched the consent or emergent situation and correct equipment/implants were available  Time out: Immediately prior to the procedure a time out was called    Universal Protocol: the Joint Commission Universal Protocol was followed    Preparation: Patient was prepped and draped in usual sterile fashion    ESBL (mL):  0     ANESTHESIA    Anesthesia:  Local infiltration  Local Anesthetic:  Lidocaine 1% without epinephrine  Anesthetic Total (mL):  12      SEDATION  Patient Sedated: Yes    Sedation:  Fentanyl, midazolam and see MAR for details  Vital signs: Vital signs monitored during sedation    See dictated procedure note for full details.  Findings: Successful placement of 12 FR APD into left abdominal abscess with aspiration of ~100 mL of brown/purulent fluid.     Specimens: fluid and/or tissue for laboratory analysis and culture    Complications: None    Condition: Stable    Plan: Routine post-drain placement cares.       PROCEDURE    Patient Tolerance:  Patient tolerated the procedure well with no immediate complications  Length of time physician/provider present for 1:1 monitoring during sedation: 30

## 2024-03-13 NOTE — IR NOTE
Patient Name: Mynor Rollins  Medical Record Number: [id number]  Today's Date: 3/13/2024    Procedure: Abscess drain  Proceduralist: Dr. Whittaker      Sedation medications administered: 2 mg midazolam and 150 mcg fentanyl   Sedation time: 35 minutes    Report given to: Yuliya Mann

## 2024-03-13 NOTE — PLAN OF CARE
Goal Outcome Evaluation:      Plan of Care Reviewed With: patient    Overall Patient Progress: no change  Problem: Adult Inpatient Plan of Care  Goal: Absence of Hospital-Acquired Illness or Injury  Intervention: Identify and Manage Fall Risk  Recent Flowsheet Documentation  Taken 3/12/2024 2300 by Maria Eugenia Rodriguez RN  Safety Promotion/Fall Prevention:   assistive device/personal items within reach   clutter free environment maintained   nonskid shoes/slippers when out of bed   patient and family education     Problem: Adult Inpatient Plan of Care  Goal: Absence of Hospital-Acquired Illness or Injury  Intervention: Prevent Skin Injury  Recent Flowsheet Documentation  Taken 3/12/2024 2300 by Maria Eugenia Rodriugez RN  Body Position: position changed independently  Taken 3/12/2024 2134 by Maria Eugenia Rodriguez RN  Body Position: position changed independently     Problem: Adult Inpatient Plan of Care  Goal: Absence of Hospital-Acquired Illness or Injury  Intervention: Prevent Infection  Recent Flowsheet Documentation  Taken 3/12/2024 2300 by Maria Eugenia Rodriguez RN  Infection Prevention: rest/sleep promoted     Problem: Adult Inpatient Plan of Care  Goal: Optimal Comfort and Wellbeing  Outcome: Progressing  Intervention: Monitor Pain and Promote Comfort  Recent Flowsheet Documentation  Taken 3/13/2024 0505 by Maria Eugenia Rodriguez RN  Pain Management Interventions: medication (see MAR)  Taken 3/13/2024 0316 by Maria Eugenia Rodriguez RN  Pain Management Interventions: medication (see MAR)  Taken 3/13/2024 0302 by Maria Eugenia Rodriguez RN  Pain Management Interventions: medication (see MAR)  Taken 3/13/2024 0105 by Maria Eugenia Rodriguez RN  Pain Management Interventions: medication (see MAR)  Taken 3/13/2024 0049 by Maria Eugenia Rodriguez RN  Pain Management Interventions: medication (see MAR)  Taken 3/12/2024 2349 by Maria Eugenia Rodriguez RN  Pain Management Interventions: medication (see MAR)  Taken 3/12/2024 2134 by Maria Eugenia Rodriguez RN  Pain Management  Interventions: medication (see MAR)  Taken 3/12/2024 1952 by Maria Eugenia Rodriguez, RN  Pain Management Interventions: medication (see MAR)     Problem: Pain Acute  Goal: Optimal Pain Control and Function  Intervention: Optimize Psychosocial Wellbeing  Recent Flowsheet Documentation  Taken 3/12/2024 2300 by Maria Eugenia Rodriguez, RN  Supportive Measures:   positive reinforcement provided   self-care encouraged        Pain is controlled with current interventions. NPO. Plan to have abscess drainage today at [facility] and return here. Continue to assess per plan and update care team as needed.        confused state/not tested due to decreased cognition/not tested due to

## 2024-03-13 NOTE — DISCHARGE INSTRUCTIONS
Drain Placement Discharge Instructions:  You had a small tube or drain(s) placed. This was placed so the abnormal fluid collection within your body can be drained out (externally). Sometimes drains must stay in place for weeks to months. Please follow the below instructions as you recover:    Care Instructions after drain placement:  - Rest after your drain was placed. Avoid strenuous activity and heavy lifting for the next 2 days. Return to your normal activities as you tolerate after the 2 day restriction.  - You may shower; however, you should not submerge site under water like in a tub bath, Jacuzzi or pool. Keep drain exit site covered with plastic wrap and tape when showering.  - Keep dressing clean and dry as long as drain is in place. If you have a gauze dressing, please change the dressing daily and as needed to keep site clean and dry.  - You may eat and drink as normal.  - You may have discomfort after the procedure near the drain exit site. You may take acetaminophen (Tylenol ) or ibuprofen (Motrin ) as needed for any discomfort.  - Protect the drain from being pulled out or dislodged.  - Inspect the tube often for kinks.  - If you have a gravity bag, keep the bag below the drain exit site to allow for free flow of drainage by gravity.  - Flush your drainage tube with 10mL sterile normal saline daily.  - Record your daily drain outputs and amount flushed on your drainage record. Bring your records to your next radiology appointment. Often drains will need to stay in place until the drainage output is less than about 15mL a day for 2-3 days in a row.  - Empty your drainage bag/bulb daily or when it is approximately half way fluid. Follow below instructions for emptying your bag/bulb:       - Clean hands well with soap and water.       - Place a measuring container near the outlet valve of the drainage bag/bulb.       - If you have a drainage BAG: Twist the blue valve at the bottom of the bag while holding  "the valve over your measuring cup and open container to empty. Re-twist the valve closed on the drainage bag once complete.       - If you have a drainage BULB: Open the bulb cap (at the top of the bulb). Empty the fluid into the measuring cup. Squeeze bulb and hold flat. While bulb is squeezed, close the cap.       - After draining fluid record your drainage output.        - Discard drainage into toilet once fluid drained.    Flushing Your Drainage Tube:   If you have a 3 way stop cock:     1. Collect flushing supplies: 10mL of sterile normal saline in syringe, alcohol pad.  2. Clean the flushing (center) port with alcohol and attach the flushing syringe by twisting into place (clockwise).  3. Turn the 3 way stopcock valve \"off\" to the drainage bag/bulb. (Valve should be pointed toward the bag/bulb)  4. Gently inject/flush the drain so fluid is moving towards your body through the drainage catheter.   5. Turn the stopcock valve back to its center position to allow for drainage to resume.   6. Remove flushing syringe from flushing port by untwisting the syringe (counter clockwise).  7. Squeeze bulb or accordion to reapply suction if you have one.  8. Record the output from your drain and flush amount on your drainage record.     If you have a Y flushing port / UreSil Flush Adapter:    1. Collect flushing supplies: 10mL of sterile normal saline in syringe, alcohol pad.  2. Clamp off the tubing to the drain by pinching the white clamp closed. Clean the drain port with an alcohol wipe.   3. Attach the saline syringe to the drain port.   4. Twist the syringe (clockwise) to tighten it to the port   5. Flush sterile normal saline from the syringe into the drain. The saline should flow toward your body not toward the drainage bag.   6. Untwist the syringe (counter clockwise) and remove it.   7. Unclamp the white clamp making sure the drainage is able to flow freely into the bag.   8. Squeeze bulb or accordion to reapply " suction if you have one.  9. Record the output from your drain and flush amount on your drainage record.    Follow-Up:   - Please contact [facility] Outpatient Scheduling at [phone].    Please seek medical evaluation for:  - Nausea and/or vomiting   - Diffuse abdominal pain  - Fevers (greater than 101 F)  - Drainage tube falls out, is pulled back or felt to be out of position.     Call [facility] RN Line at [phone] with tube related questions or concerns:  - Unable to flush drainage tube.   - Leakage (or purulent drainage) around drainage tube site.   - Extreme pain at drainage tube site.   - Outputs suddenly stop or significantly reduces.   - Warmth, redness, swelling or tenderness around the drainage tube

## 2024-03-13 NOTE — PROGRESS NOTES
[facility] TRANSPORT NOTE  Data:   Reason for Transport:  Procedure     Mynor Rollins was transported to [facility] via ambulance at 0940.  Patient was accompanied by [facility]. Equipment used for transport: None. Family was aware of reason for transport: yes    Action:  Report: given to [place] [id number]    Response:  Patient's condition when transferred off unit was stable.    Tatum An RN

## 2024-03-14 ENCOUNTER — APPOINTMENT (OUTPATIENT)
Dept: CT IMAGING | Facility: CLINIC | Age: 24
End: 2024-03-14
Attending: INTERNAL MEDICINE
Payer: MEDICAID

## 2024-03-14 DIAGNOSIS — K57.20 DIVERTICULITIS OF LARGE INTESTINE WITH ABSCESS WITHOUT BLEEDING: Primary | ICD-10-CM

## 2024-03-14 LAB
ANION GAP SERPL CALCULATED.3IONS-SCNC: 13 MMOL/L (ref 7–15)
BUN SERPL-MCNC: 7.2 MG/DL (ref 6–20)
CALCIUM SERPL-MCNC: 8.9 MG/DL (ref 8.6–10)
CHLORIDE SERPL-SCNC: 97 MMOL/L (ref 98–107)
CREAT SERPL-MCNC: 0.66 MG/DL (ref 0.67–1.17)
CRP SERPL-MCNC: 208.37 MG/L
DEPRECATED HCO3 PLAS-SCNC: 22 MMOL/L (ref 22–29)
EGFRCR SERPLBLD CKD-EPI 2021: >90 ML/MIN/1.73M2
ERYTHROCYTE [DISTWIDTH] IN BLOOD BY AUTOMATED COUNT: 11.6 % (ref 10–15)
GLUCOSE BLDC GLUCOMTR-MCNC: 132 MG/DL (ref 70–99)
GLUCOSE BLDC GLUCOMTR-MCNC: 148 MG/DL (ref 70–99)
GLUCOSE BLDC GLUCOMTR-MCNC: 156 MG/DL (ref 70–99)
GLUCOSE BLDC GLUCOMTR-MCNC: 164 MG/DL (ref 70–99)
GLUCOSE BLDC GLUCOMTR-MCNC: 165 MG/DL (ref 70–99)
GLUCOSE BLDC GLUCOMTR-MCNC: 166 MG/DL (ref 70–99)
GLUCOSE SERPL-MCNC: 171 MG/DL (ref 70–99)
HCT VFR BLD AUTO: 42.9 % (ref 40–53)
HGB BLD-MCNC: 13.8 G/DL (ref 13.3–17.7)
MCH RBC QN AUTO: 27.9 PG (ref 26.5–33)
MCHC RBC AUTO-ENTMCNC: 32.2 G/DL (ref 31.5–36.5)
MCV RBC AUTO: 87 FL (ref 78–100)
PLATELET # BLD AUTO: 431 10E3/UL (ref 150–450)
POTASSIUM SERPL-SCNC: 3.5 MMOL/L (ref 3.4–5.3)
RBC # BLD AUTO: 4.95 10E6/UL (ref 4.4–5.9)
SODIUM SERPL-SCNC: 132 MMOL/L (ref 135–145)
WBC # BLD AUTO: 14.5 10E3/UL (ref 4–11)

## 2024-03-14 PROCEDURE — 120N000001 HC R&B MED SURG/OB

## 2024-03-14 PROCEDURE — 99232 SBSQ HOSP IP/OBS MODERATE 35: CPT | Performed by: INTERNAL MEDICINE

## 2024-03-14 PROCEDURE — 36415 COLL VENOUS BLD VENIPUNCTURE: CPT

## 2024-03-14 PROCEDURE — 258N000003 HC RX IP 258 OP 636: Performed by: INTERNAL MEDICINE

## 2024-03-14 PROCEDURE — 74177 CT ABD & PELVIS W/CONTRAST: CPT

## 2024-03-14 PROCEDURE — 250N000013 HC RX MED GY IP 250 OP 250 PS 637

## 2024-03-14 PROCEDURE — 82962 GLUCOSE BLOOD TEST: CPT

## 2024-03-14 PROCEDURE — 86140 C-REACTIVE PROTEIN: CPT

## 2024-03-14 PROCEDURE — 250N000011 HC RX IP 250 OP 636: Performed by: INTERNAL MEDICINE

## 2024-03-14 PROCEDURE — 250N000011 HC RX IP 250 OP 636

## 2024-03-14 PROCEDURE — 250N000009 HC RX 250: Performed by: INTERNAL MEDICINE

## 2024-03-14 PROCEDURE — 85027 COMPLETE CBC AUTOMATED: CPT

## 2024-03-14 PROCEDURE — 250N000011 HC RX IP 250 OP 636: Mod: JZ | Performed by: INTERNAL MEDICINE

## 2024-03-14 PROCEDURE — 80048 BASIC METABOLIC PNL TOTAL CA: CPT

## 2024-03-14 RX ORDER — IOPAMIDOL 755 MG/ML
100 INJECTION, SOLUTION INTRAVASCULAR ONCE
Status: COMPLETED | OUTPATIENT
Start: 2024-03-14 | End: 2024-03-14

## 2024-03-14 RX ORDER — CLINDAMYCIN PHOSPHATE 900 MG/50ML
900 INJECTION, SOLUTION INTRAVENOUS EVERY 8 HOURS
Status: DISCONTINUED | OUTPATIENT
Start: 2024-03-14 | End: 2024-03-15

## 2024-03-14 RX ORDER — SODIUM CHLORIDE 9 MG/ML
INJECTION, SOLUTION INTRAVENOUS CONTINUOUS
Status: DISCONTINUED | OUTPATIENT
Start: 2024-03-14 | End: 2024-03-18 | Stop reason: HOSPADM

## 2024-03-14 RX ADMIN — HYDROMORPHONE HYDROCHLORIDE 0.5 MG: 1 INJECTION, SOLUTION INTRAMUSCULAR; INTRAVENOUS; SUBCUTANEOUS at 04:36

## 2024-03-14 RX ADMIN — SODIUM CHLORIDE 4 MILLION UNITS: 9 INJECTION, SOLUTION INTRAVENOUS at 20:00

## 2024-03-14 RX ADMIN — KETOROLAC TROMETHAMINE 30 MG: 30 INJECTION, SOLUTION INTRAMUSCULAR; INTRAVENOUS at 07:48

## 2024-03-14 RX ADMIN — METRONIDAZOLE 500 MG: 500 INJECTION, SOLUTION INTRAVENOUS at 08:41

## 2024-03-14 RX ADMIN — ACETAMINOPHEN 650 MG: 325 TABLET, FILM COATED ORAL at 07:47

## 2024-03-14 RX ADMIN — INSULIN ASPART 1 UNITS: 100 INJECTION, SOLUTION INTRAVENOUS; SUBCUTANEOUS at 07:56

## 2024-03-14 RX ADMIN — INSULIN ASPART 1 UNITS: 100 INJECTION, SOLUTION INTRAVENOUS; SUBCUTANEOUS at 11:46

## 2024-03-14 RX ADMIN — SODIUM CHLORIDE 74 ML: 9 INJECTION, SOLUTION INTRAVENOUS at 15:31

## 2024-03-14 RX ADMIN — OXYCODONE HYDROCHLORIDE 10 MG: 5 TABLET ORAL at 07:47

## 2024-03-14 RX ADMIN — CLINDAMYCIN PHOSPHATE 900 MG: 900 INJECTION, SOLUTION INTRAVENOUS at 22:58

## 2024-03-14 RX ADMIN — OXYCODONE HYDROCHLORIDE 10 MG: 5 TABLET ORAL at 02:21

## 2024-03-14 RX ADMIN — OXYCODONE HYDROCHLORIDE 10 MG: 5 TABLET ORAL at 14:36

## 2024-03-14 RX ADMIN — KETOROLAC TROMETHAMINE 30 MG: 30 INJECTION, SOLUTION INTRAMUSCULAR; INTRAVENOUS at 14:36

## 2024-03-14 RX ADMIN — CLINDAMYCIN PHOSPHATE 900 MG: 900 INJECTION, SOLUTION INTRAVENOUS at 14:40

## 2024-03-14 RX ADMIN — OXYCODONE HYDROCHLORIDE 10 MG: 5 TABLET ORAL at 18:59

## 2024-03-14 RX ADMIN — SODIUM CHLORIDE 1000 ML: 9 INJECTION, SOLUTION INTRAVENOUS at 09:43

## 2024-03-14 RX ADMIN — HYDROMORPHONE HYDROCHLORIDE 0.5 MG: 1 INJECTION, SOLUTION INTRAMUSCULAR; INTRAVENOUS; SUBCUTANEOUS at 20:03

## 2024-03-14 RX ADMIN — IOPAMIDOL 100 ML: 755 INJECTION, SOLUTION INTRAVENOUS at 15:30

## 2024-03-14 RX ADMIN — SODIUM CHLORIDE: 9 INJECTION, SOLUTION INTRAVENOUS at 15:16

## 2024-03-14 RX ADMIN — METRONIDAZOLE 500 MG: 500 INJECTION, SOLUTION INTRAVENOUS at 00:45

## 2024-03-14 RX ADMIN — INSULIN ASPART 1 UNITS: 100 INJECTION, SOLUTION INTRAVENOUS; SUBCUTANEOUS at 16:53

## 2024-03-14 RX ADMIN — CEFEPIME 2 G: 2 INJECTION, POWDER, FOR SOLUTION INTRAVENOUS at 07:48

## 2024-03-14 RX ADMIN — SODIUM CHLORIDE 4 MILLION UNITS: 9 INJECTION, SOLUTION INTRAVENOUS at 15:16

## 2024-03-14 ASSESSMENT — ACTIVITIES OF DAILY LIVING (ADL)
ADLS_ACUITY_SCORE: 22
ADLS_ACUITY_SCORE: 21
ADLS_ACUITY_SCORE: 22
ADLS_ACUITY_SCORE: 21
ADLS_ACUITY_SCORE: 22
ADLS_ACUITY_SCORE: 21
ADLS_ACUITY_SCORE: 22
ADLS_ACUITY_SCORE: 21
ADLS_ACUITY_SCORE: 22
ADLS_ACUITY_SCORE: 21

## 2024-03-14 NOTE — PLAN OF CARE
Problem: Infection  Goal: Absence of Infection Signs and Symptoms  Outcome: Progressing     Problem: Pain Acute  Goal: Optimal Pain Control and Function  Outcome: Progressing  Intervention: Develop Pain Management Plan  Recent Flowsheet Documentation  Taken 3/14/2024 0221 by Carla Concepcion RN  Pain Management Interventions: medication (see MAR)  Intervention: Prevent or Manage Pain  Recent Flowsheet Documentation  Taken 3/13/2024 2252 by Carla Concepcion, RN  Medication Review/Management: medications reviewed  Intervention: Optimize Psychosocial Wellbeing  Recent Flowsheet Documentation  Taken 3/13/2024 2252 by Carla Concepcion, RN  Diversional Activities: television   Goal Outcome Evaluation: Pt afebrile. Ambulated to BR, had medium loose BM. Pt states abdominal discomfort under better control tonight, mostly hurts w/ movement. Using prn oxycodone & dilaudid. Lung sounds diminished, given IS & pt able to pull it to 3000 w/ proper technique. SURENDRA draining brown colored drainage, irrigated once w/ 10cc NS.

## 2024-03-14 NOTE — PROGRESS NOTES
GENERAL SURGERY Progress Note    Admission Date: 3/8/2024  3/13/2024         Assessment and Plan:     Mynor Rollins is a 23 year old male with abdominal pain, pelvic abscess, Type 2 DM. Found to have pelvic abscess, likely 2/2 to complicated diverticulitis. Initially too small for percutaneous drainage, was treated with IV antibiotics however repeat cross-sectional imaging shows that abscess growing in size.     - Patient was seen evaluated early this morning, plan was to have IR place percutaneous drain in abscess. This was successfully completed at [facility]  - Ok for clear liquid diet  - Monitor drain output  - Await drain cultures  - Continue IV antibiotics  - Surgery team will continue to follow             Interval History:     No acute events overnight. Patient was feeling well this morning prior to transfer to [facility] for IR drainage.                      Physical Exam:   Blood pressure 112/63, pulse 118, temperature 98  F (36.7  C), temperature source Oral, resp. rate 16, SpO2 95%.  Temperature Temp  Av.6  F (37  C)  Min: 98  F (36.7  C)  Max: 99.1  F (37.3  C)   No intake/output data recorded.    Constitutional- No acute distress, well nourished, non-toxic  Respiratory- nonlabored breathing on room air  Cardiovascular - Heart RRR  Abdomen - Soft, nontender, nondistended. No palpable masses or organomegaly. Morbidly obese abdomen. No tenderness to percussion or palpation. No tenderness on exam, only tenderness elicited by patient if prompted.   Skin: Warm, Dry         Data:     Recent Labs   Lab Test 24  0534 24  0551 03/10/24  0837   WBC 12.6* 13.0* 13.6*   HGB 13.0* 13.3 13.6   HCT 39.4* 39.1* 40.9    319 299      Recent Labs   Lab Test 24  0534 24  0551 03/10/24  0837    137 133*   POTASSIUM 3.7 3.5 3.4   CHLORIDE 99 99 98   CO2 26 28 22   BUN 5.8* 5.4* 4.5*   CR 0.67 0.67 0.59*     Recent Labs   Lab Test 24  0532 24  1716  06/17/20  2315   BILITOTAL 0.9 0.8 0.7   ALT 59 81* 96*   AST 21 33 24   ALKPHOS 70 79 145   LIPASE  --  23  --       Recent Labs   Lab Test 03/13/24  1020   INR 1.20*     Recent Labs   Lab Test 03/12/24  0534 03/11/24  0551 03/10/24  0837 03/09/24  0532 03/08/24  2224 06/18/20  0815 06/18/20  0610 06/18/20  0207 06/17/20  2315 06/17/20  1704   CAROL 9.0 9.0 9.2   < >  --    < > 8.2*   < > 8.5 9.4   PHOS  --   --   --   --  3.1  --  1.4*  --  1.5* 2.9   MAG  --   --   --   --  1.7  --   --   --  2.3 2.7*    < > = values in this interval not displayed.     Recent Labs   Lab Test 03/12/24  0534 03/11/24  0551 03/10/24  0837 03/09/24  0532 03/08/24  1716 06/18/20  0207 06/17/20  2330 06/17/20  2315   ANIONGAP 11 10 13 14 16*   < >  --  21*   PROTEIN  --   --   --   --   --   --  100*  --    ALBUMIN  --   --   --  4.1 4.6  --   --  4.7    < > = values in this interval not displayed.       Harrison Garcia MD on 3/13/2024 at 9:19 PM

## 2024-03-14 NOTE — PROGRESS NOTES
CT ABD-IMPRESSION:   1.  Interval decrease in size of a previously seen left lower quadrant  abscess. Left lower quadrant percutaneous drainage catheter terminates  anterior to a small residual gas and high density collection. No  residual drainable fluid collection is seen in this region at this  time. Previously described possible sinus tract between the residual  collection and adjacent colon appears unchanged.  2.  New fluid collection in the central low pelvis with thin  peripheral enhancement and no internal gas, suspicious for a  developing abscess.  3.  Mild to moderate fat stranding and edema in the low abdomen and  pelvis, likely reactive.  4.  Mild wall thickening of the distal descending and rectosigmoid  colon, nonspecific. No signs of bowel obstruction.  5.  Hepatic steatosis.     Will continue current rx

## 2024-03-14 NOTE — DISCHARGE INSTRUCTIONS
Drain Placement Discharge Instructions:  You had a small tube or drain(s) placed. This was placed so the abnormal fluid collection within your body can be drained out (externally). Sometimes drains must stay in place for weeks to months. Please follow the below instructions as you recover:    Care Instructions after drain placement:  - Rest after your drain was placed. Avoid strenuous activity and heavy lifting for the next 2 days. Return to your normal activities as you tolerate after the 2 day restriction.  - You may shower; however, you should not submerge site under water like in a tub bath, Jacuzzi or pool. Keep drain exit site covered with plastic wrap and tape when showering.  - Keep dressing clean and dry as long as drain is in place. If you have a gauze dressing, please change the dressing daily and as needed to keep site clean and dry.  - You may eat and drink as normal.  - You may have discomfort after the procedure near the drain exit site. You may take acetaminophen (Tylenol ) or ibuprofen (Motrin ) as needed for any discomfort.  - Protect the drain from being pulled out or dislodged.  - Inspect the tube often for kinks.  - If you have a gravity bag, keep the bag below the drain exit site to allow for free flow of drainage by gravity.  - Flush your drainage tube with 1010mL sterile normal saline daily.  - Record your daily drain outputs and amount flushed on your drainage record. Bring your records to your next radiology appointment. Often drains will need to stay in place until the drainage output is less than about 15mL a day for 2-3 days in a row.  - Empty your drainage bag/bulb daily or when it is approximately half way fluid. Follow below instructions for emptying your bag/bulb:       - Clean hands well with soap and water.       - Place a measuring container near the outlet valve of the drainage bag/bulb.       - If you have a drainage BAG: Twist the blue valve at the bottom of the bag while  "holding the valve over your measuring cup and open container to empty. Re-twist the valve closed on the drainage bag once complete.       - If you have a drainage BULB: Open the bulb cap (at the top of the bulb). Empty the fluid into the measuring cup. Squeeze bulb and hold flat. While bulb is squeezed, close the cap.       - After draining fluid record your drainage output.        - Discard drainage into toilet once fluid drained.    Flushing Your Drainage Tube:   If you have a 3 way stop cock:     1. Collect flushing supplies: 10mL of sterile normal saline in syringe, alcohol pad.  2. Clean the flushing (center) port with alcohol and attach the flushing syringe by twisting into place (clockwise).  3. Turn the 3 way stopcock valve \"off\" to the drainage bag/bulb. (Valve should be pointed toward the bag/bulb)  4. Gently inject/flush the drain so fluid is moving towards your body through the drainage catheter.   5. Turn the stopcock valve back to its center position to allow for drainage to resume.   6. Remove flushing syringe from flushing port by untwisting the syringe (counter clockwise).  7. Squeeze bulb or accordion to reapply suction if you have one.  8. Record the output from your drain and flush amount on your drainage record.     If you have a Y flushing port / UreSil Flush Adapter:    1. Collect flushing supplies: 10mL of sterile normal saline in syringe, alcohol pad.  2. Clamp off the tubing to the drain by pinching the white clamp closed. Clean the drain port with an alcohol wipe.   3. Attach the saline syringe to the drain port.   4. Twist the syringe (clockwise) to tighten it to the port   5. Flush sterile normal saline from the syringe into the drain. The saline should flow toward your body not toward the drainage bag.   6. Untwist the syringe (counter clockwise) and remove it.   7. Unclamp the white clamp making sure the drainage is able to flow freely into the bag.   8. Squeeze bulb or accordion to " reapply suction if you have one.  9. Record the output from your drain and flush amount on your drainage record.    Follow-Up:   - Please contact [facility] Scheduling at [phone].    Please seek medical evaluation for:  - Nausea and/or vomiting   - Diffuse abdominal pain  - Fevers (greater than 101 F)  - Drainage tube falls out, is pulled back or felt to be out of position.     Call [facility] RN Line at [phone] with tube related questions or concerns:  - Unable to flush drainage tube.   - Leakage (or purulent drainage) around drainage tube site.   - Extreme pain at drainage tube site.   - Outputs suddenly stop or significantly reduces.   - Warmth, redness, swelling or tenderness around the drainage tube

## 2024-03-14 NOTE — PROGRESS NOTES
GENERAL SURGERY Progress Note    Admission Date: 3/8/2024  3/14/2024         Assessment and Plan:     Mynor Rollins is a 23 year old male with abdominal pain, Type 2 DM. Found to have pelvic abscess, likely 2/2 to complicated diverticulitis. Initially too small for percutaneous drainage, was treated with IV antibiotics however repeat cross-sectional imaging shows that abscess growing in size. Underwent percutaneous drainage with interventional radiology at [facility] on 3/13/24. Patient with persistent leukocytosis, abdominal exam stable. Continues to pass gas and have bowel movements, tolerating PO liquids    - Monitor drain output quality and volume  - Suspect leukocytosis secondary to manipulation of abscess cavity and procedure from yesterday, continue to trend CBC and CRP  - Continue IV antibiotics, narrow based on cultures  - Ok to continue PO liquids, hold on advancing further today given his ongoing pain and leukocytosis  - Encourage ambulation and IS  - Ok for DVT chemoprophylaxis  - Surgery team will continue to follow             Interval History:     Underwent percutaneous drainage of intra-abdominal abscess yesterday. This morning, states he continues to have some pain in lower abdomen worse with movement. No fevers or chills, no nausea or emesis. Tolerating PO liquids. Last BM yesterday evening.                      Physical Exam:   Blood pressure 111/60, pulse (!) 128, temperature 98  F (36.7  C), temperature source Oral, resp. rate 16, SpO2 95%.  Temperature Temp  Av.6  F (37  C)  Min: 98  F (36.7  C)  Max: 99.1  F (37.3  C)   I/O last 3 completed shifts:  In: -   Out: 145 [Drains:145]    Constitutional- No acute distress, well nourished, non-toxic  Respiratory- nonlabored breathing on room air  Cardiovascular - Heart RRR  Abdomen - Soft, nondistended. No palpable masses or organomegaly. Morbidly obese abdomen. Tender to palpation in lower abdomen around drain site. Drain with dark  brown/red output  Skin: Warm, Dry         Data:     Recent Labs   Lab Test 03/14/24  0508 03/12/24  0534 03/11/24  0551   WBC 14.5* 12.6* 13.0*   HGB 13.8 13.0* 13.3   HCT 42.9 39.4* 39.1*    345 319      Recent Labs   Lab Test 03/14/24  0508 03/12/24  0534 03/11/24  0551   * 136 137   POTASSIUM 3.5 3.7 3.5   CHLORIDE 97* 99 99   CO2 22 26 28   BUN 7.2 5.8* 5.4*   CR 0.66* 0.67 0.67     Recent Labs   Lab Test 03/09/24  0532 03/08/24 1716 06/17/20  2315   BILITOTAL 0.9 0.8 0.7   ALT 59 81* 96*   AST 21 33 24   ALKPHOS 70 79 145   LIPASE  --  23  --       Recent Labs   Lab Test 03/13/24  1020   INR 1.20*     Recent Labs   Lab Test 03/14/24  0508 03/12/24  0534 03/11/24  0551 03/09/24  0532 03/08/24  2224 06/18/20  0815 06/18/20  0610 06/18/20  0207 06/17/20  2315 06/17/20  1704   CAROL 8.9 9.0 9.0   < >  --    < > 8.2*   < > 8.5 9.4   PHOS  --   --   --   --  3.1  --  1.4*  --  1.5* 2.9   MAG  --   --   --   --  1.7  --   --   --  2.3 2.7*    < > = values in this interval not displayed.     Recent Labs   Lab Test 03/14/24  0508 03/12/24  0534 03/11/24  0551 03/10/24  0837 03/09/24  0532 03/08/24 1716 06/18/20  0207 06/17/20  2330 06/17/20  2315   ANIONGAP 13 11 10   < > 14 16*   < >  --  21*   PROTEIN  --   --   --   --   --   --   --  100*  --    ALBUMIN  --   --   --   --  4.1 4.6  --   --  4.7    < > = values in this interval not displayed.       Harrison Garcia MD on 3/14/2024 at 8:37 AM

## 2024-03-14 NOTE — PLAN OF CARE
Problem: Infection  Goal: Absence of Infection Signs and Symptoms  3/14/2024 1738 by Lay Swift RN  Outcome: Progressing  3/14/2024 1052 by Lay Swift RN  Outcome: Progressing  Intervention: Prevent or Manage Infection  Recent Flowsheet Documentation  Taken 3/14/2024 1500 by Lay Swift RN  Infection Management: aseptic technique maintained  Taken 3/14/2024 0900 by Lay Swift RN  Infection Management: aseptic technique maintained     Problem: Pain Acute  Goal: Optimal Pain Control and Function  3/14/2024 1738 by Lay Swift RN  Outcome: Progressing  3/14/2024 1052 by Lay Swift RN  Outcome: Progressing  Intervention: Develop Pain Management Plan  Recent Flowsheet Documentation  Taken 3/14/2024 0747 by Lay Swift RN  Pain Management Interventions: medication (see MAR)  Intervention: Prevent or Manage Pain  Recent Flowsheet Documentation  Taken 3/14/2024 1500 by Lay Swift RN  Bowel Elimination Promotion:   adequate fluid intake promoted   ambulation promoted   diet adjusted  Medication Review/Management: medications reviewed  Taken 3/14/2024 0900 by Lay Swift RN  Bowel Elimination Promotion:   adequate fluid intake promoted   ambulation promoted   diet adjusted  Medication Review/Management: medications reviewed  Intervention: Optimize Psychosocial Wellbeing  Recent Flowsheet Documentation  Taken 3/14/2024 1500 by Lay Swift RN  Supportive Measures: active listening utilized  Diversional Activities: television  Taken 3/14/2024 0900 by Lay Siwft RN  Supportive Measures: active listening utilized  Diversional Activities: television   Goal Outcome Evaluation:      Plan of Care Reviewed With: patient    Overall Patient Progress: improvingOverall Patient Progress: improving    Patient getting toradol, oxycodone and tylenol for abdominal pain. IVF initiated and IV antibiotics changed after microbiology called with culture results from retroperitoneal abscess fluid  had growth of clostridium perfringens. SURENDRA drain flushed with 10 ml of saline and emptied drain of 25 ml of brownish colored fluid. Continues on a full liquid diet. Up independently in room.

## 2024-03-14 NOTE — PLAN OF CARE
Patient tolerated procedure well. Pain controlled with as needed Dilaudid, Oxycodone, Tylenol and Toradol.   Up with stand by assistance.   Chirag drain in place, dressing clean, dry and intact.

## 2024-03-14 NOTE — PROGRESS NOTES
[facility] Medicine Progress Note  Date of Service (when I saw the patient): 03/14/2024    REASON FOR ADMISSION / INTERVAL HISTORY:  Mynor Rollins is a 23 year old male with past medical hx of T2DM and obesity who presents on 3/8/2024 with 1d hx of LLQ pain.   Has mild pain. See details below.       Assessment/ Plan     Diverticular abscess vs other colonic wall thickening/inflammation   Possible sepsis   Presented with 1 day hx of LLQ pain with N/V/D, chills, fevers. CT abdomen showed 3.4 cm possible abcess. WBC 14.7. Tmax 100. Was started on cefepime and metronidazole. Repeat CT scan 3/12 showed abscess enlarging.   Gen surg consulted. S/p IR guided drainage 3/13 at [facility]. Successful placement of 12 FR APD into left abdominal abscess with aspiration of ~100 mL of brown/purulent fluid.   Wound/ abscess cx growing clostridium perfringens. CRP is 200 double of yesterday. WBC 14.5k-worse. Pt has been tachycardic in 120s today. Gave 1L fluid bolus. Will stop zosyn/ metronidazole. Will start PCN and clinda. Surgery to consider repeat CT abd and possible surgery.  Discussed  with surgeon. Will scan today.      Type 2 diabetes mellitus (H)  Initially diagnosed in 2020 when he presented with DKA and A1c was 11.3.  A1c on admission 9.0. Some question of T1DM vs T2DM. Treated as T2DM outpatient and patient not using insulin at home and only on Ozempic.   -160  - continue Sliding scale insulin     Elevated anion gap without acidosis, resolved  Ketonemia, improved   Anion gap 16. Ketones 1.6 ?  2.1. VBG showed pH 7.39. CO2 38. Bicarb 23. Ketones likely elevated from decreased PO intake.       Diet: Full Liquid Diet    DVT Prophylaxis: Ambulate every shift  Damico Catheter: Not present  Code Status: Full Code        TYALOR COWART MD   Pg [phone]        ROS:  As described in A/P and Exam.  Otherwise ALL are  negative.    PHYSICAL EXAM:  All vitals have been reviewed    Blood  pressure 112/63, pulse 99, temperature 97.9  F (36.6  C), temperature source Oral, resp. rate 18, SpO2 95%.    I/O this shift:  In: -   Out: 25 [Drains:25]    GENERAL APPEARANCE: obese, alert and no distress  EYES: conjunctiva clear, eyes grossly normal  HENT: external ears and nose normal   RESP: lungs clear to auscultation - no rales, rhonchi or wheezes  CV: regular rate and rhythm, normal S1 S2, no S3 or S4 and no murmur, click or rub   ABDOMEN: soft, mild-mod tenderness LLQ no HSM or masses and bowel sounds normal  MS: no clubbing, cyanosis; no edema  SKIN: clear without significant rashes or lesions  NEURO: -non-focal moves all 4 extr    ROUTINE  LABS (Last four results)  CMP  Recent Labs   Lab 03/14/24  1102 03/14/24  0756 03/14/24  0722 03/14/24  0508 03/12/24  0741 03/12/24  0534 03/11/24  0717 03/11/24  0551 03/10/24  1117 03/10/24  0837 03/09/24  0804 03/09/24  0532 03/08/24  2237 03/08/24  2224 03/08/24  1716   NA  --   --   --  132*  --  136  --  137  --  133*  --  139  --   --  136   POTASSIUM  --   --   --  3.5  --  3.7  --  3.5  --  3.4  --  4.1  --  3.7 3.5   CHLORIDE  --   --   --  97*  --  99  --  99  --  98  --  102  --   --  99   CO2  --   --   --  22  --  26  --  28  --  22  --  23  --   --  21*   ANIONGAP  --   --   --  13  --  11  --  10  --  13  --  14  --   --  16*   * 156* 132* 171*   < > 130*   < > 130*   < > 191*   < > 199*   < >  --  206*   BUN  --   --   --  7.2  --  5.8*  --  5.4*  --  4.5*  --  5.1*  --   --  7.4   CR  --   --   --  0.66*  --  0.67  --  0.67  --  0.59*  --  0.70  --   --  0.63*   GFRESTIMATED  --   --   --  >90  --  >90  --  >90  --  >90  --  >90  --   --  >90   CAROL  --   --   --  8.9  --  9.0  --  9.0  --  9.2  --  8.9  --   --  9.4   MAG  --   --   --   --   --   --   --   --   --   --   --   --   --  1.7  --    PHOS  --   --   --   --   --   --   --   --   --   --   --   --   --  3.1  --    PROTTOTAL  --   --   --   --   --   --   --   --   --   --   --  7.0   --   --  8.1   ALBUMIN  --   --   --   --   --   --   --   --   --   --   --  4.1  --   --  4.6   BILITOTAL  --   --   --   --   --   --   --   --   --   --   --  0.9  --   --  0.8   ALKPHOS  --   --   --   --   --   --   --   --   --   --   --  70  --   --  79   AST  --   --   --   --   --   --   --   --   --   --   --  21  --   --  33   ALT  --   --   --   --   --   --   --   --   --   --   --  59  --   --  81*    < > = values in this interval not displayed.     CBC  Recent Labs   Lab 03/14/24  0508 03/12/24  0534 03/11/24  0551 03/10/24  0837   WBC 14.5* 12.6* 13.0* 13.6*   RBC 4.95 4.64 4.58 4.77   HGB 13.8 13.0* 13.3 13.6   HCT 42.9 39.4* 39.1* 40.9   MCV 87 85 85 86   MCH 27.9 28.0 29.0 28.5   MCHC 32.2 33.0 34.0 33.3   RDW 11.6 11.4 11.5 11.5    345 319 299     INR  Recent Labs   Lab 03/13/24  1020   INR 1.20*     Arterial Blood Gas  Recent Labs   Lab 03/08/24  2102   O2PER 21       No results found for this or any previous visit (from the past 24 hour(s)).

## 2024-03-14 NOTE — PROGRESS NOTES
Interventional Radiology - Progress Note Chart CHECK  Inpatient   03/14/2024     S:  Unable to assess, pt has transferred to OSH    LLQ pain with dx of diverticulitis with abscess, s/p drain placement yesterday per below.     Culture:  Gram Stain Result  Abnormal   4+ Gram positive cocci      1+ Gram positive bacilli      4+ WBC seen   Predominantly PMNs        Abx/anti infective: Flagyl, Cefepime   Flushing: None ordered- will change orders    O:  /60 (BP Location: Right arm)   Pulse (!) 128   Temp 98  F (36.7  C) (Oral)   Resp 16   SpO2 95%       IMAGING:  Narrative & Impression   EXAM:  1. PERCUTANEOUS DRAIN PLACEMENT LEFT ABDOMINAL ABSCESS  2. CT GUIDANCE  3. CONSCIOUS SEDATION  LOCATION: [facility]  DATE: 3/13/2024     INDICATION: LLQ abscess  TECHNIQUE: Dose reduction techniques were used.     PROCEDURE: Informed consent obtained. Site marked. Prior images reviewed. Required items made available. Patient identity confirmed verbally and with arm band. Patient reevaluated immediately before administering sedation. Universal protocol was   followed. Time out performed. The site was prepped and draped in sterile fashion. 10 mL of 1% lidocaine was infused into the local soft tissues. Using standard technique and under direct CT guidance, a 12 Uzbek drainage catheter was inserted into the   fluid collection.       SPECIMEN: 100 mL of brown purulent fluid was aspirated and sent to lab for cultures and Gram stain.     BLOOD LOSS: Minimal.     The patient tolerated the procedure well. No immediate complications.     SEDATION: Versed 2 mg. Fentanyl 100 mcg. The procedure was performed with administration intravenous conscious sedation with appropriate preoperative, intraoperative, and postoperative evaluation.     35 minutes of supervised face to face conscious sedation time was provided by a radiology nurse under my direct supervision.                                                                       IMPRESSION:  Successful CT-guided drain placement into left lower quadrant abscess.          LABS:  Recent Labs   Lab 03/14/24  0508 03/13/24  1020 03/12/24  0534 03/11/24  0551 03/10/24  0837 03/09/24  0532 03/08/24  1716   WBC 14.5*  --  12.6* 13.0* 13.6* 13.5* 14.7*   HGB 13.8  --  13.0* 13.3 13.6 13.5 15.7     --  345 319 299 291 340   INR  --  1.20*  --   --   --   --   --    CR 0.66*  --  0.67 0.67 0.59* 0.70 0.63*   BILITOTAL  --   --   --   --   --  0.9 0.8   ALKPHOS  --   --   --   --   --  70 79   AST  --   --   --   --   --  21 33   ALT  --   --   --   --   --  59 81*     Drain Outputs (in mL):  3/13 130   3/14 15                   A:  23 year old male     - PMH per above  - Previous dx of diverticulitis initially tx with abx  - Now with abscess  - S/P drain placement yesterday, per above  - Culture, abx per above  - Surgery team following    P:    - Continue to follow WBC and Cultures, drain OPs and patient's clinical signs/symptoms for improvement.   - Start flushing with 10mL NS Q shift  - Continue present drain cares. Continue drain to bulb drainage. Flush drain as ordered.  - Antibiotics per primary team  - Anticipate that patient will likely discharge with drain in place.  - Drain discharge instructions entered into D/C navigator.  - Patient to flush drain with 10mL NS daily upon discharge. NS flushes ordered in D/C navigator.  - CT and abscessogram ordered for 7-10 days  - IR will continue to follow loosely. Please contact IR with drain related questions or concerns.        Total time spent on the date of the encounter: 15 minutes.    LIANA CHRISTINA NP  Interventional Radiology  [phone]

## 2024-03-15 LAB
ANION GAP SERPL CALCULATED.3IONS-SCNC: 12 MMOL/L (ref 7–15)
BASOPHILS # BLD AUTO: 0.1 10E3/UL (ref 0–0.2)
BASOPHILS NFR BLD AUTO: 0 %
BUN SERPL-MCNC: 6.4 MG/DL (ref 6–20)
CALCIUM SERPL-MCNC: 8.5 MG/DL (ref 8.6–10)
CHLORIDE SERPL-SCNC: 99 MMOL/L (ref 98–107)
CREAT SERPL-MCNC: 0.67 MG/DL (ref 0.67–1.17)
CRP SERPL-MCNC: 204.16 MG/L
DEPRECATED HCO3 PLAS-SCNC: 22 MMOL/L (ref 22–29)
EGFRCR SERPLBLD CKD-EPI 2021: >90 ML/MIN/1.73M2
EOSINOPHIL # BLD AUTO: 0.1 10E3/UL (ref 0–0.7)
EOSINOPHIL NFR BLD AUTO: 1 %
ERYTHROCYTE [DISTWIDTH] IN BLOOD BY AUTOMATED COUNT: 11.5 % (ref 10–15)
GLUCOSE BLDC GLUCOMTR-MCNC: 105 MG/DL (ref 70–99)
GLUCOSE BLDC GLUCOMTR-MCNC: 114 MG/DL (ref 70–99)
GLUCOSE BLDC GLUCOMTR-MCNC: 129 MG/DL (ref 70–99)
GLUCOSE BLDC GLUCOMTR-MCNC: 132 MG/DL (ref 70–99)
GLUCOSE BLDC GLUCOMTR-MCNC: 155 MG/DL (ref 70–99)
GLUCOSE SERPL-MCNC: 132 MG/DL (ref 70–99)
HCT VFR BLD AUTO: 38.4 % (ref 40–53)
HGB BLD-MCNC: 12.8 G/DL (ref 13.3–17.7)
IMM GRANULOCYTES # BLD: 0.2 10E3/UL
IMM GRANULOCYTES NFR BLD: 1 %
LACTATE SERPL-SCNC: 1.3 MMOL/L (ref 0.7–2)
LYMPHOCYTES # BLD AUTO: 2.1 10E3/UL (ref 0.8–5.3)
LYMPHOCYTES NFR BLD AUTO: 14 %
MCH RBC QN AUTO: 28.4 PG (ref 26.5–33)
MCHC RBC AUTO-ENTMCNC: 33.3 G/DL (ref 31.5–36.5)
MCV RBC AUTO: 85 FL (ref 78–100)
MONOCYTES # BLD AUTO: 1.3 10E3/UL (ref 0–1.3)
MONOCYTES NFR BLD AUTO: 9 %
NEUTROPHILS # BLD AUTO: 11.2 10E3/UL (ref 1.6–8.3)
NEUTROPHILS NFR BLD AUTO: 75 %
NRBC # BLD AUTO: 0 10E3/UL
NRBC BLD AUTO-RTO: 0 /100
PLATELET # BLD AUTO: 407 10E3/UL (ref 150–450)
POTASSIUM SERPL-SCNC: 3.9 MMOL/L (ref 3.4–5.3)
RBC # BLD AUTO: 4.5 10E6/UL (ref 4.4–5.9)
SODIUM SERPL-SCNC: 133 MMOL/L (ref 135–145)
WBC # BLD AUTO: 15 10E3/UL (ref 4–11)

## 2024-03-15 PROCEDURE — 99418 PROLNG IP/OBS E/M EA 15 MIN: CPT | Performed by: INTERNAL MEDICINE

## 2024-03-15 PROCEDURE — 250N000013 HC RX MED GY IP 250 OP 250 PS 637

## 2024-03-15 PROCEDURE — 250N000011 HC RX IP 250 OP 636

## 2024-03-15 PROCEDURE — 85025 COMPLETE CBC W/AUTO DIFF WBC: CPT | Performed by: INTERNAL MEDICINE

## 2024-03-15 PROCEDURE — 99233 SBSQ HOSP IP/OBS HIGH 50: CPT | Performed by: INTERNAL MEDICINE

## 2024-03-15 PROCEDURE — 80048 BASIC METABOLIC PNL TOTAL CA: CPT | Performed by: INTERNAL MEDICINE

## 2024-03-15 PROCEDURE — 120N000001 HC R&B MED SURG/OB

## 2024-03-15 PROCEDURE — 36415 COLL VENOUS BLD VENIPUNCTURE: CPT | Performed by: INTERNAL MEDICINE

## 2024-03-15 PROCEDURE — 258N000003 HC RX IP 258 OP 636: Performed by: INTERNAL MEDICINE

## 2024-03-15 PROCEDURE — 250N000011 HC RX IP 250 OP 636: Performed by: INTERNAL MEDICINE

## 2024-03-15 PROCEDURE — 86140 C-REACTIVE PROTEIN: CPT | Performed by: INTERNAL MEDICINE

## 2024-03-15 PROCEDURE — 83605 ASSAY OF LACTIC ACID: CPT | Performed by: INTERNAL MEDICINE

## 2024-03-15 RX ORDER — AMPICILLIN AND SULBACTAM 2; 1 G/1; G/1
3 INJECTION, POWDER, FOR SOLUTION INTRAMUSCULAR; INTRAVENOUS EVERY 6 HOURS
Status: DISCONTINUED | OUTPATIENT
Start: 2024-03-15 | End: 2024-03-18 | Stop reason: HOSPADM

## 2024-03-15 RX ADMIN — SODIUM CHLORIDE 4 MILLION UNITS: 9 INJECTION, SOLUTION INTRAVENOUS at 08:08

## 2024-03-15 RX ADMIN — SODIUM CHLORIDE 4 MILLION UNITS: 9 INJECTION, SOLUTION INTRAVENOUS at 04:32

## 2024-03-15 RX ADMIN — HYDROMORPHONE HYDROCHLORIDE 0.5 MG: 1 INJECTION, SOLUTION INTRAMUSCULAR; INTRAVENOUS; SUBCUTANEOUS at 07:37

## 2024-03-15 RX ADMIN — INSULIN ASPART 1 UNITS: 100 INJECTION, SOLUTION INTRAVENOUS; SUBCUTANEOUS at 17:53

## 2024-03-15 RX ADMIN — HYDROMORPHONE HYDROCHLORIDE 0.5 MG: 1 INJECTION, SOLUTION INTRAMUSCULAR; INTRAVENOUS; SUBCUTANEOUS at 12:06

## 2024-03-15 RX ADMIN — HYDROMORPHONE HYDROCHLORIDE 0.5 MG: 1 INJECTION, SOLUTION INTRAMUSCULAR; INTRAVENOUS; SUBCUTANEOUS at 10:07

## 2024-03-15 RX ADMIN — OXYCODONE HYDROCHLORIDE 10 MG: 5 TABLET ORAL at 21:32

## 2024-03-15 RX ADMIN — SODIUM CHLORIDE 4 MILLION UNITS: 9 INJECTION, SOLUTION INTRAVENOUS at 00:54

## 2024-03-15 RX ADMIN — SODIUM CHLORIDE 4 MILLION UNITS: 9 INJECTION, SOLUTION INTRAVENOUS at 12:06

## 2024-03-15 RX ADMIN — HYDROMORPHONE HYDROCHLORIDE 0.5 MG: 1 INJECTION, SOLUTION INTRAMUSCULAR; INTRAVENOUS; SUBCUTANEOUS at 15:05

## 2024-03-15 RX ADMIN — ACETAMINOPHEN 650 MG: 325 TABLET, FILM COATED ORAL at 12:06

## 2024-03-15 RX ADMIN — SODIUM CHLORIDE: 9 INJECTION, SOLUTION INTRAVENOUS at 04:33

## 2024-03-15 RX ADMIN — ACETAMINOPHEN 650 MG: 325 TABLET, FILM COATED ORAL at 21:32

## 2024-03-15 RX ADMIN — AMPICILLIN SODIUM, SULBACTAM SODIUM 3 G: 2; 1 INJECTION, POWDER, FOR SOLUTION INTRAMUSCULAR; INTRAVENOUS at 16:51

## 2024-03-15 RX ADMIN — AMPICILLIN SODIUM, SULBACTAM SODIUM 3 G: 2; 1 INJECTION, POWDER, FOR SOLUTION INTRAMUSCULAR; INTRAVENOUS at 21:33

## 2024-03-15 RX ADMIN — HYDROMORPHONE HYDROCHLORIDE 0.5 MG: 1 INJECTION, SOLUTION INTRAMUSCULAR; INTRAVENOUS; SUBCUTANEOUS at 05:40

## 2024-03-15 RX ADMIN — CLINDAMYCIN PHOSPHATE 900 MG: 900 INJECTION, SOLUTION INTRAVENOUS at 07:32

## 2024-03-15 RX ADMIN — HYDROMORPHONE HYDROCHLORIDE 0.5 MG: 1 INJECTION, SOLUTION INTRAMUSCULAR; INTRAVENOUS; SUBCUTANEOUS at 01:49

## 2024-03-15 RX ADMIN — HYDROMORPHONE HYDROCHLORIDE 0.5 MG: 1 INJECTION, SOLUTION INTRAMUSCULAR; INTRAVENOUS; SUBCUTANEOUS at 19:03

## 2024-03-15 ASSESSMENT — ACTIVITIES OF DAILY LIVING (ADL)
ADLS_ACUITY_SCORE: 22

## 2024-03-15 NOTE — PLAN OF CARE
Problem: Pain Acute  Goal: Optimal Pain Control and Function  Intervention: Prevent or Manage Pain  Recent Flowsheet Documentation  Taken 3/15/2024 0748 by Claudia Mccabe, RN  Medication Review/Management: medications reviewed   Goal Outcome Evaluation:      Plan of Care Reviewed With: patient    Overall Patient Progress: improvingOverall Patient Progress: improving    Patient reported ongoing pain 5-6/10, dilaudid given PRN,  ml/hr with intermittent ABX, independent in the room, full liquid diet, APAP given x 1 for 100.9 temp, SURENDRA with 20 ml OP and flushed with 10 ml, sepsis protocol with LA 1.3, able to make needs known

## 2024-03-15 NOTE — PROGRESS NOTES
Patient is alert and orientated. He has a SURENDRA Bulb drain that has had 30ml of output and also was irrigated with 10ml of NS. His blood glucose at HS was 148 and at 0200 was 132. He is on Penicillin G and getting that every 4 hours, and clindamycin every 8 hours. He is on a full liquid diet. He has a left PIV that is running 100ml/hr of Normal saline. He is up with a stand by assist to independent.

## 2024-03-15 NOTE — PROGRESS NOTES
GENERAL SURGERY Progress Note    Admission Date: 3/8/2024  3/15/2024         Assessment and Plan:     Mynor Rollins is a 23 year old male with abdominal pain, Type 2 DM. Found to have pelvic abscess, likely 2/2 to complicated diverticulitis. Initially too small for percutaneous drainage, was treated with IV antibiotics however repeat cross-sectional imaging shows that abscess growing in size. Underwent percutaneous drainage with interventional radiology at [facility] on 3/13/24. Patient with persistent leukocytosis, abdominal exam stable. Continues to pass gas and have bowel movements, tolerating PO liquids. Repeat CT obtained 3/14 shows interval decrease in size of pelvic abscess, drain appears retracted, and there is a new simple fluid collection in the low pelvis.     Discussed case with hospitalist here at [facility] as well as with Interventional Radiology team at [facility]. There is concern that these fluid collections may not be a sequelae of complicated diverticulitis. Patient has had abdominal pain but otherwise no GI symptoms. Per IR, imaging appears atypical for diverticulitis as colon does not appear markedly inflamed and there appear to be chronic sinus tracts. In addition, the fluid collections appear to be medial within the mesentery of the colon. Although this may be diverticular disease, this raises concerns for possible inflammatory bowel disease.     Per IR, there appears to be a window to drain the new fluid collection in the pelvis. Will plan for repeat IR drainage and culture of the fluid. In discussing with hospitalist, will plan for transfer if bed available. In the meantime, will continue IV fluids, IV antibiotics. If patient decompensates, then may  require urgent surgical intervention with diagnostic laparoscopy vs transfer for urgent IR drainage. This was communicated to patient as well, who expressed understanding and is amenable to the proposed plan.    - Monitor drain  output quality and volume  - Plan for transfer to facility with IR drainage pending bed availability  - Ok for PO liquids today. NPO at midnight for possible IR drainage.   - Continue to trend CBC and CRP  - Continue IV antibiotics, narrow based on cultures  - Encourage ambulation and IS  - Ok for DVT chemoprophylaxis, hold after midnight  - Surgery team will continue to follow             Interval History:     No acute events overnight. Patient tolerating liquids without nausea or emesis, continues to pass gas and have bowel movements. Continues to have some pain in lower abdomen, states this feels improved. No fevers or chills.                      Physical Exam:   Blood pressure 123/70, pulse 112, temperature 99.4  F (37.4  C), temperature source Oral, resp. rate 16, SpO2 96%.  Temperature Temp  Av.6  F (37  C)  Min: 98  F (36.7  C)  Max: 99.1  F (37.3  C)   I/O last 3 completed shifts:  In: -   Out: 55 [Drains:55]  Drain output past 24 hrs: 70 ml    Constitutional- No acute distress, well nourished, non-toxic  Respiratory- nonlabored breathing on room air  Cardiovascular - Heart RRR  Abdomen - Soft, nondistended. No palpable masses or organomegaly. Morbidly obese abdomen. Tender to palpation in lower abdomen around drain site. Drain with dark brown/red output  Skin: Warm, Dry         Data:     Recent Labs/Imaging   Lab Test 03/15/24  0525 24  0508 24  0534   CRPI 204.16* 208.37* 105.98*       Recent Labs   Lab Test 03/15/24  0525 24  0508 24  0534   WBC 15.0* 14.5* 12.6*   HGB 12.8* 13.8 13.0*   HCT 38.4* 42.9 39.4*    431 345      Recent Labs   Lab Test 03/15/24  0525 24  0508 24  0534   * 132* 136   POTASSIUM 3.9 3.5 3.7   CHLORIDE 99 97* 99   CO2 22 22 26   BUN 6.4 7.2 5.8*   CR 0.67 0.66* 0.67     Recent Labs   Lab Test 24  0532 24  1716 20  2315   BILITOTAL 0.9 0.8 0.7   ALT 59 81* 96*   AST 21 33 24   ALKPHOS 70 79 145   LIPASE  --  23   --       Recent Labs   Lab Test 03/13/24  1020   INR 1.20*     Recent Labs   Lab Test 03/15/24  0525 03/14/24  0508 03/12/24  0534 03/09/24  0532 03/08/24  2224 06/18/20  0815 06/18/20  0610 06/18/20  0207 06/17/20  2315 06/17/20  1704   CAROL 8.5* 8.9 9.0   < >  --    < > 8.2*   < > 8.5 9.4   PHOS  --   --   --   --  3.1  --  1.4*  --  1.5* 2.9   MAG  --   --   --   --  1.7  --   --   --  2.3 2.7*    < > = values in this interval not displayed.     Recent Labs   Lab Test 03/15/24  0525 03/14/24  0508 03/12/24  0534 03/10/24  0837 03/09/24  0532 03/08/24  1716 06/18/20  0207 06/17/20  2330 06/17/20  2315   ANIONGAP 12 13 11   < > 14 16*   < >  --  21*   PROTEIN  --   --   --   --   --   --   --  100*  --    ALBUMIN  --   --   --   --  4.1 4.6  --   --  4.7    < > = values in this interval not displayed.     30-Day Micro Results       Collected Updated Procedure Result Status      03/13/2024 1303 03/15/2024 1057 Abscess Aerobic Bacterial Culture Routine With Gram Stain [[id number]]   (Abnormal)   Abscess from Retroperitoneal    Preliminary result Component Value   Culture 4+ Enterococcus faecalis  [P]     4+ Enterococcus durans  [P]     2+ Gram negative bacilli  [P]    Gram Stain Result 4+ Gram positive cocci  [P]     1+ Gram positive bacilli  [P]     4+ WBC seen  [P]     Predominantly PMNs               03/13/2024 1303 03/15/2024 0924 Anaerobic Bacterial Culture Routine [[id number]]   (Abnormal)   Abscess from Retroperitoneal    Preliminary result Component Value   Culture Culture in progress  [P]     2+ Clostridium perfringens  [P]     Susceptibilities not routinely done, refer to antibiogram to view typical susceptibility profiles                     CT Abdomen/pelvis 3/14/24  IMPRESSION:   1.  Interval decrease in size of a previously seen left lower quadrant  abscess. Left lower quadrant percutaneous drainage catheter terminates  anterior to a small residual gas and high density collection. No  residual  drainable fluid collection is seen in this region at this  time. Previously described possible sinus tract between the residual  collection and adjacent colon appears unchanged.  2.  New fluid collection in the central low pelvis with thin  peripheral enhancement and no internal gas, suspicious for a  developing abscess.  3.  Mild to moderate fat stranding and edema in the low abdomen and  pelvis, likely reactive.  4.  Mild wall thickening of the distal descending and rectosigmoid  colon, nonspecific. No signs of bowel obstruction.  5.  Hepatic steatosis.       Harrison Garcia MD on 3/15/2024 at 7:31 AM

## 2024-03-15 NOTE — PROGRESS NOTES
[facility] Medicine Progress Note  Date of Service (when I saw the patient): 03/15/2024    REASON FOR ADMISSION / INTERVAL HISTORY:  Mynor Rollins is a 23 year old male with past medical hx of T2DM and obesity who presents on 3/8/2024 with 1d hx of LLQ pain.   Has mild pain-3/10. See details below.       Assessment/ Plan     Diverticular abscess vs other colonic wall thickening/inflammation   Possible sepsis   Presented with 1 day hx of LLQ pain with N/V/D, chills, fevers. CT abdomen showed 3.4 cm possible abcess. WBC 14.7. Tmax 100. Was started on cefepime and metronidazole. Repeat CT scan 3/12 showed abscess enlarging.   Gen surg consulted. S/p IR guided drainage 3/13 at [facility]. Successful placement of 12 FR APD into left abdominal abscess with aspiration of ~100 mL of brown/purulent fluid.   Wound/ abscess cx growing clostridium perfringens/ enterococcus/ GNB   On 3/14 CRP worsened to  200 from 100. WBC 14.5k-worse. Pt was  tachycardic in 120s . Gave 1L fluid bolus. Stopped  zosyn/ metronidazole and started PCN and clinda. Repeat CT 3/14 showed new pelvic abscess.   Discussed with IR at [facility]. Would not do any acute interventions. Abscess not large enough  to be drained. Would repeat CT abd Monday and re-assess for drain need. Discussed with ID-recommend changing antbx to unasyn which would cover enterococcus/ GNB and clostridium.   Will try to transfer pt to [facility] today-can have IR see there as consult. If unable to, would watch here, repeat CT Monday and f/up. Plan made in codecision with surgeon.      Type 2 diabetes mellitus (H)  Initially diagnosed in 2020 when he presented with DKA and A1c was 11.3.  A1c on admission 9.0. Some question of T1DM vs T2DM. Treated as T2DM outpatient and patient not using insulin at home and only on Ozempic.   -160  - continue Sliding scale insulin     Elevated anion gap without acidosis, resolved  Ketonemia, improved    Anion gap 16. Ketones 1.6 ?  2.1. VBG showed pH 7.39. CO2 38. Bicarb 23. Ketones likely elevated from decreased PO intake.       Diet: Full Liquid Diet    DVT Prophylaxis: Ambulate every shift  Damico Catheter: Not present  Code Status: Full Code      Time spent 90 min. Discussions with radiologists/ surgeons/ ID for complex pt care.       TAYLOR COWART MD   Pg [phone]        ROS:  As described in A/P and Exam.  Otherwise ALL are  negative.    PHYSICAL EXAM:  All vitals have been reviewed    Blood pressure 129/77, pulse 111, temperature (!) 100.9  F (38.3  C), temperature source Oral, resp. rate 18, SpO2 97%.    No intake/output data recorded.    GENERAL APPEARANCE: obese, alert and no distress  EYES: conjunctiva clear, eyes grossly normal  HENT: external ears and nose normal   RESP: lungs clear to auscultation - no rales, rhonchi or wheezes  CV: regular rate and rhythm, normal S1 S2, no S3 or S4 and no murmur, click or rub   ABDOMEN: soft, mild-mod tenderness LLQ no HSM or masses and bowel sounds normal  MS: no clubbing, cyanosis; no edema  SKIN: clear without significant rashes or lesions  NEURO: -non-focal moves all 4 extr    ROUTINE  LABS (Last four results)  CMP  Recent Labs   Lab 03/15/24  1152 03/15/24  0726 03/15/24  0525 03/15/24  0140 03/14/24  0722 03/14/24  0508 03/12/24  0741 03/12/24  0534 03/11/24  0717 03/11/24  0551 03/09/24  0804 03/09/24  0532 03/08/24  2237 03/08/24  2224 03/08/24  1716   NA  --   --  133*  --   --  132*  --  136  --  137   < > 139  --   --  136   POTASSIUM  --   --  3.9  --   --  3.5  --  3.7  --  3.5   < > 4.1  --  3.7 3.5   CHLORIDE  --   --  99  --   --  97*  --  99  --  99   < > 102  --   --  99   CO2  --   --  22  --   --  22  --  26  --  28   < > 23  --   --  21*   ANIONGAP  --   --  12  --   --  13  --  11  --  10   < > 14  --   --  16*   * 129* 132* 132*   < > 171*   < > 130*   < > 130*   < > 199*   < >  --  206*   BUN  --   --  6.4  --   --  7.2  --  5.8*  --   5.4*   < > 5.1*  --   --  7.4   CR  --   --  0.67  --   --  0.66*  --  0.67  --  0.67   < > 0.70  --   --  0.63*   GFRESTIMATED  --   --  >90  --   --  >90  --  >90  --  >90   < > >90  --   --  >90   CAROL  --   --  8.5*  --   --  8.9  --  9.0  --  9.0   < > 8.9  --   --  9.4   MAG  --   --   --   --   --   --   --   --   --   --   --   --   --  1.7  --    PHOS  --   --   --   --   --   --   --   --   --   --   --   --   --  3.1  --    PROTTOTAL  --   --   --   --   --   --   --   --   --   --   --  7.0  --   --  8.1   ALBUMIN  --   --   --   --   --   --   --   --   --   --   --  4.1  --   --  4.6   BILITOTAL  --   --   --   --   --   --   --   --   --   --   --  0.9  --   --  0.8   ALKPHOS  --   --   --   --   --   --   --   --   --   --   --  70  --   --  79   AST  --   --   --   --   --   --   --   --   --   --   --  21  --   --  33   ALT  --   --   --   --   --   --   --   --   --   --   --  59  --   --  81*    < > = values in this interval not displayed.     CBC  Recent Labs   Lab 03/15/24  0525 03/14/24  0508 03/12/24  0534 03/11/24  0551   WBC 15.0* 14.5* 12.6* 13.0*   RBC 4.50 4.95 4.64 4.58   HGB 12.8* 13.8 13.0* 13.3   HCT 38.4* 42.9 39.4* 39.1*   MCV 85 87 85 85   MCH 28.4 27.9 28.0 29.0   MCHC 33.3 32.2 33.0 34.0   RDW 11.5 11.6 11.4 11.5    431 345 319     INR  Recent Labs   Lab 03/13/24  1020   INR 1.20*     Arterial Blood Gas  Recent Labs   Lab 03/08/24  2102   O2PER 21       Recent Results (from the past 24 hour(s))   CT Abd/Pelvis w Contrast*    Narrative    CT ABDOMEN PELVIS WITH CONTRAST 3/14/2024 3:45 PM    CLINICAL HISTORY: Diverticular abscess-clostridium perfringens  positive-rule out gas.    TECHNIQUE: CT scan of the abdomen and pelvis was performed following  injection of IV contrast. Multiplanar reformats were obtained. Dose  reduction techniques were used.  CONTRAST: 100mL Isovue 370    COMPARISON: 3/12/2024.    FINDINGS:   LOWER CHEST: Benign punctate calcified pulmonary granuloma  in the  anterior left lower lobe. No follow-up is necessary.    HEPATOBILIARY: Low attenuation is seen throughout the liver  parenchyma, compatible with fatty infiltration. No suspicious  appearing hepatic observations. No signs of cholelithiasis, acute  cholecystitis, or bile duct dilation. There is a tiny focus of  extraluminal gas in the upper abdomen anterior to the left hepatic  lobe (3-31), which is likely secondary to percutaneous catheter  placement.    PANCREAS: Normal.    SPLEEN: Normal.    ADRENAL GLANDS: Normal.    KIDNEYS/BLADDER: Normal.    BOWEL: There is mild wall thickening of the distal descending and  rectosigmoid colon, which is decompressed. No signs of bowel  obstruction. No pneumatosis. With the exception of a single tiny focus  of gas in the upper abdomen. No evidence of significant  pneumoperitoneum to suggest viscus perforation. There is a residual  high density collection in the left lower quadrant corresponding to  the previously described abscess which is further discussed below. As  before, this residual collection abuts the sigmoid and distal  descending colon with a possible sinus tract again noted (series 3,  image 177).    PELVIC ORGANS: Normal.    ADDITIONAL FINDINGS: A left lower quadrant percutaneous pigtail  drainage catheter is present. The catheter terminates anterior to a  residual complex collection, which has decreased in size in the  interim now measuring 3.8 x 3.7 cm, previously 5.7 x 5.2 cm. The  contents of this residual complex collection are higher density than  expected for simple fluid. No residual drainable fluid collection is  seen in this region at this time. There is, however, a new lobulated  fluid collection in the central low pelvis with subtle thin peripheral  enhancement and no internal gas measuring 11.6 x 8.2 cm (3-208).    MUSCULOSKELETAL: Normal.      Impression    IMPRESSION:   1.  Interval decrease in size of a previously seen left lower  quadrant  abscess. Left lower quadrant percutaneous drainage catheter terminates  anterior to a small residual gas and high density collection. No  residual drainable fluid collection is seen in this region at this  time. Previously described possible sinus tract between the residual  collection and adjacent colon appears unchanged.  2.  New fluid collection in the central low pelvis with thin  peripheral enhancement and no internal gas, suspicious for a  developing abscess.  3.  Mild to moderate fat stranding and edema in the low abdomen and  pelvis, likely reactive.  4.  Mild wall thickening of the distal descending and rectosigmoid  colon, nonspecific. No signs of bowel obstruction.  5.  Hepatic steatosis.     LUIS HERMAN MD         SYSTEM ID:  [id number]

## 2024-03-16 LAB
BACTERIA ABSC ANAEROBE+AEROBE CULT: ABNORMAL
GLUCOSE BLDC GLUCOMTR-MCNC: 101 MG/DL (ref 70–99)
GLUCOSE BLDC GLUCOMTR-MCNC: 125 MG/DL (ref 70–99)
GLUCOSE BLDC GLUCOMTR-MCNC: 132 MG/DL (ref 70–99)
GLUCOSE BLDC GLUCOMTR-MCNC: 134 MG/DL (ref 70–99)
GLUCOSE BLDC GLUCOMTR-MCNC: 152 MG/DL (ref 70–99)
GRAM STAIN RESULT: ABNORMAL
HOLD SPECIMEN: NORMAL
LACTATE SERPL-SCNC: 0.9 MMOL/L (ref 0.7–2)

## 2024-03-16 PROCEDURE — 250N000011 HC RX IP 250 OP 636: Performed by: INTERNAL MEDICINE

## 2024-03-16 PROCEDURE — 250N000011 HC RX IP 250 OP 636

## 2024-03-16 PROCEDURE — 99233 SBSQ HOSP IP/OBS HIGH 50: CPT | Performed by: STUDENT IN AN ORGANIZED HEALTH CARE EDUCATION/TRAINING PROGRAM

## 2024-03-16 PROCEDURE — 36415 COLL VENOUS BLD VENIPUNCTURE: CPT | Performed by: INTERNAL MEDICINE

## 2024-03-16 PROCEDURE — 83605 ASSAY OF LACTIC ACID: CPT | Performed by: INTERNAL MEDICINE

## 2024-03-16 PROCEDURE — 99231 SBSQ HOSP IP/OBS SF/LOW 25: CPT | Performed by: SURGERY

## 2024-03-16 PROCEDURE — 99207 PR CDG-CUT & PASTE-POTENTIAL IMPACT ON LEVEL: CPT | Performed by: STUDENT IN AN ORGANIZED HEALTH CARE EDUCATION/TRAINING PROGRAM

## 2024-03-16 PROCEDURE — 120N000001 HC R&B MED SURG/OB

## 2024-03-16 PROCEDURE — 250N000013 HC RX MED GY IP 250 OP 250 PS 637

## 2024-03-16 PROCEDURE — 258N000003 HC RX IP 258 OP 636: Performed by: INTERNAL MEDICINE

## 2024-03-16 RX ORDER — CLINDAMYCIN PHOSPHATE 900 MG/50ML
900 INJECTION, SOLUTION INTRAVENOUS EVERY 8 HOURS
Status: DISCONTINUED | OUTPATIENT
Start: 2024-03-16 | End: 2024-03-16

## 2024-03-16 RX ADMIN — AMPICILLIN SODIUM, SULBACTAM SODIUM 3 G: 2; 1 INJECTION, POWDER, FOR SOLUTION INTRAMUSCULAR; INTRAVENOUS at 04:04

## 2024-03-16 RX ADMIN — HYDROMORPHONE HYDROCHLORIDE 0.5 MG: 1 INJECTION, SOLUTION INTRAMUSCULAR; INTRAVENOUS; SUBCUTANEOUS at 07:52

## 2024-03-16 RX ADMIN — HYDROMORPHONE HYDROCHLORIDE 0.5 MG: 1 INJECTION, SOLUTION INTRAMUSCULAR; INTRAVENOUS; SUBCUTANEOUS at 14:07

## 2024-03-16 RX ADMIN — OXYCODONE HYDROCHLORIDE 10 MG: 5 TABLET ORAL at 18:39

## 2024-03-16 RX ADMIN — ACETAMINOPHEN 650 MG: 325 TABLET, FILM COATED ORAL at 18:40

## 2024-03-16 RX ADMIN — AMPICILLIN SODIUM, SULBACTAM SODIUM 3 G: 2; 1 INJECTION, POWDER, FOR SOLUTION INTRAMUSCULAR; INTRAVENOUS at 22:43

## 2024-03-16 RX ADMIN — AMPICILLIN SODIUM, SULBACTAM SODIUM 3 G: 2; 1 INJECTION, POWDER, FOR SOLUTION INTRAMUSCULAR; INTRAVENOUS at 16:35

## 2024-03-16 RX ADMIN — HYDROMORPHONE HYDROCHLORIDE 0.5 MG: 1 INJECTION, SOLUTION INTRAMUSCULAR; INTRAVENOUS; SUBCUTANEOUS at 16:38

## 2024-03-16 RX ADMIN — SODIUM CHLORIDE: 9 INJECTION, SOLUTION INTRAVENOUS at 18:02

## 2024-03-16 RX ADMIN — SODIUM CHLORIDE 1000 ML: 9 INJECTION, SOLUTION INTRAVENOUS at 06:46

## 2024-03-16 RX ADMIN — HYDROMORPHONE HYDROCHLORIDE 0.5 MG: 1 INJECTION, SOLUTION INTRAMUSCULAR; INTRAVENOUS; SUBCUTANEOUS at 10:18

## 2024-03-16 RX ADMIN — AMPICILLIN SODIUM, SULBACTAM SODIUM 3 G: 2; 1 INJECTION, POWDER, FOR SOLUTION INTRAMUSCULAR; INTRAVENOUS at 10:19

## 2024-03-16 ASSESSMENT — ACTIVITIES OF DAILY LIVING (ADL)
ADLS_ACUITY_SCORE: 21
ADLS_ACUITY_SCORE: 22
ADLS_ACUITY_SCORE: 21
ADLS_ACUITY_SCORE: 22
ADLS_ACUITY_SCORE: 21
ADLS_ACUITY_SCORE: 22
ADLS_ACUITY_SCORE: 21
ADLS_ACUITY_SCORE: 22
ADLS_ACUITY_SCORE: 21
ADLS_ACUITY_SCORE: 22
ADLS_ACUITY_SCORE: 21
ADLS_ACUITY_SCORE: 22
ADLS_ACUITY_SCORE: 21

## 2024-03-16 NOTE — PROGRESS NOTES
Patient advanced to regular diet for supper.   Tolerated well.   Patient states pain controlled with dilaudid IV. Discussed switching to oral oxycodone for longer lasting control.

## 2024-03-16 NOTE — PROGRESS NOTES
"General Surgery    S: Feeling pretty well.  Tolerating liquid diet.  Has had bowel movements and passing gas.  Can feel some bladder pain when voiding.    O:  Vital signs:  Temp: 98.9  F (37.2  C) Temp src: Oral BP: 130/80 Pulse: 117   Resp: 18 SpO2: 98 % O2 Device: None (Room air)        Estimated body mass index is 48 kg/m  as calculated from the following:    Height as of 3/8/24: 1.702 m (5' 7\").    Weight as of 3/8/24: 139 kg (306 lb 7 oz).      Gen: AAOx3, NAD  Abd: Soft, obese.  SURENDRA drain with dark brown liquid    Labs/Imaging  Results for orders placed or performed during the hospital encounter of 03/13/24 (from the past 24 hour(s))   Lactic Acid STAT   Result Value Ref Range    Lactic Acid 1.3 0.7 - 2.0 mmol/L   Glucose by meter   Result Value Ref Range    GLUCOSE BY METER POCT 155 (H) 70 - 99 mg/dL   Glucose by meter   Result Value Ref Range    GLUCOSE BY METER POCT 114 (H) 70 - 99 mg/dL   Glucose by meter   Result Value Ref Range    GLUCOSE BY METER POCT 132 (H) 70 - 99 mg/dL   Lactic Acid STAT   Result Value Ref Range    Lactic Acid 0.9 0.7 - 2.0 mmol/L   Extra Tube    Narrative    The following orders were created for panel order Extra Tube.  Procedure                               Abnormality         Status                     ---------                               -----------         ------                     Extra Blue Top Tube[[id number]]                                                         Extra Purple Top Tube[[id number]]                            Final result                 Please view results for these tests on the individual orders.   Extra Purple Top Tube   Result Value Ref Range    Hold Specimen     Glucose by meter   Result Value Ref Range    GLUCOSE BY METER POCT 125 (H) 70 - 99 mg/dL   Glucose by meter   Result Value Ref Range    GLUCOSE BY METER POCT 101 (H) 70 - 99 mg/dL           A: 23-year-old male status post IR drain for pelvic abscess initially thought secondary to complicated " diverticulitis however may be more related to inflammatory bowel disease with secondary inflammatory changes of the colon.  Most recent CT showed development of new abscess separate from where the drain had been placed  Patient doing well, stable    P: Since patient doing well we will continue to monitor with current drain and repeat CT Monday to determine if would need to transfer for further IR interventions.  Since no plan for current procedure okay to advance diet.    William Ly MD

## 2024-03-16 NOTE — PLAN OF CARE
Goal Outcome Evaluation:      Plan of Care Reviewed With: patient    Overall Patient Progress: improvingOverall Patient Progress: improving    Patient reported ongoing abdominal pain rated 4-9/10, dilaudid given PRN,  ml/hr with intermittent ABX, independent in the room, plan for CT on 3/18, able to make needs known

## 2024-03-16 NOTE — PLAN OF CARE
Problem: Adult Inpatient Plan of Care  Goal: Optimal Comfort and Wellbeing  Intervention: Monitor Pain and Promote Comfort  Recent Flowsheet Documentation  Taken 3/15/2024 1918 by Alee Mariscal RN  Pain Management Interventions:   quiet environment facilitated   pain management plan reviewed with patient/caregiver   Goal Outcome Evaluation:      Patient reports adequate pain relief with repositioning and oral oxycodone and tylenol given at bedtime.

## 2024-03-16 NOTE — PROGRESS NOTES
[facility]    Medicine Progress Note - Hospitalist Service    Date of Admission:  3/13/2024    Assessment & Plan   Mynor Rollins is a 23 year old male with past medical hx of T2DM and obesity who presents on 3/8/2024 with 1d hx of LLQ pain.      Diverticular abscess  E. Faecalis, enterococcus durans, and e. Coli soft tissue infection  C. Perfringens soft tissue infection  Sepsis   Presented with 1 day hx of LLQ pain with N/V/D, chills, fevers. CT abdomen showed 3.4 cm possible abcess. WBC 14.7. Tmax 100. Was started on cefepime and metronidazole. Repeat CT scan 3/12 showed abscess enlarging. Gen surg consulted. S/p IR guided drainage 3/13 at [facility]. Successful placement of 12 FR APD into left abdominal abscess with aspiration of ~100 mL of brown/purulent fluid.   Wound/ abscess cx growing clostridium perfringens/ enterococcus/ GNB. On 3/14 CRP worsened to 200 from 100. WBC 14.5k-worse. Pt was tachycardic in 120s . Gave 1L fluid bolus. Stopped  zosyn/ metronidazole and started PCN and clinda. Repeat CT 3/14 showed new pelvic abscess. Discussed with IR at [facility]. Would not do any acute interventions. Abscess not large enough  to be drained. Would repeat CT abd Monday and re-assess for drain need. Discussed with ID-recommend changing antbx to unasyn which would cover enterococcus/ GNB and clostridium. IR at [facility] recommended observation while remaining on IV antibiotics over the weekend with repeat imaging on 03/18 to monitor for interval improvement of the fluid collection. Drain output has been minimal the last 24 hours (~45 mL).  - Continue IV Unasyn 3 g every 6 hours  - Plan for repeat CT A/P 3/18, potentially transfer to [facility] for IR drainage vs prolonged antibiotic course with repeat follow-up imaging as an outpatient  - Regular diet  - Surgery consulted, appreciate their recommendations  - Daily CBC and BMP  - IVF at 100 ml/hr  - Continue to monitor drain output    "  Type 2 diabetes mellitus   Initially diagnosed in 2020 when he presented with DKA and A1c was 11.3%.  A1c on admission 9.0. Some question of T1DM vs T2DM. Treated as T2DM outpatient and patient not using insulin at home and only on Ozempic.   -160  - continue sliding scale insulin     Elevated anion gap without acidosis, resolved  Ketonemia, improved   Anion gap 16. Ketones 1.6 ?  2.1. VBG showed pH 7.39. CO2 38. Bicarb 23. Ketones likely elevated from decreased PO intake.           Diet: Full Liquid Diet    DVT Prophylaxis: Low Risk/Ambulatory with no VTE prophylaxis indicated  Damico Catheter: Not present  Lines: None     Cardiac Monitoring: None  Code Status: Full Code      Clinically Significant Risk Factors                        # DMII: A1C = 9.0 % (Ref range: <5.7 %) within past 6 months, PRESENT ON ADMISSION  # Severe Obesity: Estimated body mass index is 48 kg/m  as calculated from the following:    Height as of 3/8/24: 1.702 m (5' 7\").    Weight as of 3/8/24: 139 kg (306 lb 7 oz)., PRESENT ON ADMISSION            Disposition Plan      Expected Discharge Date: 03/19/2024      Destination: home with family              Fox Zhao, DO  Hospitalist Service  [facility]  Securely message with HTP (more info)  Text page via [facility] Paging/Directory   ______________________________________________________________________    Interval History   No acute events overnight. Reports some intermittent cramping pain that quickly subsides. He has increased appetite. He denies any fever or chills overnight. He notes no other acute concerns or complaints. Discussed with him and his mom about the course of treatment and plan to re-scan abdomen on Monday morning.     Physical Exam   Vital Signs: Temp: 98.9  F (37.2  C) Temp src: Oral BP: 130/80 Pulse: 117   Resp: 18 SpO2: 98 % O2 Device: None (Room air)    Weight: 0 lbs 0 oz    Constitutional: awake, alert, cooperative, no apparent " distress, and appears stated age  Respiratory: No increased work of breathing, good air exchange, clear to auscultation bilaterally, no crackles or wheezing  Cardiovascular: Normal apical impulse, regular rate and rhythm, normal S1 and S2, no S3 or S4, and no murmur noted  GI: No scars, normal bowel sounds, soft, non-distended, mildly tender LLQ, no masses palpated, no hepatosplenomegally  Skin: normal skin color, texture, turgor  Musculoskeletal: There is no redness, warmth, or swelling of the joints.  Full range of motion noted.  Motor strength is 5 out of 5 all extremities bilaterally.  Tone is normal.    Medical Decision Making       45 MINUTES SPENT BY ME on the date of service doing chart review, history, exam, documentation & further activities per the note.      Data     I have personally reviewed the following data over the past 24 hrs:    Procal: N/A CRP: N/A Lactic Acid: 0.9         Imaging results reviewed over the past 24 hrs:   No results found for this or any previous visit (from the past 24 hour(s)).

## 2024-03-17 LAB
ANION GAP SERPL CALCULATED.3IONS-SCNC: 13 MMOL/L (ref 7–15)
BUN SERPL-MCNC: 4.6 MG/DL (ref 6–20)
CALCIUM SERPL-MCNC: 8.7 MG/DL (ref 8.6–10)
CHLORIDE SERPL-SCNC: 100 MMOL/L (ref 98–107)
CREAT SERPL-MCNC: 0.61 MG/DL (ref 0.67–1.17)
DEPRECATED HCO3 PLAS-SCNC: 25 MMOL/L (ref 22–29)
EGFRCR SERPLBLD CKD-EPI 2021: >90 ML/MIN/1.73M2
ERYTHROCYTE [DISTWIDTH] IN BLOOD BY AUTOMATED COUNT: 11.6 % (ref 10–15)
GLUCOSE BLDC GLUCOMTR-MCNC: 109 MG/DL (ref 70–99)
GLUCOSE BLDC GLUCOMTR-MCNC: 142 MG/DL (ref 70–99)
GLUCOSE BLDC GLUCOMTR-MCNC: 154 MG/DL (ref 70–99)
GLUCOSE BLDC GLUCOMTR-MCNC: 155 MG/DL (ref 70–99)
GLUCOSE BLDC GLUCOMTR-MCNC: 178 MG/DL (ref 70–99)
GLUCOSE SERPL-MCNC: 116 MG/DL (ref 70–99)
HCT VFR BLD AUTO: 40.7 % (ref 40–53)
HGB BLD-MCNC: 13.3 G/DL (ref 13.3–17.7)
MAGNESIUM SERPL-MCNC: 2.1 MG/DL (ref 1.7–2.3)
MCH RBC QN AUTO: 28.3 PG (ref 26.5–33)
MCHC RBC AUTO-ENTMCNC: 32.7 G/DL (ref 31.5–36.5)
MCV RBC AUTO: 87 FL (ref 78–100)
PLATELET # BLD AUTO: 447 10E3/UL (ref 150–450)
POTASSIUM SERPL-SCNC: 3.6 MMOL/L (ref 3.4–5.3)
RBC # BLD AUTO: 4.7 10E6/UL (ref 4.4–5.9)
SODIUM SERPL-SCNC: 138 MMOL/L (ref 135–145)
WBC # BLD AUTO: 11.5 10E3/UL (ref 4–11)

## 2024-03-17 PROCEDURE — 258N000003 HC RX IP 258 OP 636: Performed by: INTERNAL MEDICINE

## 2024-03-17 PROCEDURE — 250N000013 HC RX MED GY IP 250 OP 250 PS 637

## 2024-03-17 PROCEDURE — 99233 SBSQ HOSP IP/OBS HIGH 50: CPT | Performed by: STUDENT IN AN ORGANIZED HEALTH CARE EDUCATION/TRAINING PROGRAM

## 2024-03-17 PROCEDURE — 82310 ASSAY OF CALCIUM: CPT | Performed by: STUDENT IN AN ORGANIZED HEALTH CARE EDUCATION/TRAINING PROGRAM

## 2024-03-17 PROCEDURE — 250N000011 HC RX IP 250 OP 636: Performed by: INTERNAL MEDICINE

## 2024-03-17 PROCEDURE — 120N000001 HC R&B MED SURG/OB

## 2024-03-17 PROCEDURE — 85027 COMPLETE CBC AUTOMATED: CPT | Performed by: STUDENT IN AN ORGANIZED HEALTH CARE EDUCATION/TRAINING PROGRAM

## 2024-03-17 PROCEDURE — 250N000013 HC RX MED GY IP 250 OP 250 PS 637: Performed by: STUDENT IN AN ORGANIZED HEALTH CARE EDUCATION/TRAINING PROGRAM

## 2024-03-17 PROCEDURE — 80048 BASIC METABOLIC PNL TOTAL CA: CPT | Performed by: STUDENT IN AN ORGANIZED HEALTH CARE EDUCATION/TRAINING PROGRAM

## 2024-03-17 PROCEDURE — 83735 ASSAY OF MAGNESIUM: CPT | Performed by: STUDENT IN AN ORGANIZED HEALTH CARE EDUCATION/TRAINING PROGRAM

## 2024-03-17 PROCEDURE — 99207 PR CDG-CUT & PASTE-POTENTIAL IMPACT ON LEVEL: CPT | Performed by: STUDENT IN AN ORGANIZED HEALTH CARE EDUCATION/TRAINING PROGRAM

## 2024-03-17 PROCEDURE — 99231 SBSQ HOSP IP/OBS SF/LOW 25: CPT | Performed by: SURGERY

## 2024-03-17 PROCEDURE — 36415 COLL VENOUS BLD VENIPUNCTURE: CPT | Performed by: STUDENT IN AN ORGANIZED HEALTH CARE EDUCATION/TRAINING PROGRAM

## 2024-03-17 RX ORDER — NALOXONE HYDROCHLORIDE 0.4 MG/ML
0.4 INJECTION, SOLUTION INTRAMUSCULAR; INTRAVENOUS; SUBCUTANEOUS
Status: DISCONTINUED | OUTPATIENT
Start: 2024-03-17 | End: 2024-03-18 | Stop reason: HOSPADM

## 2024-03-17 RX ORDER — OXYCODONE HYDROCHLORIDE 5 MG/1
5-10 TABLET ORAL EVERY 4 HOURS PRN
Status: DISCONTINUED | OUTPATIENT
Start: 2024-03-17 | End: 2024-03-18 | Stop reason: HOSPADM

## 2024-03-17 RX ORDER — NALOXONE HYDROCHLORIDE 0.4 MG/ML
0.2 INJECTION, SOLUTION INTRAMUSCULAR; INTRAVENOUS; SUBCUTANEOUS
Status: DISCONTINUED | OUTPATIENT
Start: 2024-03-17 | End: 2024-03-18 | Stop reason: HOSPADM

## 2024-03-17 RX ADMIN — INSULIN ASPART 1 UNITS: 100 INJECTION, SOLUTION INTRAVENOUS; SUBCUTANEOUS at 17:07

## 2024-03-17 RX ADMIN — ACETAMINOPHEN 650 MG: 325 TABLET, FILM COATED ORAL at 05:03

## 2024-03-17 RX ADMIN — OXYCODONE HYDROCHLORIDE 5 MG: 5 TABLET ORAL at 05:02

## 2024-03-17 RX ADMIN — AMPICILLIN SODIUM, SULBACTAM SODIUM 3 G: 2; 1 INJECTION, POWDER, FOR SOLUTION INTRAMUSCULAR; INTRAVENOUS at 23:07

## 2024-03-17 RX ADMIN — OXYCODONE HYDROCHLORIDE 10 MG: 5 TABLET ORAL at 23:12

## 2024-03-17 RX ADMIN — OXYCODONE HYDROCHLORIDE 10 MG: 5 TABLET ORAL at 01:09

## 2024-03-17 RX ADMIN — AMPICILLIN SODIUM, SULBACTAM SODIUM 3 G: 2; 1 INJECTION, POWDER, FOR SOLUTION INTRAMUSCULAR; INTRAVENOUS at 04:54

## 2024-03-17 RX ADMIN — SODIUM CHLORIDE 1000 ML: 9 INJECTION, SOLUTION INTRAVENOUS at 03:42

## 2024-03-17 RX ADMIN — AMPICILLIN SODIUM, SULBACTAM SODIUM 3 G: 2; 1 INJECTION, POWDER, FOR SOLUTION INTRAMUSCULAR; INTRAVENOUS at 09:39

## 2024-03-17 RX ADMIN — INSULIN ASPART 1 UNITS: 100 INJECTION, SOLUTION INTRAVENOUS; SUBCUTANEOUS at 11:05

## 2024-03-17 RX ADMIN — ACETAMINOPHEN 650 MG: 325 TABLET, FILM COATED ORAL at 01:09

## 2024-03-17 RX ADMIN — ACETAMINOPHEN 650 MG: 325 TABLET, FILM COATED ORAL at 12:43

## 2024-03-17 RX ADMIN — OXYCODONE HYDROCHLORIDE 5 MG: 5 TABLET ORAL at 12:59

## 2024-03-17 RX ADMIN — OXYCODONE HYDROCHLORIDE 5 MG: 5 TABLET ORAL at 14:58

## 2024-03-17 RX ADMIN — AMPICILLIN SODIUM, SULBACTAM SODIUM 3 G: 2; 1 INJECTION, POWDER, FOR SOLUTION INTRAMUSCULAR; INTRAVENOUS at 15:45

## 2024-03-17 RX ADMIN — SODIUM CHLORIDE: 9 INJECTION, SOLUTION INTRAVENOUS at 14:58

## 2024-03-17 RX ADMIN — ACETAMINOPHEN 650 MG: 325 TABLET, FILM COATED ORAL at 23:12

## 2024-03-17 ASSESSMENT — ACTIVITIES OF DAILY LIVING (ADL)
ADLS_ACUITY_SCORE: 21
ADLS_ACUITY_SCORE: 21
ADLS_ACUITY_SCORE: 22
ADLS_ACUITY_SCORE: 21
ADLS_ACUITY_SCORE: 22
ADLS_ACUITY_SCORE: 22
ADLS_ACUITY_SCORE: 21
ADLS_ACUITY_SCORE: 22
ADLS_ACUITY_SCORE: 21
ADLS_ACUITY_SCORE: 22
ADLS_ACUITY_SCORE: 22
ADLS_ACUITY_SCORE: 21
ADLS_ACUITY_SCORE: 22
ADLS_ACUITY_SCORE: 21
ADLS_ACUITY_SCORE: 21
ADLS_ACUITY_SCORE: 22
ADLS_ACUITY_SCORE: 22
ADLS_ACUITY_SCORE: 21
ADLS_ACUITY_SCORE: 21
ADLS_ACUITY_SCORE: 22

## 2024-03-17 NOTE — PROGRESS NOTES
"General Surgery    S: Feels better than he did yesterday.  No issues with regular diet.    O:  Vital signs:  Temp: 99.2  F (37.3  C) Temp src: Oral BP: 134/74 Pulse: 110   Resp: 16 SpO2: 98 % O2 Device: None (Room air)        Estimated body mass index is 48 kg/m  as calculated from the following:    Height as of 3/8/24: 1.702 m (5' 7\").    Weight as of 3/8/24: 139 kg (306 lb 7 oz).  SURENDRA drain 45 cc over last 24 hours    Gen: AAOx3, NAD  Abd: Soft, obese.  SURENDRA drain with dark brown liquid though perhaps not quite as dark as yesterday    Labs/Imaging  Results for orders placed or performed during the hospital encounter of 03/13/24 (from the past 24 hour(s))   Glucose by meter   Result Value Ref Range    GLUCOSE BY METER POCT 134 (H) 70 - 99 mg/dL   Glucose by meter   Result Value Ref Range    GLUCOSE BY METER POCT 152 (H) 70 - 99 mg/dL   Glucose by meter   Result Value Ref Range    GLUCOSE BY METER POCT 142 (H) 70 - 99 mg/dL   Magnesium   Result Value Ref Range    Magnesium 2.1 1.7 - 2.3 mg/dL   Basic metabolic panel   Result Value Ref Range    Sodium 138 135 - 145 mmol/L    Potassium 3.6 3.4 - 5.3 mmol/L    Chloride 100 98 - 107 mmol/L    Carbon Dioxide (CO2) 25 22 - 29 mmol/L    Anion Gap 13 7 - 15 mmol/L    Urea Nitrogen 4.6 (L) 6.0 - 20.0 mg/dL    Creatinine 0.61 (L) 0.67 - 1.17 mg/dL    GFR Estimate >90 >60 mL/min/1.73m2    Calcium 8.7 8.6 - 10.0 mg/dL    Glucose 116 (H) 70 - 99 mg/dL   CBC with platelets   Result Value Ref Range    WBC Count 11.5 (H) 4.0 - 11.0 10e3/uL    RBC Count 4.70 4.40 - 5.90 10e6/uL    Hemoglobin 13.3 13.3 - 17.7 g/dL    Hematocrit 40.7 40.0 - 53.0 %    MCV 87 78 - 100 fL    MCH 28.3 26.5 - 33.0 pg    MCHC 32.7 31.5 - 36.5 g/dL    RDW 11.6 10.0 - 15.0 %    Platelet Count 447 150 - 450 10e3/uL   Glucose by meter   Result Value Ref Range    GLUCOSE BY METER POCT 109 (H) 70 - 99 mg/dL   Glucose by meter   Result Value Ref Range    GLUCOSE BY METER POCT 178 (H) 70 - 99 mg/dL           A: " 23-year-old male status post IR drain for pelvic abscess initially thought secondary to complicated diverticulitis however may be more related to inflammatory bowel disease with secondary inflammatory changes of the colon.  Most recent CT showed development of new abscess separate from where the drain had been placed  Patient doing well, stable    P: Plan for repeat CT tomorrow morning to help determine need for any further drains or need for transfer.  Due to potential for further intervention will make n.p.o. at midnight.    William Ly MD

## 2024-03-17 NOTE — PROGRESS NOTES
[facility]    Medicine Progress Note - Hospitalist Service    Date of Admission:  3/13/2024    Assessment & Plan   Mynor Rollins is a 23 year old male with past medical hx of T2DM and obesity who presents on 3/8/2024 with 1d hx of LLQ pain.      Diverticular abscess  E. Faecalis, enterococcus durans, and e. Coli soft tissue infection  C. Perfringens soft tissue infection  Sepsis   Presented with 1 day hx of LLQ pain with N/V/D, chills, fevers. CT abdomen showed 3.4 cm possible abcess. WBC 14.7. Tmax 100. Was started on cefepime and metronidazole. Repeat CT scan 3/12 showed abscess enlarging. Gen surg consulted. S/p IR guided drainage 3/13 at [facility]. Successful placement of 12 FR APD into left abdominal abscess with aspiration of ~100 mL of brown/purulent fluid.   Wound/ abscess cx growing clostridium perfringens/ enterococcus/ GNB. On 3/14 CRP worsened to 200 from 100. WBC 14.5k-worse. Pt was tachycardic in 120s . Gave 1L fluid bolus. Stopped  zosyn/ metronidazole and started PCN and clinda. Repeat CT 3/14 showed new pelvic abscess. Discussed with IR at [facility]. Would not do any acute interventions. Abscess not large enough  to be drained. Would repeat CT abd Monday and re-assess for drain need. Discussed with ID-recommend changing antbx to unasyn which would cover enterococcus/ GNB and clostridium. IR at [facility] recommended observation while remaining on IV antibiotics over the weekend with repeat imaging on 03/18 to monitor for interval improvement of the fluid collection. Drain output has been minimal the last 24 hours (~45 mL). Leukocytosis improving from 15 ?  11.5. Afebrile since 03/15.   - Continue IV Unasyn 3 g every 6 hours  - Plan for repeat CT A/P 3/18, potentially transfer to [facility] for IR drainage vs prolonged antibiotic course with repeat follow-up imaging as an outpatient  - Regular diet  - Surgery consulted, appreciate their recommendations  - Daily CBC and  "BMP  - IVF at 75 ml/hr  - Continue to monitor drain output     Type 2 diabetes mellitus   Initially diagnosed in 2020 when he presented with DKA and A1c was 11.3%.  A1c on admission 9.0. Some question of T1DM vs T2DM. Treated as T2DM outpatient and patient not using insulin at home and only on Ozempic.   -160  - Continue sliding scale insulin     Elevated anion gap without acidosis, resolved  Ketonemia, improved   Anion gap 16. Ketones 1.6 ?  2.1. VBG showed pH 7.39. CO2 38. Bicarb 23. Ketones likely elevated from decreased PO intake.            Diet: Regular Diet Adult    DVT Prophylaxis: Low Risk/Ambulatory with no VTE prophylaxis indicated  Damico Catheter: Not present  Lines: None     Cardiac Monitoring: None  Code Status: Full Code      Clinically Significant Risk Factors                        # DMII: A1C = 9.0 % (Ref range: <5.7 %) within past 6 months, PRESENT ON ADMISSION  # Severe Obesity: Estimated body mass index is 48 kg/m  as calculated from the following:    Height as of 3/8/24: 1.702 m (5' 7\").    Weight as of 3/8/24: 139 kg (306 lb 7 oz)., PRESENT ON ADMISSION            Disposition Plan      Expected Discharge Date: 03/19/2024      Destination: home with family              Fox ZhaoDO  Hospitalist Service  [facility]  Securely message with [employer] (more info)  Text page via [facility] Paging/Directory   ______________________________________________________________________    Interval History   No acute events overnight. Reports improvement in pain and improved appetite with regular diet. No nausea, vomiting, chills, or fever. Discussed plan about repeat imaging in the morning and then further discussion about next steps. No other acute concerns or complaints.     Physical Exam   Vital Signs: Temp: 98.6  F (37  C) Temp src: Oral BP: 127/70 Pulse: 104   Resp: 16 SpO2: 97 % O2 Device: None (Room air)    Weight: 0 lbs 0 oz    Constitutional: awake, alert, cooperative, " no apparent distress, and appears stated age  Respiratory: No increased work of breathing, good air exchange, clear to auscultation bilaterally, no crackles or wheezing  Cardiovascular: Normal apical impulse, regular rate and rhythm, normal S1 and S2, no S3 or S4, and no murmur noted  GI: No scars, normal bowel sounds, soft, non-distended, tender to palpation in LLQ, no masses palpated, no hepatosplenomegally  Skin: normal skin color, texture, turgor  Musculoskeletal: There is no redness, warmth, or swelling of the joints.  Full range of motion noted.  Motor strength is 5 out of 5 all extremities bilaterally.  Tone is normal.    Medical Decision Making       40 MINUTES SPENT BY ME on the date of service doing chart review, history, exam, documentation & further activities per the note.      Data     I have personally reviewed the following data over the past 24 hrs:    11.5 (H)  \   13.3   / 447     138 100 4.6 (L) /  178 (H)   3.6 25 0.61 (L) \       Imaging results reviewed over the past 24 hrs:   No results found for this or any previous visit (from the past 24 hour(s)).

## 2024-03-17 NOTE — PLAN OF CARE
Problem: Infection  Goal: Absence of Infection Signs and Symptoms  Outcome: Progressing     Problem: Pain Acute  Goal: Optimal Pain Control and Function  Outcome: Progressing  Intervention: Prevent or Manage Pain  Recent Flowsheet Documentation  Taken 3/17/2024 0034 by Beatrice Madison RN  Bowel Elimination Promotion:   adequate fluid intake promoted   ambulation promoted  Medication Review/Management: medications reviewed   Goal Outcome Evaluation:       Patient alert and oriented x 4. VSS on RA, pain controlled with tylenol and occasional oxycodone. Ambulates independently with without assistive device. Continuous IV fluids.  SURENDRA drain to bulb suction. Drainage Brown to [place]. Dressing clean, dry and intact.   Plan: Expected discharge 3/19 home with family.

## 2024-03-18 ENCOUNTER — APPOINTMENT (OUTPATIENT)
Dept: CT IMAGING | Facility: CLINIC | Age: 24
End: 2024-03-18
Attending: STUDENT IN AN ORGANIZED HEALTH CARE EDUCATION/TRAINING PROGRAM
Payer: MEDICAID

## 2024-03-18 ENCOUNTER — HOSPITAL ENCOUNTER (INPATIENT)
Facility: HOSPITAL | Age: 24
LOS: 4 days | Discharge: HOME | End: 2024-03-23
Attending: HOSPITALIST | Admitting: HOSPITALIST
Payer: MEDICAID

## 2024-03-18 VITALS
TEMPERATURE: 98.4 F | SYSTOLIC BLOOD PRESSURE: 130 MMHG | OXYGEN SATURATION: 96 % | HEART RATE: 109 BPM | DIASTOLIC BLOOD PRESSURE: 67 MMHG | RESPIRATION RATE: 16 BRPM

## 2024-03-18 DIAGNOSIS — K57.20 DIVERTICULITIS OF LARGE INTESTINE WITH ABSCESS WITHOUT BLEEDING: Primary | ICD-10-CM

## 2024-03-18 LAB
ANION GAP SERPL CALCULATED.3IONS-SCNC: 9 MMOL/L (ref 7–15)
BUN SERPL-MCNC: 4.5 MG/DL (ref 6–20)
CALCIUM SERPL-MCNC: 8.8 MG/DL (ref 8.6–10)
CHLORIDE SERPL-SCNC: 104 MMOL/L (ref 98–107)
CREAT SERPL-MCNC: 0.6 MG/DL (ref 0.67–1.17)
DEPRECATED HCO3 PLAS-SCNC: 26 MMOL/L (ref 22–29)
EGFRCR SERPLBLD CKD-EPI 2021: >90 ML/MIN/1.73M2
ERYTHROCYTE [DISTWIDTH] IN BLOOD BY AUTOMATED COUNT: 11.8 % (ref 10–15)
GLUCOSE BLDC GLUCOMTR-MCNC: 118 MG/DL (ref 70–99)
GLUCOSE BLDC GLUCOMTR-MCNC: 127 MG/DL (ref 70–99)
GLUCOSE BLDC GLUCOMTR-MCNC: 133 MG/DL (ref 70–99)
GLUCOSE BLDC GLUCOMTR-MCNC: 136 MG/DL (ref 70–99)
GLUCOSE BLDC GLUCOMTR-MCNC: 139 MG/DL (ref 70–99)
GLUCOSE SERPL-MCNC: 145 MG/DL (ref 70–99)
HCT VFR BLD AUTO: 37.7 % (ref 40–53)
HGB BLD-MCNC: 12.1 G/DL (ref 13.3–17.7)
MAGNESIUM SERPL-MCNC: 2.1 MG/DL (ref 1.7–2.3)
MCH RBC QN AUTO: 28.1 PG (ref 26.5–33)
MCHC RBC AUTO-ENTMCNC: 32.1 G/DL (ref 31.5–36.5)
MCV RBC AUTO: 88 FL (ref 78–100)
PLATELET # BLD AUTO: 463 10E3/UL (ref 150–450)
POTASSIUM SERPL-SCNC: 3.7 MMOL/L (ref 3.4–5.3)
RBC # BLD AUTO: 4.31 10E6/UL (ref 4.4–5.9)
SODIUM SERPL-SCNC: 139 MMOL/L (ref 135–145)
WBC # BLD AUTO: 12.5 10E3/UL (ref 4–11)

## 2024-03-18 PROCEDURE — 99222 1ST HOSP IP/OBS MODERATE 55: CPT | Performed by: HOSPITALIST

## 2024-03-18 PROCEDURE — 85027 COMPLETE CBC AUTOMATED: CPT | Performed by: STUDENT IN AN ORGANIZED HEALTH CARE EDUCATION/TRAINING PROGRAM

## 2024-03-18 PROCEDURE — 82962 GLUCOSE BLOOD TEST: CPT

## 2024-03-18 PROCEDURE — 258N000003 HC RX IP 258 OP 636: Performed by: STUDENT IN AN ORGANIZED HEALTH CARE EDUCATION/TRAINING PROGRAM

## 2024-03-18 PROCEDURE — 83735 ASSAY OF MAGNESIUM: CPT | Performed by: STUDENT IN AN ORGANIZED HEALTH CARE EDUCATION/TRAINING PROGRAM

## 2024-03-18 PROCEDURE — 99233 SBSQ HOSP IP/OBS HIGH 50: CPT | Performed by: SURGERY

## 2024-03-18 PROCEDURE — 250N000009 HC RX 250: Performed by: STUDENT IN AN ORGANIZED HEALTH CARE EDUCATION/TRAINING PROGRAM

## 2024-03-18 PROCEDURE — 36415 COLL VENOUS BLD VENIPUNCTURE: CPT | Performed by: STUDENT IN AN ORGANIZED HEALTH CARE EDUCATION/TRAINING PROGRAM

## 2024-03-18 PROCEDURE — 250N000013 HC RX MED GY IP 250 OP 250 PS 637: Performed by: STUDENT IN AN ORGANIZED HEALTH CARE EDUCATION/TRAINING PROGRAM

## 2024-03-18 PROCEDURE — 250N000011 HC RX IP 250 OP 636: Performed by: STUDENT IN AN ORGANIZED HEALTH CARE EDUCATION/TRAINING PROGRAM

## 2024-03-18 PROCEDURE — 250N000011 HC RX IP 250 OP 636: Performed by: INTERNAL MEDICINE

## 2024-03-18 PROCEDURE — 99207 PR NO BILLABLE SERVICE THIS VISIT: CPT | Performed by: STUDENT IN AN ORGANIZED HEALTH CARE EDUCATION/TRAINING PROGRAM

## 2024-03-18 PROCEDURE — 80048 BASIC METABOLIC PNL TOTAL CA: CPT | Performed by: STUDENT IN AN ORGANIZED HEALTH CARE EDUCATION/TRAINING PROGRAM

## 2024-03-18 PROCEDURE — 74177 CT ABD & PELVIS W/CONTRAST: CPT

## 2024-03-18 PROCEDURE — 250N000013 HC RX MED GY IP 250 OP 250 PS 637

## 2024-03-18 RX ORDER — AMPICILLIN AND SULBACTAM 2; 1 G/1; G/1
3 INJECTION, POWDER, FOR SOLUTION INTRAMUSCULAR; INTRAVENOUS EVERY 6 HOURS
Status: DISCONTINUED | OUTPATIENT
Start: 2024-03-18 | End: 2024-03-23 | Stop reason: HOSPADM

## 2024-03-18 RX ORDER — ONDANSETRON 2 MG/ML
4 INJECTION INTRAMUSCULAR; INTRAVENOUS EVERY 6 HOURS PRN
Status: CANCELLED | OUTPATIENT
Start: 2024-03-18

## 2024-03-18 RX ORDER — SODIUM CHLORIDE 9 MG/ML
INJECTION, SOLUTION INTRAVENOUS CONTINUOUS
Status: CANCELLED | OUTPATIENT
Start: 2024-03-18

## 2024-03-18 RX ORDER — HYDROMORPHONE HYDROCHLORIDE 1 MG/ML
.5-1 INJECTION, SOLUTION INTRAMUSCULAR; INTRAVENOUS; SUBCUTANEOUS
Status: CANCELLED | OUTPATIENT
Start: 2024-03-18

## 2024-03-18 RX ORDER — PROCHLORPERAZINE MALEATE 10 MG
10 TABLET ORAL EVERY 6 HOURS PRN
Status: CANCELLED | OUTPATIENT
Start: 2024-03-18

## 2024-03-18 RX ORDER — NALOXONE HYDROCHLORIDE 0.4 MG/ML
0.2 INJECTION, SOLUTION INTRAMUSCULAR; INTRAVENOUS; SUBCUTANEOUS
Status: CANCELLED | OUTPATIENT
Start: 2024-03-18

## 2024-03-18 RX ORDER — NALOXONE HYDROCHLORIDE 0.4 MG/ML
0.4 INJECTION, SOLUTION INTRAMUSCULAR; INTRAVENOUS; SUBCUTANEOUS
Status: DISCONTINUED | OUTPATIENT
Start: 2024-03-18 | End: 2024-03-20

## 2024-03-18 RX ORDER — NICOTINE POLACRILEX 4 MG
15-30 LOZENGE BUCCAL
Status: DISCONTINUED | OUTPATIENT
Start: 2024-03-18 | End: 2024-03-18

## 2024-03-18 RX ORDER — OXYCODONE HYDROCHLORIDE 5 MG/1
5-10 TABLET ORAL EVERY 4 HOURS PRN
Status: CANCELLED | OUTPATIENT
Start: 2024-03-18

## 2024-03-18 RX ORDER — CALCIUM CARBONATE 500 MG/1
1000 TABLET, CHEWABLE ORAL 4 TIMES DAILY PRN
Status: DISCONTINUED | OUTPATIENT
Start: 2024-03-18 | End: 2024-03-23 | Stop reason: HOSPADM

## 2024-03-18 RX ORDER — LIDOCAINE 40 MG/G
CREAM TOPICAL
Status: CANCELLED | OUTPATIENT
Start: 2024-03-18

## 2024-03-18 RX ORDER — OXYCODONE HYDROCHLORIDE 5 MG/1
5-10 TABLET ORAL EVERY 4 HOURS PRN
Status: DISCONTINUED | OUTPATIENT
Start: 2024-03-18 | End: 2024-03-23 | Stop reason: HOSPADM

## 2024-03-18 RX ORDER — DEXTROSE MONOHYDRATE 25 G/50ML
25-50 INJECTION, SOLUTION INTRAVENOUS
Status: DISCONTINUED | OUTPATIENT
Start: 2024-03-18 | End: 2024-03-23 | Stop reason: HOSPADM

## 2024-03-18 RX ORDER — ACETAMINOPHEN 325 MG/1
650 TABLET ORAL EVERY 4 HOURS PRN
Status: CANCELLED | OUTPATIENT
Start: 2024-03-18

## 2024-03-18 RX ORDER — NALOXONE HYDROCHLORIDE 0.4 MG/ML
0.2 INJECTION, SOLUTION INTRAMUSCULAR; INTRAVENOUS; SUBCUTANEOUS
Status: DISCONTINUED | OUTPATIENT
Start: 2024-03-18 | End: 2024-03-20

## 2024-03-18 RX ORDER — ACETAMINOPHEN 650 MG/1
650 SUPPOSITORY RECTAL EVERY 4 HOURS PRN
Status: DISCONTINUED | OUTPATIENT
Start: 2024-03-18 | End: 2024-03-23 | Stop reason: HOSPADM

## 2024-03-18 RX ORDER — AMPICILLIN AND SULBACTAM 2; 1 G/1; G/1
3 INJECTION, POWDER, FOR SOLUTION INTRAMUSCULAR; INTRAVENOUS EVERY 6 HOURS
Status: CANCELLED | OUTPATIENT
Start: 2024-03-18

## 2024-03-18 RX ORDER — HYDROMORPHONE HYDROCHLORIDE 1 MG/ML
.5-1 INJECTION, SOLUTION INTRAMUSCULAR; INTRAVENOUS; SUBCUTANEOUS
Status: DISCONTINUED | OUTPATIENT
Start: 2024-03-18 | End: 2024-03-23 | Stop reason: HOSPADM

## 2024-03-18 RX ORDER — SODIUM CHLORIDE 9 MG/ML
INJECTION, SOLUTION INTRAVENOUS CONTINUOUS
Status: DISCONTINUED | OUTPATIENT
Start: 2024-03-18 | End: 2024-03-21

## 2024-03-18 RX ORDER — DEXTROSE MONOHYDRATE 25 G/50ML
25-50 INJECTION, SOLUTION INTRAVENOUS
Status: DISCONTINUED | OUTPATIENT
Start: 2024-03-18 | End: 2024-03-18

## 2024-03-18 RX ORDER — DEXTROSE MONOHYDRATE 25 G/50ML
25-50 INJECTION, SOLUTION INTRAVENOUS
Status: CANCELLED | OUTPATIENT
Start: 2024-03-18

## 2024-03-18 RX ORDER — ONDANSETRON 4 MG/1
4 TABLET, ORALLY DISINTEGRATING ORAL EVERY 6 HOURS PRN
Status: DISCONTINUED | OUTPATIENT
Start: 2024-03-18 | End: 2024-03-23 | Stop reason: HOSPADM

## 2024-03-18 RX ORDER — AMOXICILLIN 250 MG
2 CAPSULE ORAL 2 TIMES DAILY PRN
Status: CANCELLED | OUTPATIENT
Start: 2024-03-18

## 2024-03-18 RX ORDER — ONDANSETRON 4 MG/1
4 TABLET, ORALLY DISINTEGRATING ORAL EVERY 6 HOURS PRN
Status: CANCELLED | OUTPATIENT
Start: 2024-03-18

## 2024-03-18 RX ORDER — NALOXONE HYDROCHLORIDE 0.4 MG/ML
0.4 INJECTION, SOLUTION INTRAMUSCULAR; INTRAVENOUS; SUBCUTANEOUS
Status: CANCELLED | OUTPATIENT
Start: 2024-03-18

## 2024-03-18 RX ORDER — NICOTINE POLACRILEX 4 MG
15-30 LOZENGE BUCCAL
Status: CANCELLED | OUTPATIENT
Start: 2024-03-18

## 2024-03-18 RX ORDER — LIDOCAINE 40 MG/G
CREAM TOPICAL
Status: DISCONTINUED | OUTPATIENT
Start: 2024-03-18 | End: 2024-03-20

## 2024-03-18 RX ORDER — PROCHLORPERAZINE MALEATE 10 MG
10 TABLET ORAL EVERY 6 HOURS PRN
Status: DISCONTINUED | OUTPATIENT
Start: 2024-03-18 | End: 2024-03-23 | Stop reason: HOSPADM

## 2024-03-18 RX ORDER — NICOTINE POLACRILEX 4 MG
15-30 LOZENGE BUCCAL
Status: DISCONTINUED | OUTPATIENT
Start: 2024-03-18 | End: 2024-03-23 | Stop reason: HOSPADM

## 2024-03-18 RX ORDER — PROCHLORPERAZINE 25 MG
25 SUPPOSITORY, RECTAL RECTAL EVERY 12 HOURS PRN
Status: CANCELLED | OUTPATIENT
Start: 2024-03-18

## 2024-03-18 RX ORDER — ACETAMINOPHEN 325 MG/1
650 TABLET ORAL EVERY 4 HOURS PRN
Status: DISCONTINUED | OUTPATIENT
Start: 2024-03-18 | End: 2024-03-23 | Stop reason: HOSPADM

## 2024-03-18 RX ORDER — IOPAMIDOL 755 MG/ML
100 INJECTION, SOLUTION INTRAVASCULAR ONCE
Status: COMPLETED | OUTPATIENT
Start: 2024-03-18 | End: 2024-03-18

## 2024-03-18 RX ORDER — AMOXICILLIN 250 MG
1 CAPSULE ORAL 2 TIMES DAILY PRN
Status: CANCELLED | OUTPATIENT
Start: 2024-03-18

## 2024-03-18 RX ORDER — ONDANSETRON 2 MG/ML
4 INJECTION INTRAMUSCULAR; INTRAVENOUS EVERY 6 HOURS PRN
Status: DISCONTINUED | OUTPATIENT
Start: 2024-03-18 | End: 2024-03-23 | Stop reason: HOSPADM

## 2024-03-18 RX ORDER — CALCIUM CARBONATE 500 MG/1
1000 TABLET, CHEWABLE ORAL 4 TIMES DAILY PRN
Status: CANCELLED | OUTPATIENT
Start: 2024-03-18

## 2024-03-18 RX ORDER — AMOXICILLIN 250 MG
2 CAPSULE ORAL 2 TIMES DAILY PRN
Status: DISCONTINUED | OUTPATIENT
Start: 2024-03-18 | End: 2024-03-23 | Stop reason: HOSPADM

## 2024-03-18 RX ORDER — AMOXICILLIN 250 MG
1 CAPSULE ORAL 2 TIMES DAILY PRN
Status: DISCONTINUED | OUTPATIENT
Start: 2024-03-18 | End: 2024-03-23 | Stop reason: HOSPADM

## 2024-03-18 RX ORDER — PROCHLORPERAZINE 25 MG
25 SUPPOSITORY, RECTAL RECTAL EVERY 12 HOURS PRN
Status: DISCONTINUED | OUTPATIENT
Start: 2024-03-18 | End: 2024-03-23 | Stop reason: HOSPADM

## 2024-03-18 RX ADMIN — AMPICILLIN SODIUM, SULBACTAM SODIUM 3 G: 2; 1 INJECTION, POWDER, FOR SOLUTION INTRAMUSCULAR; INTRAVENOUS at 05:16

## 2024-03-18 RX ADMIN — AMPICILLIN SODIUM, SULBACTAM SODIUM 3 G: 2; 1 INJECTION, POWDER, FOR SOLUTION INTRAMUSCULAR; INTRAVENOUS at 17:11

## 2024-03-18 RX ADMIN — ACETAMINOPHEN 650 MG: 325 TABLET, FILM COATED ORAL at 03:47

## 2024-03-18 RX ADMIN — OXYCODONE HYDROCHLORIDE 5 MG: 5 TABLET ORAL at 10:19

## 2024-03-18 RX ADMIN — AMPICILLIN SODIUM, SULBACTAM SODIUM 3 G: 2; 1 INJECTION, POWDER, FOR SOLUTION INTRAMUSCULAR; INTRAVENOUS at 10:19

## 2024-03-18 RX ADMIN — SODIUM CHLORIDE: 9 INJECTION, SOLUTION INTRAVENOUS at 20:08

## 2024-03-18 RX ADMIN — IOPAMIDOL 100 ML: 755 INJECTION, SOLUTION INTRAVENOUS at 06:32

## 2024-03-18 RX ADMIN — ACETAMINOPHEN 650 MG: 325 TABLET, FILM COATED ORAL at 16:02

## 2024-03-18 RX ADMIN — SODIUM CHLORIDE 1000 ML: 9 INJECTION, SOLUTION INTRAVENOUS at 05:17

## 2024-03-18 RX ADMIN — AMPICILLIN SODIUM AND SULBACTAM SODIUM 3 G: 2; 1 INJECTION, POWDER, FOR SOLUTION INTRAMUSCULAR; INTRAVENOUS at 22:55

## 2024-03-18 RX ADMIN — ACETAMINOPHEN 650 MG: 325 TABLET, FILM COATED ORAL at 10:18

## 2024-03-18 RX ADMIN — SODIUM CHLORIDE 74 ML: 9 INJECTION, SOLUTION INTRAVENOUS at 06:32

## 2024-03-18 RX ADMIN — OXYCODONE HYDROCHLORIDE 5 MG: 5 TABLET ORAL at 03:47

## 2024-03-18 RX ADMIN — OXYCODONE HYDROCHLORIDE 5 MG: 5 TABLET ORAL at 16:03

## 2024-03-18 ASSESSMENT — ACTIVITIES OF DAILY LIVING (ADL)
ADLS_ACUITY_SCORE: 21
ADLS_ACUITY_SCORE: 21
ADLS_ACUITY_SCORE: 22
ADLS_ACUITY_SCORE: 22
ADLS_ACUITY_SCORE: 35
ADLS_ACUITY_SCORE: 36
ADLS_ACUITY_SCORE: 22
ADLS_ACUITY_SCORE: 36
ADLS_ACUITY_SCORE: 21
ADLS_ACUITY_SCORE: 22
ADLS_ACUITY_SCORE: 21
ADLS_ACUITY_SCORE: 22
ADLS_ACUITY_SCORE: 36
ADLS_ACUITY_SCORE: 22

## 2024-03-18 NOTE — H&P
[facility]    History and Physical - Hospitalist Service       Date of Admission:  (Not on file)    Assessment & Plan      Mynor Rollins is a 23 year old male admitted on 3/18/2024.. He has past medical history of obesity and diabetes who was initially admitted to [facility] on 3/8/2024 with left lower quadrant pain.  Workup with CT abdomen on admission showed 3.4 cm possible abscess favoring diverticular abscess..  Patient was febrile with leukocytosis and was started on cefepime and Flagyl.  Patient had a repeat abdomen on 3/12 which showed enlarging abscess and underwent drain placement by IR on 3/13.  Patient had another CT scan on 3/14 which showed interval decrease in the size of the left lower quadrant abscess.  However a new pelvic abscess was seen.  Gm stain and cx of abscess growing  Enterococcus faecalis, Enterococcus Sylwia's, E. coli and Clostridium perfringens and antibiotics changed to Unasyn.  Patient had follow-up CT abdomen on 3/18 which shows pelvic abscess continuing to increase in size.  Patient is being transferred to [facility] for further IR interventions.    Sepsis secondary to diverticular abscess  Pelvic abscess  -Patient presented with left lower quadrant pain along with other GI symptoms.  Workup revealed patient had sepsis with diverticular abscess seen on the CT scan.  Patient underwent a drain placement on 3/13.  Abscess culture growing Clostridium perfringens and Enterococcus.  -Follow-up CT scan shows decreasing in the size of diverticular abscess but developing of new pelvic abscess which is continued to increase in size.  -Gram stain and culture from abscess growing Enterococcus faecalis, Enterococcus Mcbride, E. coli and Clostridium perfringens  -Patient currently on IV Unasyn every 6 hours  -Consulted IR for further intervention  -Consult general surgery and ID  -Keep n.p.o., IVF, pain meds and antiemetics as needed  -Monitor labs CBC, BMP,  "CRP      Diabetes mellitus with hyperglycemia  -Poorly controlled with A1c of 9  -Home regimen includes Ozempic, hold for now  -sliding scale insulin, Monitor BS  -On hypoglycemia protocol            Diet: Clear Liquid Diet  NPO per Anesthesia Guidelines for Procedure/Surgery Except for: Meds  DVT Prophylaxis: Ambulate every shift  Damico Catheter: Not present  Lines: None     Cardiac Monitoring: None  Code Status: Full Code, discussed with patient on admission    Clinically Significant Risk Factors Present on Admission                      # DMII: A1C = 9.0 % (Ref range: <5.7 %) within past 6 months    # Severe Obesity: Estimated body mass index is 48 kg/m  as calculated from the following:    Height as of 3/8/24: 1.702 m (5' 7\").    Weight as of 3/8/24: 139 kg (306 lb 7 oz).              Disposition Plan           Yu Whaley MD  Hospitalist Service  [facility]  Securely message with [employer] (more info)  Text page via [employer] Paging/Directory     ______________________________________________________________________    Chief Complaint   Abd pain    History is obtained from the patient    History of Present Illness   Mynor Rollins is a 23 year old male with past medical history of obesity and diabetes who was initially admitted to [facility] on 3/8/2024 with left lower quadrant pain.  Workup with CT abdomen on admission showed 3.4 cm possible abscess favoring diverticular abscess..  Patient was febrile with leukocytosis and was started on cefepime and Flagyl.  Patient had a repeat abdomen on 3/12 which showed enlarging abscess and underwent drain placement by IR on 3/13.  Patient had another CT scan on 3/14 which showed interval decrease in the size of the left lower quadrant abscess.  However a new pelvic abscess was seen.  Gm stain and cx of abscess growing  Enterococcus faecalis, Enterococcus Sylwia's, E. coli and Clostridium perfringens and antibiotics changed to Unasyn.  Patient " had follow-up CT abdomen on 3/18 which shows pelvic abscess continuing to increase in size.  Patient is being transferred to [facility] for further IR interventions.            Past Medical History    Reviewed and non contributory    Past Surgical History   Past Surgical History:   Procedure Laterality Date    NO HISTORY OF SURGERY         Prior to Admission Medications   Prior to Admission Medications   Prescriptions Last Dose Informant Patient Reported? Taking?   OZEMPIC, 1 MG/DOSE, 4 MG/3ML pen  Self Yes No   Sig: Inject 1 mg Subcutaneous every 7 days On Monday.   blood glucose (NO BRAND SPECIFIED) lancets standard  Self No No   Sig: Use to test blood sugar 4 times daily or as directed.   blood glucose (NO BRAND SPECIFIED) test strip  Self No No   Sig: Use to test blood sugar 4 times daily or as directed.   blood glucose monitoring (NO BRAND SPECIFIED) meter device kit  Self No No   Sig: Use to test blood sugar 4 times daily or as directed.   insulin pen needle (32G X 4 MM) 32G X 4 MM miscellaneous  Self No No   Sig: Use 5 pen needles daily or as directed.   Patient taking differently: Use 1 pen needle weekly or as directed.   sodium chloride, PF, 0.9% PF flush   No No   Sig: Irrigate with 10 mLs as directed daily      Facility-Administered Medications: None        Review of Systems    The 10 point Review of Systems is negative other than noted in the HPI or here.      Physical Exam   Vital Signs: Temp: 99.3  F (37.4  C) Temp src: Oral BP: 137/83 Pulse: 112   Resp: 18 SpO2: 95 % O2 Device: None (Room air)    Weight: 0 lbs 0 oz    General: Pleasant, NAD  HEENT:EOMI, AT,NC  CVS:RRR, no edema  RS:CTAB  Abd: Soft, LLQ tenderness+, drain +.  Neurology:Grossly normal  Psy:Approrpiate affect      Medical Decision Making       >55  MINUTES SPENT BY ME on the date of service doing chart review, history, exam, documentation & further activities per the note.  MANAGEMENT DISCUSSED with the following over the past 24 hours:  Patient       Data     I have personally reviewed the following data over the past 24 hrs:    12.5 (H)  \   12.1 (L)   / 463 (H)     139 104 4.5 (L) /  136 (H)   3.7 26 0.60 (L) \

## 2024-03-18 NOTE — PROGRESS NOTES
P4 unit updated that pt is leaving the building with ambulance escort. He ambulated to cart, saline locked. Chirag drain with small purulent drainage in bulb. Johan britton is following down there.

## 2024-03-18 NOTE — PROGRESS NOTES
Subjective:   Seen and examined.  Patient has no new complaints.  Denies nausea or vomiting      I/O last 3 completed shifts:  In: 3017.25 [P.O.:800; I.V.:2217.25]  Out: 60 [Drains:65]     Current Outpatient Medications   Medication Sig Dispense Refill    sodium chloride, PF, 0.9% PF flush Irrigate with 10 mLs as directed daily 150 mL 3         ROUTINE IP LABS (Last four results)  BMP  Recent Labs   Lab 03/18/24  1151 03/18/24  0727 03/18/24  0535 03/18/24  0225 03/17/24  1057 03/17/24  0730 03/15/24  0726 03/15/24  0525 03/14/24  0722 03/14/24  0508   NA  --   --  139  --   --  138  --  133*  --  132*   POTASSIUM  --   --  3.7  --   --  3.6  --  3.9  --  3.5   CHLORIDE  --   --  104  --   --  100  --  99  --  97*   CAROL  --   --  8.8  --   --  8.7  --  8.5*  --  8.9   CO2  --   --  26  --   --  25  --  22  --  22   BUN  --   --  4.5*  --   --  4.6*  --  6.4  --  7.2   CR  --   --  0.60*  --   --  0.61*  --  0.67  --  0.66*   * 118* 145* 139*   < > 109*  116*   < > 132*   < > 171*    < > = values in this interval not displayed.     CBC  Recent Labs   Lab 03/18/24  0535 03/17/24  0730 03/15/24  0525 03/14/24  0508   WBC 12.5* 11.5* 15.0* 14.5*   RBC 4.31* 4.70 4.50 4.95   HGB 12.1* 13.3 12.8* 13.8   HCT 37.7* 40.7 38.4* 42.9   MCV 88 87 85 87   MCH 28.1 28.3 28.4 27.9   MCHC 32.1 32.7 33.3 32.2   RDW 11.8 11.6 11.5 11.6   * 447 407 431     INR  Recent Labs   Lab 03/13/24  1020   INR 1.20*            Tmax: 99.2  --->  Tcurrent: 98.4   B/P: 130/82  P: 100  R: 16  SpO2:98%        EXAM  AOX4 NAD  Abdomen: Mildly distended, obese, TTP LLQ  No CCE    CT Abdomen/Pelvis:  INDICATION: Intra abdominal abscess  COMPARISON: 03/14/2024, 03/13/2024, 03/12/2024, and 03/08/2024.  TECHNIQUE: CT scan of the abdomen and pelvis was performed following injection of IV contrast. Multiplanar reformats were obtained. Dose reduction techniques were used.  CONTRAST: 100 mL Isovue 370     FINDINGS:   LOWER CHEST: Mild bibasilar  atelectasis.     HEPATOBILIARY: Advanced diffuse hepatic steatosis. Normal gallbladder and bile ducts.     PANCREAS: Normal.     SPLEEN: Normal.     ADRENAL GLANDS: Normal.     KIDNEYS/BLADDER: Normal.     BOWEL: Left pelvic percutaneous drainage catheter remains in position with pigtail terminating near the proximal sigmoid colon. A persistent 3.7 cm complex collection of fluid, small amount of gas, and heterogeneous density solid material immediately   posterior to the catheter pigtail decreased in size from 4.4 cm when retrospectively measured in a similar fashion on the prior study of 03/14/2024 (images 165 - 183 of axial series 3). As before, there is a sinus tract between the proximal sigmoid colon   colon and the collection. Rim-enhancing fluid in the inferior pelvis superior to the urinary bladder and separate from the drainage catheter has increased in volume to 13.4 x 9.3 x 5.8 cm and now has a more loculated configuration concerning for abscess   (images 189 - 221 of axial series 3). There has been progressive peritoneal thickening in the lower abdomen and pelvis associated with mesenteric inflammatory fat stranding. No bowel obstruction. Normal appendix.     LYMPH NODES: Normal.     VASCULATURE: Unremarkable.     PELVIC ORGANS: Normal.     MUSCULOSKELETAL: Normal.                                                                      IMPRESSION:   1.  13.4 x 9.3 x 5.8 cm collection of rim-enhancing fluid in the pelvis superior to the bladder and separate from the left pelvic percutaneous drainage catheter has increased in volume and now has a more loculated configuration concerning for abscess.  2.  Smaller complex collection near the proximal sigmoid colon has mildly decreased in size from 03/14/2024. The percutaneous drainage catheter terminates immediately anterior to the collection.  3.  Peritonitis appears to have worsened from previous.    A/P:   Continued intra-abdominal abscess--Diverticular  disease vs IBD  - ABX: Unasyn  - Pending transfer to [facility] for IR drain  - Explained to patient, he is agreeable.  - Discussed with Dr. Jacob  - Reviewed CT     Han Wahl DO on 3/18/2024 at 2:40 PM

## 2024-03-18 NOTE — PROGRESS NOTES
Md placed npo order not knowing patient couldn't get a ride out of here until 11pm-1am. Small amt of ice chips given to patient. Explained we dont want to give you food and not be able to do procedure if he goes to IR.

## 2024-03-18 NOTE — PLAN OF CARE
Problem: Pain Acute  Goal: Optimal Pain Control and Function  Outcome: Progressing  Intervention: Prevent or Manage Pain  Recent Flowsheet Documentation  Taken 3/18/2024 7907 by Beatrice Madison RN  Bowel Elimination Promotion:   adequate fluid intake promoted   ambulation promoted  Medication Review/Management: medications reviewed   Goal Outcome Evaluation:       Patient alert and oriented x 4. VSS on RA, pain controlled oral pain medications as needed Ambulates independently with without assistive device..  SUERNDRA drain to bulb suction with thick tan output. Flushed with 10cc saline as per orders. Dressing clean dry and intact.   Plan: CT AM of 3/18 to plan for possible transfer to [facility] for IR drainage vs continued antibiotics at home.

## 2024-03-18 NOTE — PROGRESS NOTES
Report called to RN on 4P at [facility], explained IV abx are due now. She will give when he arrives there. Pt alert and oriented. Vs stable. Room air. Oxycodone/tylenol just given for pain during transport. Mother is at his side.  Iv in left lower arm saline locked. IV fluids stopped.

## 2024-03-18 NOTE — DISCHARGE SUMMARY
"[facility]  Hospitalist Discharge Summary      Date of Admission:  3/13/2024  Date of Discharge:  3/18/2024  Discharging Provider: Fox Zhao DO  Discharge Service: Hospitalist Service    Discharge Diagnoses   Diverticular abscess vs IBD  E. Faecalis, enterococcus durans, and e. Coli soft tissue infection  C. Perfringens soft tissue infection  Sepsis  Type 2 diabetes mellitus   Morbid obesity     Clinically Significant Risk Factors     # DMII: A1C = 9.0 % (Ref range: <5.7 %) within past 6 months  # Severe Obesity: Estimated body mass index is 48 kg/m  as calculated from the following:    Height as of 3/8/24: 1.702 m (5' 7\").    Weight as of 3/8/24: 139 kg (306 lb 7 oz).       Follow-ups Needed After Discharge       Unresulted Labs Ordered in the Past 30 Days of this Admission       Date and Time Order Name Status Description    3/13/2024 11:46 AM Anaerobic Bacterial Culture Routine Preliminary         These results will be followed up by [facility].    Discharge Disposition   Discharged to [facility] for additional IR procedure.   Condition at discharge: Stable    Hospital Course   Mynor Rollins is a 23 year old male with past medical hx of T2DM and obesity who presents on 3/8/2024 with 1d hx of LLQ pain.      Diverticular abscess vs undiagnosed IBD w/ fistulization   E. Faecalis, enterococcus durans, and e. Coli soft tissue infection  C. Perfringens soft tissue infection  Sepsis   Presented with 1 day hx of LLQ pain with N/V/D, chills, fevers. CT abdomen showed 3.4 cm possible abcess. WBC 14.7. Tmax 100. Was started on cefepime and metronidazole. Repeat CT scan 3/12 showed abscess enlarging. Gen surg consulted. S/p IR guided drainage 3/13 at [facility]. Successful placement of 12 FR APD into left abdominal abscess with aspiration of ~100 mL of brown/purulent fluid.   Wound/ abscess cx growing clostridium perfringens/ enterococcus/ GNB. On 3/14 CRP worsened to 200 from 100. WBC " 14.5k-worse. Pt was tachycardic in 120s . Gave 1L fluid bolus. Stopped  zosyn/ metronidazole and started PCN and clinda. Repeat CT 3/14 showed new pelvic abscess. Discussed with IR at [facility]. Would not do any acute interventions. Abscess not large enough  to be drained. Would repeat CT abd Monday and re-assess for drain need. Discussed with ID-recommend changing antbx to unasyn which would cover enterococcus/ GNB and clostridium. IR at [facility] recommended observation while remaining on IV antibiotics over the weekend with repeat imaging on 03/18 to monitor for interval improvement of the fluid collection. Drain output has been minimal the last 24 hours (~45 mL). Leukocytosis improving from 15 ?  11.5. Afebrile since 03/15. Leukocytosis slightly worse 12.5. Repeat CT A/P (03/18) with 13.4 x 9.3 x 5.8 cm collection of rim-enhancing fluid in the pelvis superior to the bladder and separate from the left pelvic percutaneous drainage catheter, which has increased in volume and now has a more loculated configuration concerning for abscess. Discussed case with [facility] medicine team who is willing to accept patient for transfer and plan to transfer by 4 pm today.   - Transfer to [facility] Medicine team to reach out to IR team for potential procedure. Patient has remained off anticoagulation and last meal was noon 03/18.   - Continue IV Unasyn 3 g every 6 hours  - Making NPO out of anticipation of potential procedure  - IVF at 75 ml/hr  - Continue to monitor drain output     Type 2 diabetes mellitus   Initially diagnosed in 2020 when he presented with DKA and A1c was 11.3%.  A1c on admission 9.0. Some question of T1DM vs T2DM. Treated as T2DM outpatient and patient not using insulin at home and only on Ozempic. -160.  - Continue sliding scale insulin, minimal use while inpatient (1 U Aspart daily). Will discontinue prior to transfer.      Elevated anion gap without acidosis,  resolved  Ketonemia, improved   Anion gap 16. Ketones 1.6 ?  2.1. VBG showed pH 7.39. CO2 38. Bicarb 23. Ketones likely elevated from decreased PO intake.        Consultations This Hospital Stay   SURGERY GENERAL IP CONSULT  SURGERY GENERAL IP CONSULT    Code Status   Full Code    Time Spent on this Encounter   IFox DO, personally saw the patient today and spent greater than 30 minutes discharging this patient.       Fox Zhao DO  [facility] SURGICAL  [address] [address]  Phone: [phone]  Fax: [phone]  ______________________________________________________________________    Physical Exam   Vital Signs: Temp: 98.4  F (36.9  C) Temp src: Oral BP: 130/82 Pulse: 100   Resp: 16 SpO2: 98 % O2 Device: None (Room air)    Weight: 0 lbs 0 oz    Constitutional: awake, alert, cooperative, no apparent distress, and appears stated age  Respiratory: No increased work of breathing, good air exchange, clear to auscultation bilaterally, no crackles or wheezing  Cardiovascular: Normal apical impulse, regular rate and rhythm, normal S1 and S2, no S3 or S4, and no murmur noted  GI: No scars, normal bowel sounds, soft, non-distended, tender to palpation in LLQ, no masses palpated, no hepatosplenomegally  Skin: normal skin color, texture, turgor  Musculoskeletal: There is no redness, warmth, or swelling of the joints.  Full range of motion noted.  Motor strength is 5 out of 5 all extremities bilaterally.  Tone is normal.       Primary Care Physician   Physician No Ref-Primary    Discharge Orders   No discharge procedures on file.    Significant Results and Procedures   Most Recent 3 CBC's:  Recent Labs   Lab Test 03/18/24  0535 03/17/24  0730 03/15/24  0525   WBC 12.5* 11.5* 15.0*   HGB 12.1* 13.3 12.8*   MCV 88 87 85   * 447 407     Most Recent 3 BMP's:  Recent Labs   Lab Test 03/18/24  1151 03/18/24  0727 03/18/24  0535 03/17/24  1057 03/17/24  0730  03/15/24  0726 03/15/24  0525   NA  --   --  139  --  138  --  133*   POTASSIUM  --   --  3.7  --  3.6  --  3.9   CHLORIDE  --   --  104  --  100  --  99   CO2  --   --  26  --  25  --  22   BUN  --   --  4.5*  --  4.6*  --  6.4   CR  --   --  0.60*  --  0.61*  --  0.67   ANIONGAP  --   --  9  --  13  --  12   CAROL  --   --  8.8  --  8.7  --  8.5*   * 118* 145*   < > 109*  116*   < > 132*    < > = values in this interval not displayed.   ,   Results for orders placed or performed during the hospital encounter of 03/13/24   CT Abd/Pelvis w Contrast*    Narrative    CT ABDOMEN PELVIS WITH CONTRAST 3/14/2024 3:45 PM    CLINICAL HISTORY: Diverticular abscess-clostridium perfringens  positive-rule out gas.    TECHNIQUE: CT scan of the abdomen and pelvis was performed following  injection of IV contrast. Multiplanar reformats were obtained. Dose  reduction techniques were used.  CONTRAST: 100mL Isovue 370    COMPARISON: 3/12/2024.    FINDINGS:   LOWER CHEST: Benign punctate calcified pulmonary granuloma in the  anterior left lower lobe. No follow-up is necessary.    HEPATOBILIARY: Low attenuation is seen throughout the liver  parenchyma, compatible with fatty infiltration. No suspicious  appearing hepatic observations. No signs of cholelithiasis, acute  cholecystitis, or bile duct dilation. There is a tiny focus of  extraluminal gas in the upper abdomen anterior to the left hepatic  lobe (3-31), which is likely secondary to percutaneous catheter  placement.    PANCREAS: Normal.    SPLEEN: Normal.    ADRENAL GLANDS: Normal.    KIDNEYS/BLADDER: Normal.    BOWEL: There is mild wall thickening of the distal descending and  rectosigmoid colon, which is decompressed. No signs of bowel  obstruction. No pneumatosis. With the exception of a single tiny focus  of gas in the upper abdomen. No evidence of significant  pneumoperitoneum to suggest viscus perforation. There is a residual  high density collection in the left lower  quadrant corresponding to  the previously described abscess which is further discussed below. As  before, this residual collection abuts the sigmoid and distal  descending colon with a possible sinus tract again noted (series 3,  image 177).    PELVIC ORGANS: Normal.    ADDITIONAL FINDINGS: A left lower quadrant percutaneous pigtail  drainage catheter is present. The catheter terminates anterior to a  residual complex collection, which has decreased in size in the  interim now measuring 3.8 x 3.7 cm, previously 5.7 x 5.2 cm. The  contents of this residual complex collection are higher density than  expected for simple fluid. No residual drainable fluid collection is  seen in this region at this time. There is, however, a new lobulated  fluid collection in the central low pelvis with subtle thin peripheral  enhancement and no internal gas measuring 11.6 x 8.2 cm (3-208).    MUSCULOSKELETAL: Normal.      Impression    IMPRESSION:   1.  Interval decrease in size of a previously seen left lower quadrant  abscess. Left lower quadrant percutaneous drainage catheter terminates  anterior to a small residual gas and high density collection. No  residual drainable fluid collection is seen in this region at this  time. Previously described possible sinus tract between the residual  collection and adjacent colon appears unchanged.  2.  New fluid collection in the central low pelvis with thin  peripheral enhancement and no internal gas, suspicious for a  developing abscess.  3.  Mild to moderate fat stranding and edema in the low abdomen and  pelvis, likely reactive.  4.  Mild wall thickening of the distal descending and rectosigmoid  colon, nonspecific. No signs of bowel obstruction.  5.  Hepatic steatosis.     LUIS HERMAN MD         SYSTEM ID:  [id number]   CT Abd/Pelvis w Contrast*    Narrative    EXAM: CT ABDOMEN PELVIS W CONTRAST  LOCATION: [facility]  DATE: 3/18/2024    INDICATION: Intra  abdominal abscess  COMPARISON: 03/14/2024, 03/13/2024, 03/12/2024, and 03/08/2024.  TECHNIQUE: CT scan of the abdomen and pelvis was performed following injection of IV contrast. Multiplanar reformats were obtained. Dose reduction techniques were used.  CONTRAST: 100 mL Isovue 370    FINDINGS:   LOWER CHEST: Mild bibasilar atelectasis.    HEPATOBILIARY: Advanced diffuse hepatic steatosis. Normal gallbladder and bile ducts.    PANCREAS: Normal.    SPLEEN: Normal.    ADRENAL GLANDS: Normal.    KIDNEYS/BLADDER: Normal.    BOWEL: Left pelvic percutaneous drainage catheter remains in position with pigtail terminating near the proximal sigmoid colon. A persistent 3.7 cm complex collection of fluid, small amount of gas, and heterogeneous density solid material immediately   posterior to the catheter pigtail decreased in size from 4.4 cm when retrospectively measured in a similar fashion on the prior study of 03/14/2024 (images 165 - 183 of axial series 3). As before, there is a sinus tract between the proximal sigmoid colon   colon and the collection. Rim-enhancing fluid in the inferior pelvis superior to the urinary bladder and separate from the drainage catheter has increased in volume to 13.4 x 9.3 x 5.8 cm and now has a more loculated configuration concerning for abscess   (images 189 - 221 of axial series 3). There has been progressive peritoneal thickening in the lower abdomen and pelvis associated with mesenteric inflammatory fat stranding. No bowel obstruction. Normal appendix.    LYMPH NODES: Normal.    VASCULATURE: Unremarkable.    PELVIC ORGANS: Normal.    MUSCULOSKELETAL: Normal.      Impression    IMPRESSION:   1.  13.4 x 9.3 x 5.8 cm collection of rim-enhancing fluid in the pelvis superior to the bladder and separate from the left pelvic percutaneous drainage catheter has increased in volume and now has a more loculated configuration concerning for abscess.  2.  Smaller complex collection near the proximal  sigmoid colon has mildly decreased in size from 03/14/2024. The percutaneous drainage catheter terminates immediately anterior to the collection.  3.  Peritonitis appears to have worsened from previous.       Discharge Medications   Current Discharge Medication List        START taking these medications    Details   sodium chloride, PF, 0.9% PF flush Irrigate with 10 mLs as directed daily  Qty: 150 mL, Refills: 3    Associated Diagnoses: Diverticulitis of large intestine with abscess without bleeding           CONTINUE these medications which have NOT CHANGED    Details   blood glucose (NO BRAND SPECIFIED) lancets standard Use to test blood sugar 4 times daily or as directed.  Qty: 200 each, Refills: 0    Associated Diagnoses: Diabetic ketoacidosis without coma associated with diabetes mellitus due to underlying condition (H)      blood glucose (NO BRAND SPECIFIED) test strip Use to test blood sugar 4 times daily or as directed.  Qty: 200 strip, Refills: 1    Associated Diagnoses: Diabetic ketoacidosis without coma associated with diabetes mellitus due to underlying condition (H)      blood glucose monitoring (NO BRAND SPECIFIED) meter device kit Use to test blood sugar 4 times daily or as directed.  Qty: 1 kit, Refills: 0    Associated Diagnoses: Diabetic ketoacidosis without coma associated with diabetes mellitus due to underlying condition (H)      insulin pen needle (32G X 4 MM) 32G X 4 MM miscellaneous Use 5 pen needles daily or as directed.  Qty: 200 each, Refills: 1    Associated Diagnoses: Diabetic ketoacidosis without coma associated with diabetes mellitus due to underlying condition (H)      OZEMPIC, 1 MG/DOSE, 4 MG/3ML pen Inject 1 mg Subcutaneous every 7 days On Monday.           Allergies   No Known Allergies

## 2024-03-19 ENCOUNTER — APPOINTMENT (OUTPATIENT)
Dept: CT IMAGING | Facility: HOSPITAL | Age: 24
End: 2024-03-19
Attending: STUDENT IN AN ORGANIZED HEALTH CARE EDUCATION/TRAINING PROGRAM
Payer: MEDICAID

## 2024-03-19 PROBLEM — K57.20 DIVERTICULITIS OF LARGE INTESTINE WITH ABSCESS: Status: ACTIVE | Noted: 2024-03-19

## 2024-03-19 LAB
ANION GAP SERPL CALCULATED.3IONS-SCNC: 13 MMOL/L (ref 7–15)
BUN SERPL-MCNC: 4.2 MG/DL (ref 6–20)
CALCIUM SERPL-MCNC: 8.9 MG/DL (ref 8.6–10)
CHLORIDE SERPL-SCNC: 101 MMOL/L (ref 98–107)
CREAT SERPL-MCNC: 0.58 MG/DL (ref 0.67–1.17)
DEPRECATED HCO3 PLAS-SCNC: 25 MMOL/L (ref 22–29)
EGFRCR SERPLBLD CKD-EPI 2021: >90 ML/MIN/1.73M2
ERYTHROCYTE [DISTWIDTH] IN BLOOD BY AUTOMATED COUNT: 11.6 % (ref 10–15)
GLUCOSE BLDC GLUCOMTR-MCNC: 100 MG/DL (ref 70–99)
GLUCOSE BLDC GLUCOMTR-MCNC: 112 MG/DL (ref 70–99)
GLUCOSE BLDC GLUCOMTR-MCNC: 112 MG/DL (ref 70–99)
GLUCOSE BLDC GLUCOMTR-MCNC: 127 MG/DL (ref 70–99)
GLUCOSE BLDC GLUCOMTR-MCNC: 97 MG/DL (ref 70–99)
GLUCOSE SERPL-MCNC: 124 MG/DL (ref 70–99)
HCT VFR BLD AUTO: 40.5 % (ref 40–53)
HGB BLD-MCNC: 12.9 G/DL (ref 13.3–17.7)
MAGNESIUM SERPL-MCNC: 2.1 MG/DL (ref 1.7–2.3)
MCH RBC QN AUTO: 27.7 PG (ref 26.5–33)
MCHC RBC AUTO-ENTMCNC: 31.9 G/DL (ref 31.5–36.5)
MCV RBC AUTO: 87 FL (ref 78–100)
PLATELET # BLD AUTO: 492 10E3/UL (ref 150–450)
POTASSIUM SERPL-SCNC: 3.6 MMOL/L (ref 3.4–5.3)
RBC # BLD AUTO: 4.66 10E6/UL (ref 4.4–5.9)
SODIUM SERPL-SCNC: 139 MMOL/L (ref 135–145)
WBC # BLD AUTO: 10.8 10E3/UL (ref 4–11)

## 2024-03-19 PROCEDURE — 258N000003 HC RX IP 258 OP 636: Performed by: STUDENT IN AN ORGANIZED HEALTH CARE EDUCATION/TRAINING PROGRAM

## 2024-03-19 PROCEDURE — 250N000011 HC RX IP 250 OP 636: Performed by: RADIOLOGY

## 2024-03-19 PROCEDURE — 82962 GLUCOSE BLOOD TEST: CPT

## 2024-03-19 PROCEDURE — 250N000011 HC RX IP 250 OP 636: Performed by: STUDENT IN AN ORGANIZED HEALTH CARE EDUCATION/TRAINING PROGRAM

## 2024-03-19 PROCEDURE — 87070 CULTURE OTHR SPECIMN AEROBIC: CPT | Performed by: RADIOLOGY

## 2024-03-19 PROCEDURE — 80048 BASIC METABOLIC PNL TOTAL CA: CPT | Performed by: STUDENT IN AN ORGANIZED HEALTH CARE EDUCATION/TRAINING PROGRAM

## 2024-03-19 PROCEDURE — 36415 COLL VENOUS BLD VENIPUNCTURE: CPT | Performed by: STUDENT IN AN ORGANIZED HEALTH CARE EDUCATION/TRAINING PROGRAM

## 2024-03-19 PROCEDURE — 99254 IP/OBS CNSLTJ NEW/EST MOD 60: CPT | Performed by: INTERNAL MEDICINE

## 2024-03-19 PROCEDURE — 0W9J3ZZ DRAINAGE OF PELVIC CAVITY, PERCUTANEOUS APPROACH: ICD-10-PCS | Performed by: RADIOLOGY

## 2024-03-19 PROCEDURE — 99221 1ST HOSP IP/OBS SF/LOW 40: CPT | Performed by: SURGERY

## 2024-03-19 PROCEDURE — 83735 ASSAY OF MAGNESIUM: CPT | Performed by: STUDENT IN AN ORGANIZED HEALTH CARE EDUCATION/TRAINING PROGRAM

## 2024-03-19 PROCEDURE — 99233 SBSQ HOSP IP/OBS HIGH 50: CPT | Performed by: INTERNAL MEDICINE

## 2024-03-19 PROCEDURE — 99152 MOD SED SAME PHYS/QHP 5/>YRS: CPT

## 2024-03-19 PROCEDURE — 85027 COMPLETE CBC AUTOMATED: CPT | Performed by: STUDENT IN AN ORGANIZED HEALTH CARE EDUCATION/TRAINING PROGRAM

## 2024-03-19 PROCEDURE — 120N000001 HC R&B MED SURG/OB

## 2024-03-19 PROCEDURE — 87206 SMEAR FLUORESCENT/ACID STAI: CPT | Performed by: INTERNAL MEDICINE

## 2024-03-19 PROCEDURE — 250N000013 HC RX MED GY IP 250 OP 250 PS 637: Performed by: STUDENT IN AN ORGANIZED HEALTH CARE EDUCATION/TRAINING PROGRAM

## 2024-03-19 PROCEDURE — 87075 CULTR BACTERIA EXCEPT BLOOD: CPT | Performed by: RADIOLOGY

## 2024-03-19 RX ORDER — NALOXONE HYDROCHLORIDE 0.4 MG/ML
0.4 INJECTION, SOLUTION INTRAMUSCULAR; INTRAVENOUS; SUBCUTANEOUS
Status: DISCONTINUED | OUTPATIENT
Start: 2024-03-19 | End: 2024-03-23 | Stop reason: HOSPADM

## 2024-03-19 RX ORDER — FENTANYL CITRATE 50 UG/ML
25-50 INJECTION, SOLUTION INTRAMUSCULAR; INTRAVENOUS EVERY 5 MIN PRN
Status: DISCONTINUED | OUTPATIENT
Start: 2024-03-19 | End: 2024-03-20

## 2024-03-19 RX ORDER — LIDOCAINE 40 MG/G
CREAM TOPICAL
Status: DISCONTINUED | OUTPATIENT
Start: 2024-03-19 | End: 2024-03-23 | Stop reason: HOSPADM

## 2024-03-19 RX ORDER — NALOXONE HYDROCHLORIDE 0.4 MG/ML
0.2 INJECTION, SOLUTION INTRAMUSCULAR; INTRAVENOUS; SUBCUTANEOUS
Status: DISCONTINUED | OUTPATIENT
Start: 2024-03-19 | End: 2024-03-23 | Stop reason: HOSPADM

## 2024-03-19 RX ORDER — FLUMAZENIL 0.1 MG/ML
0.2 INJECTION, SOLUTION INTRAVENOUS
Status: DISCONTINUED | OUTPATIENT
Start: 2024-03-19 | End: 2024-03-23 | Stop reason: HOSPADM

## 2024-03-19 RX ADMIN — AMPICILLIN SODIUM AND SULBACTAM SODIUM 3 G: 2; 1 INJECTION, POWDER, FOR SOLUTION INTRAMUSCULAR; INTRAVENOUS at 04:12

## 2024-03-19 RX ADMIN — MIDAZOLAM 1 MG: 1 INJECTION INTRAMUSCULAR; INTRAVENOUS at 12:47

## 2024-03-19 RX ADMIN — ACETAMINOPHEN 650 MG: 325 TABLET ORAL at 05:48

## 2024-03-19 RX ADMIN — AMPICILLIN SODIUM AND SULBACTAM SODIUM 3 G: 2; 1 INJECTION, POWDER, FOR SOLUTION INTRAMUSCULAR; INTRAVENOUS at 10:23

## 2024-03-19 RX ADMIN — ACETAMINOPHEN 650 MG: 325 TABLET ORAL at 01:37

## 2024-03-19 RX ADMIN — FENTANYL CITRATE 50 MCG: 50 INJECTION INTRAMUSCULAR; INTRAVENOUS at 12:54

## 2024-03-19 RX ADMIN — AMPICILLIN SODIUM AND SULBACTAM SODIUM 3 G: 2; 1 INJECTION, POWDER, FOR SOLUTION INTRAMUSCULAR; INTRAVENOUS at 22:11

## 2024-03-19 RX ADMIN — MIDAZOLAM 1 MG: 1 INJECTION INTRAMUSCULAR; INTRAVENOUS at 12:57

## 2024-03-19 RX ADMIN — HYDROMORPHONE HYDROCHLORIDE 1 MG: 1 INJECTION, SOLUTION INTRAMUSCULAR; INTRAVENOUS; SUBCUTANEOUS at 18:13

## 2024-03-19 RX ADMIN — OXYCODONE HYDROCHLORIDE 5 MG: 5 TABLET ORAL at 01:37

## 2024-03-19 RX ADMIN — FENTANYL CITRATE 50 MCG: 50 INJECTION INTRAMUSCULAR; INTRAVENOUS at 12:49

## 2024-03-19 RX ADMIN — AMPICILLIN SODIUM AND SULBACTAM SODIUM 3 G: 2; 1 INJECTION, POWDER, FOR SOLUTION INTRAMUSCULAR; INTRAVENOUS at 17:34

## 2024-03-19 RX ADMIN — SODIUM CHLORIDE: 9 INJECTION, SOLUTION INTRAVENOUS at 10:27

## 2024-03-19 RX ADMIN — FENTANYL CITRATE 50 MCG: 50 INJECTION INTRAMUSCULAR; INTRAVENOUS at 13:00

## 2024-03-19 RX ADMIN — OXYCODONE HYDROCHLORIDE 5 MG: 5 TABLET ORAL at 05:48

## 2024-03-19 RX ADMIN — MIDAZOLAM 1 MG: 1 INJECTION INTRAMUSCULAR; INTRAVENOUS at 12:52

## 2024-03-19 ASSESSMENT — ACTIVITIES OF DAILY LIVING (ADL)
ADLS_ACUITY_SCORE: 22
ADLS_ACUITY_SCORE: 20
ADLS_ACUITY_SCORE: 22
ADLS_ACUITY_SCORE: 20
ADLS_ACUITY_SCORE: 22
ADLS_ACUITY_SCORE: 20
ADLS_ACUITY_SCORE: 22
ADLS_ACUITY_SCORE: 20

## 2024-03-19 ASSESSMENT — COLUMBIA-SUICIDE SEVERITY RATING SCALE - C-SSRS
6. HAVE YOU EVER DONE ANYTHING, STARTED TO DO ANYTHING, OR PREPARED TO DO ANYTHING TO END YOUR LIFE?: NO
1. IN THE PAST MONTH, HAVE YOU WISHED YOU WERE DEAD OR WISHED YOU COULD GO TO SLEEP AND NOT WAKE UP?: NO
2. HAVE YOU ACTUALLY HAD ANY THOUGHTS OF KILLING YOURSELF IN THE PAST MONTH?: NO

## 2024-03-19 NOTE — PLAN OF CARE
Problem: Adult Inpatient Plan of Care  Goal: Absence of Hospital-Acquired Illness or Injury  Intervention: Identify and Manage Fall Risk  Recent Flowsheet Documentation  Taken 3/19/2024 4839 by Farshad Pickett, RN  Safety Promotion/Fall Prevention:   lighting adjusted   nonskid shoes/slippers when out of bed   safety round/check completed     Problem: Adult Inpatient Plan of Care  Goal: Optimal Comfort and Wellbeing  Outcome: Progressing     Problem: Adult Inpatient Plan of Care  Goal: Readiness for Transition of Care  Outcome: Progressing    SUBJECTIVE:  Pt a/o with VSS. Had a new SURENDRA drain (RMQ) placed today in addition to the current one he has on LLQ. Daily dressing changes or PRN.      Goal Outcome Evaluation:      Plan of Care Reviewed With: patient, family    Overall Patient Progress: improving    Farshad Killian RN

## 2024-03-19 NOTE — CONSULTS
"[facility]  General ID Service Consult      Patient: Mynor Rollins  YOB: 2000, MRN: [id number]  Date of Admission:  3/18/2024  Date of Consult: 03/19/2024  Consult Requested by: Luna Pacheco MD  Admission Diagnosis: Diverticulitis of large intestine with abscess without bleeding  Consult Question:     abdominal abscess       ID Assessment & Plan   Suspected IBD, but no prior syx,  with perforated colitis and abscess  S/p IR drain  Micro: Enterococcus, and E. Coli, Clostridium perfringens  Diabetes on ozempic  Obesity  No hx of Tb or exposure    PLAN  Continue UNASYN  Likely neds a course IV given weight  Stool calprotectin  QFG and AFB stain    Cici Castillo MD  [facility]  ______________________________________________________________________        History of Present Illness   Per hospitalist  23 year old male admitted on 3/18/2024.. He has past medical history of obesity and diabetes who was initially admitted to [facility] on 3/8/2024 with left lower quadrant pain.  Workup with CT abdomen on admission showed 3.4 cm possible abscess favoring diverticular abscess..  Patient was febrile with leukocytosis and was started on cefepime and Flagyl.  Patient had a repeat abdomen on 3/12 which showed enlarging abscess and underwent drain placement by IR on 3/13.  Patient had another CT scan on 3/14 which showed interval decrease in the size of the left lower quadrant abscess.  However a new pelvic abscess was seen.  Gm stain and cx of abscess growing  Enterococcus faecalis, Enterococcus Sylwia's, E. coli and Clostridium perfringens and antibiotics changed to Unasyn.  Patient had follow-up CT abdomen on 3/18 which shows pelvic abscess continuing to increase in size.  Patient is being transferred to [facility] for further IR interventions.\"    Feels ok  No pain  Just had new drain, right-serous  Nad diarrhea , now mushy  No blood    Radiology " personally reviewed  CT  MPRESSION:   1.  13.4 x 9.3 x 5.8 cm collection of rim-enhancing fluid in the pelvis superior to the bladder and separate from the left pelvic percutaneous drainage catheter has increased in volume and now has a more loculated configuration concerning for abscess.  2.  Smaller complex collection near the proximal sigmoid colon has mildly decreased in size from 03/14/2024. The percutaneous drainage catheter terminates immediately anterior to the collection.  3.  Peritonitis appears to have worsened from previous.    Review of Systems   The 10 point Review of Systems is negative other than noted in the HPI or here.     Past Medical History    No past medical history on file.    Past Surgical History   Past Surgical History:   Procedure Laterality Date    NO HISTORY OF SURGERY         Social History   Social History     Tobacco Use    Smoking status: Never    Smokeless tobacco: Never   Substance Use Topics    Alcohol use: Yes     Comment: Occasionally    Drug use: Never       Family History   I have reviewed this patient's family history and updated it with pertinent information if needed.  Family History   Problem Relation Age of Onset    Diabetes Type 2  Father     Diabetes Paternal Grandmother        Medications   I have reviewed this patient's current medications    Allergies   No Known Allergies    Physical Exam   Vital Signs: Temp: 99  F (37.2  C) Temp src: Oral BP: 119/67 Pulse: 102   Resp: 20 SpO2: 98 % O2 Device: None (Room air)    Weight: 0 lbs 0 oz    Gen. appearance nontoxic  Eyes no conjunctivitis or icterus  Neck no stiffness or neck vein distention, no LN  Heart  No edema  Lungs breathing comfortably  Abdomen soft not tender  Right SURENDRA serous  Left SURENDRA purulent  Extremities no synovitis, trace edema  Skin  no rash or emboli  Neurologic alert oriented no focal deficits        Data   Inflammatory Markers   Recent Labs   Lab Test 03/15/24  0525 03/14/24  0508 03/12/24  0534  "03/11/24  0551   CRPI 204.16* 208.37* 105.98* 100.27*        Hematology Studies   Recent Labs   Lab Test 03/19/24  0717 03/18/24  0535 03/17/24  0730 03/15/24  0525 03/14/24  0508 03/12/24  0534 06/17/20  2315 06/17/20  1704   WBC 10.8 12.5* 11.5* 15.0* 14.5* 12.6*   < > 17.4*   ANEU  --   --   --   --   --   --   --  13.4*   AEOS  --   --   --   --   --   --   --  0.0   HGB 12.9* 12.1* 13.3 12.8* 13.8 13.0*   < > 18.2*   MCV 87 88 87 85 87 85   < > 87   * 463* 447 407 431 345   < > 436    < > = values in this interval not displayed.       Metabolic Studies   Recent Labs   Lab Test 03/19/24  0717 03/18/24  0535 03/17/24  0730 03/15/24  0525 03/14/24  0508    139 138 133* 132*   POTASSIUM 3.6 3.7 3.6 3.9 3.5   CHLORIDE 101 104 100 99 97*   CO2 25 26 25 22 22   BUN 4.2* 4.5* 4.6* 6.4 7.2   CR 0.58* 0.60* 0.61* 0.67 0.66*   GFRESTIMATED >90 >90 >90 >90 >90       Hepatic Studies    Recent Labs   Lab Test 03/09/24  0532 03/08/24  1716 06/17/20  2315   BILITOTAL 0.9 0.8 0.7   ALKPHOS 70 79 145   ALBUMIN 4.1 4.6 4.7   AST 21 33 24   ALT 59 81* 96*       Most Recent 6 Bacteria Isolates From Any Culture (See EPIC Reports for Culture Details):No lab results found.    Urine Studies    Recent Labs   Lab Test 06/17/20  2330   LEUKEST Negative   WBCU <1       Vancomycin Levels  No lab results found.    Invalid input(s): \"VANCO\"    Hepatitis B Testing No lab results found.  Hepatitis C Testing   No results found for: \"HCVAB\", \"HQTG\", \"HCGENO\", \"HCPCR\", \"HQTRNA\", \"HEPRNA\"  HIVTesting No lab results found.    Respiratory Virus Testing    No results found for: \"RS\", \"FLUAG\"  COVID-19 Antibody Results, Testing for Immunity           No data to display              COVID-19 PCR Results           No data to display                "

## 2024-03-19 NOTE — CONSULTS
General Surgery Consult Note     Mynor Rollins MRN# [id number]   YOB: 2000 Age: 23 year old      Date of Admission:  3/18/2024    Reason for Consult: Perforated diverticulitis    Assessment: Mynor Rollins is a(n) 23 year old year old male with history of obesity and poorly controlled diabetes, transferred from OSH with perforated diverticulitis.    Plan:  Ideally given age and comorbidities would be able to manage nonoperatively with consideration for colectomy in the future  He needs to be NPO until this actually resolved, would not advance diet.  Also given age would appreciate input as to if any IBD workup needed in the acute setting.    -------------------    HPI: Mynor Rollins is a(n) 23 year old year old male who was initially admitted at OSH 3/8/24 with abdominal pain, fevers, had been feeling well prior to this.  Underwent IR drainage of percutaneous abscess, however WBC trending up and repeat CT demonstrated a new abscess.  He's been feeling okay and he's actually been on a diet for some reason, having BMs.      Past Medical History:  Diabetes    Past Surgical History:  No intra-abdominal surgeries    Allergies:   No Known Allergies    Medications:  No current facility-administered medications on file prior to encounter.  blood glucose (NO BRAND SPECIFIED) lancets standard, Use to test blood sugar 4 times daily or as directed.  blood glucose (NO BRAND SPECIFIED) test strip, Use to test blood sugar 4 times daily or as directed.  blood glucose monitoring (NO BRAND SPECIFIED) meter device kit, Use to test blood sugar 4 times daily or as directed.  insulin pen needle (32G X 4 MM) 32G X 4 MM miscellaneous, Use 5 pen needles daily or as directed. (Patient taking differently: Use 1 pen needle weekly or as directed.)  OZEMPIC, 1 MG/DOSE, 4 MG/3ML pen, Inject 1 mg Subcutaneous every 7 days On Monday.  sodium chloride, PF, 0.9% PF flush, Irrigate with 10 mLs as directed daily        Social  History:  Social History     Socioeconomic History    Marital status: Single     Spouse name: Not on file    Number of children: Not on file    Years of education: Not on file    Highest education level: Not on file   Occupational History    Not on file   Tobacco Use    Smoking status: Never    Smokeless tobacco: Never   Substance and Sexual Activity    Alcohol use: Yes     Comment: Occasionally    Drug use: Never    Sexual activity: Not on file   Other Topics Concern    Not on file   Social History Narrative    Not on file     Social Determinants of Health     Financial Resource Strain: Not on file   Food Insecurity: Not on file   Transportation Needs: Not on file   Physical Activity: Not on file   Stress: Not on file   Social Connections: Not on file   Interpersonal Safety: Not on file   Housing Stability: Not on file       Family History:  Family History   Problem Relation Age of Onset    Diabetes Type 2  Father     Diabetes Paternal Grandmother      No hx IBD    ROS:  As noted above.     Exam:  /63   Pulse 101   Temp 99  F (37.2  C) (Oral)   Resp 30   SpO2 93%   General: Alert, interactive, & in NAD  Resp: CTAB, no crackles or wheezes  Cardiac: Regular rate; extremities warm;   Abdomen: Soft, nontender, nondistended. Drain with purulent output.  Extremities: No LE edema or obvious joint abnormalities  Skin: Warm and dry, no jaundice or rash    Labs:  All laboratory data reviewed    Imaging:   CT reviewed    --    Hari Goldsmith MD      1:20 PM, 3/19/2024

## 2024-03-19 NOTE — PROGRESS NOTES
"PRIMARY DIAGNOSIS: \"GENERIC\" NURSING  OUTPATIENT/OBSERVATION GOALS TO BE MET BEFORE DISCHARGE:  ADLs back to baseline: Yes    Activity and level of assistance: Ambulating independently.    Pain status: Pain free.    Return to near baseline physical activity: Yes     Discharge Planner Nurse   Safe discharge environment identified: No  Barriers to discharge: Yes       Entered by: Ginny Hester RN 03/18/2024 8:25 PM     Please review provider order for any additional goals.   Nurse to notify provider when observation goals have been met and patient is ready for discharge.  "

## 2024-03-19 NOTE — IR NOTE
Patient Name: Mynor Rollins  Medical Record Number: [id number]  Today's Date: 3/19/2024    Procedure: drain placement  Proceduralist: dr Hi    Procedure Start: 1245  Procedure end: 1317  Sedation medications administered: 3 mg midazolam and 150 mcg fentanyl   Sedation time: 32 minutes

## 2024-03-19 NOTE — PROGRESS NOTES
[facility]    Medicine Progress Note - Hospitalist Service    Date of Admission:  3/18/2024    Assessment & Plan      Mynor Rollins is a 23 year old male admitted on 3/18/2024.. He has past medical history of obesity and diabetes who was initially admitted to [facility] on 3/8/2024 with left lower quadrant pain.  Workup with CT abdomen on admission showed 3.4 cm possible abscess favoring diverticular abscess..  Patient was febrile with leukocytosis and was started on cefepime and Flagyl.  Patient had a repeat abdomen on 3/12 which showed enlarging abscess and underwent drain placement by IR on 3/13.  Patient had another CT scan on 3/14 which showed interval decrease in the size of the left lower quadrant abscess.  However a new pelvic abscess was seen.  Gm stain and cx of abscess growing  Enterococcus faecalis, Enterococcus Sylwia's, E. coli and Clostridium perfringens and antibiotics changed to Unasyn.  Patient had follow-up CT abdomen on 3/18 which shows pelvic abscess continuing to increase in size and therefore patient was transferred to [facility] for further IR interventions.    Sepsis secondary to diverticular abscess  Pelvic abscess  -Patient presented with left lower quadrant pain along with other GI symptoms.  Workup revealed patient had sepsis with diverticular abscess seen on the CT scan.  Patient underwent a drain placement on 3/13.  Abscess culture growing Clostridium perfringens and Enterococcus.  -Follow-up CT scan shows decreasing in the size of diverticular abscess but developing of new pelvic abscess which is continued to increase in size.  -Gram stain and culture from abscess growing Enterococcus faecalis, Enterococcus Mcbride, E. coli and Clostridium perfringens  -Continue IV Unasyn every 6 hours  -Consulted IR for further intervention - anticipate today at noon   -Consult general surgery and ID  -Keep n.p.o., IVF, pain meds and antiemetics as needed  -Monitor labs  "CBC, BMP, CRP      Diabetes mellitus with hyperglycemia  -Poorly controlled with A1c of 9  -Home regimen includes Ozempic, hold for now  -sliding scale insulin, Monitor BS  -On hypoglycemia protocol            Diet: NPO per Anesthesia Guidelines for Procedure/Surgery Except for: Meds    DVT Prophylaxis: Ambulate every shift  Damico Catheter: Not present  Lines: None     Cardiac Monitoring: None  Code Status: Full Code      Clinically Significant Risk Factors Present on Admission                      # DMII: A1C = 9.0 % (Ref range: <5.7 %) within past 6 months    # Severe Obesity: Estimated body mass index is 48 kg/m  as calculated from the following:    Height as of 3/8/24: 1.702 m (5' 7\").    Weight as of 3/8/24: 139 kg (306 lb 7 oz).              Disposition Plan      Expected Discharge Date: 03/21/2024    Discharge Delays: IV Medication - consider oral or Home Infusion  Procedure Pending (enter procedure & time in comments)                Luna Pahceco MD  Hospitalist Service  [facility]  Securely message with [employer] (more info)  Text page via [employer] Paging/Directory   ______________________________________________________________________    Interval History   Patient reports only mild left lower quadrant discomfort  No nausea  Last BM was this morning  Ambulates without difficulties    Physical Exam   Vital Signs: Temp: 99  F (37.2  C) Temp src: Oral BP: 118/63 Pulse: 101   Resp: 30 SpO2: 93 % O2 Device: None (Room air)    Weight: 0 lbs 0 oz    General Appearance: Obese male in no acute distress  Respiratory: Respirations unlabored, lungs are clear  Cardiovascular: Regular rate and rhythm.  Normal S1-S2  GI: Abdomen obese, soft, slightly distended, mild left lower quadrant tenderness, SURENDRA drain in the left lower quadrant draining purulent fluid      Medical Decision Making       45 MINUTES SPENT BY ME on the date of service doing chart review, history, exam, documentation & further " activities per the note.  MANAGEMENT DISCUSSED with the following over the past 24 hours: Patient and nursing staff       Data     I have personally reviewed the following data over the past 24 hrs:    10.8  \   12.9 (L)   / 492 (H)     139 101 4.2 (L) /  100 (H)   3.6 25 0.58 (L) \       Imaging results reviewed over the past 24 hrs:   No results found for this or any previous visit (from the past 24 hour(s)).

## 2024-03-19 NOTE — PHARMACY-ADMISSION MEDICATION HISTORY
Admission medication history completed at [facility]. Please see Medication Scribe Admission Medication History note from 03/09/2024.

## 2024-03-19 NOTE — CONSULTS
CONSULTING PHYSICIAN   Luna Pacheco MD     REASON FOR CONSULTATION   Abdominal abscess     CHIEF COMPLAINT   Mynor Rollins came to the hospital for evaluation of abdominal pain     HISTORY OF PRESENT ILLNESS   Mynor Rollins is a 23 year old male with DM and obesity admitted with intra-abdominal abscess.    Patient notes that he had sudden onset of left sided abd pain on 03/06. This pain progressed and he went to [facility] on 03/08 where an abscess noted on sigmoid/descending colon. This was a suspected diverticular abscess. Despites antibiotics, his abscess enlarged and he had a drain placed 03/13. Imaging on 03/14 showed decrease in abscess but a new pelvic abscess was present.   Cultures showed enterococcus faecalis, E Coli and C Perfringens.   Imaging showed progression of pelvic abscess and transferred to [facility]. Second drain placed today    Patient denies any symptoms leading up to his onset of pain. He had diarrhea on 03/06 for 2 days but not subsequently and no symptoms prior to 03/06.     Denies any FH of Crohns/ UC/malignancies/diverticular disease    Enjoys grilling and has a wire cleaning brush but has not grilled in over 2 weeks.     Currently he notes significant improvement over the past few days.      PAST HISTORY   No past medical history on file.   Past Surgical History:   Procedure Laterality Date    NO HISTORY OF SURGERY          Family History Social History   Family History   Problem Relation Age of Onset    Diabetes Type 2  Father     Diabetes Paternal Grandmother     Social History     Tobacco Use    Smoking status: Never    Smokeless tobacco: Never   Substance Use Topics    Alcohol use: Yes     Comment: Occasionally    Drug use: Never          MEDICATIONS & ALLERGIES   Medications Prior to Admission   Medication Sig Dispense Refill Last Dose    blood glucose (NO BRAND SPECIFIED) lancets standard Use to test blood sugar 4 times daily or as  directed. 200 each 0     blood glucose (NO BRAND SPECIFIED) test strip Use to test blood sugar 4 times daily or as directed. 200 strip 1     blood glucose monitoring (NO BRAND SPECIFIED) meter device kit Use to test blood sugar 4 times daily or as directed. 1 kit 0     insulin pen needle (32G X 4 MM) 32G X 4 MM miscellaneous Use 5 pen needles daily or as directed. (Patient taking differently: Use 1 pen needle weekly or as directed.) 200 each 1     OZEMPIC, 1 MG/DOSE, 4 MG/3ML pen Inject 1 mg Subcutaneous every 7 days On Monday.       sodium chloride, PF, 0.9% PF flush Irrigate with 10 mLs as directed daily 150 mL 3         ALLERGIES   No Known Allergies      REVIEW OF SYSTEMS   A comprehensive review of systems was performed and was otherwise noncontributory.     OBJECTIVE   Vitals Blood pressure (!) 140/99, pulse 107, temperature 98.5  F (36.9  C), temperature source Oral, resp. rate 19, SpO2 96%.           Physical  Exam  GENERAL: alert and oriented, well nourished in no apparent distress   SKIN: warm and dry, no rashes   HEENT: atraumatic, anicteric, moist mucous membranes, neck soft/supple    PULMONARY: normal resp effort, breath sounds clear to auscultation bilateral   CARDIOVASCULAR: normal rate and rhythm, no murmurs, no edema   ABDOMEN: +TTP. Two drains present with purulent drainage    MUSCULOSKELETAL: joints and gait normal   NEUROLOGICAL: appropriate mental status, grossly intact  DERM: no rash, no jaundice   PSYCHIATRIC: normal mood, affect and insight        LABORATORY    ELECTROLYTE PANEL   Recent Labs   Lab 03/19/24  1152 03/19/24  0823 03/19/24  0717 03/18/24  0727 03/18/24  0535 03/17/24  1057 03/17/24  0730   NA  --   --  139  --  139  --  138   POTASSIUM  --   --  3.6  --  3.7  --  3.6   CHLORIDE  --   --  101  --  104  --  100   CO2  --   --  25  --  26  --  25   * 112* 124*   < > 145*   < > 109*  116*   CR  --   --  0.58*  --  0.60*  --  0.61*   BUN  --   --  4.2*  --  4.5*  --  4.6*     "< > = values in this interval not displayed.      HEMATOLOGY PANEL   Recent Labs   Lab 03/19/24  0717 03/18/24  0535 03/17/24  0730 03/14/24  0508 03/13/24  1020   HGB 12.9* 12.1* 13.3   < >  --    MCV 87 88 87   < >  --    WBC 10.8 12.5* 11.5*   < >  --    * 463* 447   < >  --    INR  --   --   --   --  1.20*    < > = values in this interval not displayed.      LIVER AND PANCREAS PANEL   No results for input(s): \"AST\", \"ALT\", \"ALKPHOS\", \"BILITOTAL\", \"LIPASE\" in the last 168 hours.  IMAGING STUDIES        I have reviewed the current diagnostic and laboratory tests.           IMPRESSION   Mynor Rollins is a 23 year old male with new intrabdominal abscess, suspected diverticular origin.  Agree with diverticular abscess and current management. The differential includes Crohns, colon malignancy or luminal perforation from swallowed foreign object though all less likely.     Cannot perform colonoscopy as insufflation would worsen abscess. Recommend this procedure in 2-3 months to rule out alternate etiology     Will arrange follow up    GI will sign off, please call if questions or concerns              Luis Red MD  Thank you for the opportunity to participate in the care of this patient.   Please feel free to call me with any questions or concerns.  Phone number [phone].          "

## 2024-03-19 NOTE — PROGRESS NOTES
"PRIMARY DIAGNOSIS: \"GENERIC\" NURSING  OUTPATIENT/OBSERVATION GOALS TO BE MET BEFORE DISCHARGE:  ADLs back to baseline: Yes    Activity and level of assistance: Ambulating independently.    Pain status: Pain free.    Return to near baseline physical activity: Yes     Discharge Planner Nurse   Safe discharge environment identified: No  Barriers to discharge: Yes       Entered by: Ginny Hester RN 03/18/2024 8:20 PM     Please review provider order for any additional goals.   Nurse to notify provider when observation goals have been met and patient is ready for discharge.  "

## 2024-03-19 NOTE — PRE-PROCEDURE
GENERAL PRE-PROCEDURE:     Written consent obtained?: Yes    Risks and benefits: Risks, benefits and alternatives were discussed    Consent given by:  Patient  Patient states understanding of procedure being performed: Yes    Patient's understanding of procedure matches consent: Yes    Procedure consent matches procedure scheduled: Yes    Expected level of sedation:  Moderate  Appropriately NPO:  Yes  ASA Class:  1  Mallampati  :  Grade 2- soft palate, base of uvula, tonsillar pillars, and portion of posterior pharyngeal wall visible  Lungs:  Lungs clear with good breath sounds bilaterally  Heart:  Normal heart sounds and rate  History & Physical reviewed:  History and physical reviewed and no updates needed  Statement of review:  I have reviewed the lab findings, diagnostic data, medications, and the plan for sedation

## 2024-03-19 NOTE — PLAN OF CARE
Goal Outcome Evaluation:                        Problem: Adult Inpatient Plan of Care  Goal: Optimal Comfort and Wellbeing  Outcome: Progressing  Intervention: Monitor Pain and Promote Comfort  Recent Flowsheet Documentation  Taken 3/19/2024 0137 by Amy Rosales RN  Pain Management Interventions: medication (see MAR)     Problem: Pain Acute  Goal: Optimal Pain Control and Function  Outcome: Progressing  Intervention: Develop Pain Management Plan  Recent Flowsheet Documentation  Taken 3/19/2024 0137 by Amy Rosales RN  Pain Management Interventions: medication (see MAR)  Intervention: Prevent or Manage Pain  Recent Flowsheet Documentation  Taken 3/19/2024 0135 by Amy Rosales RN  Medication Review/Management: medications reviewed  Intervention: Optimize Psychosocial Wellbeing  Recent Flowsheet Documentation  Taken 3/19/2024 0135 by Amy Rosales RN  Diversional Activities: television     Problem: Comorbidity Management  Goal: Blood Glucose Levels Within Targeted Range  Outcome: Progressing  Intervention: Monitor and Manage Glycemia  Recent Flowsheet Documentation  Taken 3/19/2024 0135 by Amy Rosales RN  Medication Review/Management: medications reviewed     Continuous IVF NS 75 ml/hr. Left lower abdominal pain managed with PRN Tylenol & oxycodone. SURENDRA drain irrigated per orders. Scheduled IV Unasyn administered. Independent in room.

## 2024-03-20 LAB
BACTERIA ABSC ANAEROBE+AEROBE CULT: ABNORMAL
ERYTHROCYTE [DISTWIDTH] IN BLOOD BY AUTOMATED COUNT: 11.7 % (ref 10–15)
GLUCOSE BLDC GLUCOMTR-MCNC: 110 MG/DL (ref 70–99)
GLUCOSE BLDC GLUCOMTR-MCNC: 86 MG/DL (ref 70–99)
GLUCOSE BLDC GLUCOMTR-MCNC: 87 MG/DL (ref 70–99)
GLUCOSE BLDC GLUCOMTR-MCNC: 89 MG/DL (ref 70–99)
GLUCOSE BLDC GLUCOMTR-MCNC: 89 MG/DL (ref 70–99)
HCT VFR BLD AUTO: 39.6 % (ref 40–53)
HGB BLD-MCNC: 12.4 G/DL (ref 13.3–17.7)
HOLD SPECIMEN: NORMAL
HOLD SPECIMEN: NORMAL
MCH RBC QN AUTO: 27.4 PG (ref 26.5–33)
MCHC RBC AUTO-ENTMCNC: 31.3 G/DL (ref 31.5–36.5)
MCV RBC AUTO: 88 FL (ref 78–100)
PLATELET # BLD AUTO: 519 10E3/UL (ref 150–450)
RBC # BLD AUTO: 4.52 10E6/UL (ref 4.4–5.9)
WBC # BLD AUTO: 11 10E3/UL (ref 4–11)

## 2024-03-20 PROCEDURE — 86481 TB AG RESPONSE T-CELL SUSP: CPT | Performed by: INTERNAL MEDICINE

## 2024-03-20 PROCEDURE — 99232 SBSQ HOSP IP/OBS MODERATE 35: CPT | Performed by: INTERNAL MEDICINE

## 2024-03-20 PROCEDURE — 36415 COLL VENOUS BLD VENIPUNCTURE: CPT | Performed by: INTERNAL MEDICINE

## 2024-03-20 PROCEDURE — 85027 COMPLETE CBC AUTOMATED: CPT | Performed by: INTERNAL MEDICINE

## 2024-03-20 PROCEDURE — 250N000011 HC RX IP 250 OP 636: Performed by: STUDENT IN AN ORGANIZED HEALTH CARE EDUCATION/TRAINING PROGRAM

## 2024-03-20 PROCEDURE — 258N000003 HC RX IP 258 OP 636: Performed by: INTERNAL MEDICINE

## 2024-03-20 PROCEDURE — 99231 SBSQ HOSP IP/OBS SF/LOW 25: CPT | Performed by: SURGERY

## 2024-03-20 PROCEDURE — 120N000001 HC R&B MED SURG/OB

## 2024-03-20 PROCEDURE — 250N000013 HC RX MED GY IP 250 OP 250 PS 637: Performed by: STUDENT IN AN ORGANIZED HEALTH CARE EDUCATION/TRAINING PROGRAM

## 2024-03-20 RX ADMIN — ACETAMINOPHEN 650 MG: 325 TABLET ORAL at 16:08

## 2024-03-20 RX ADMIN — SODIUM CHLORIDE: 9 INJECTION, SOLUTION INTRAVENOUS at 13:46

## 2024-03-20 RX ADMIN — OXYCODONE HYDROCHLORIDE 5 MG: 5 TABLET ORAL at 20:35

## 2024-03-20 RX ADMIN — ACETAMINOPHEN 650 MG: 325 TABLET ORAL at 20:36

## 2024-03-20 RX ADMIN — HYDROMORPHONE HYDROCHLORIDE 0.5 MG: 1 INJECTION, SOLUTION INTRAMUSCULAR; INTRAVENOUS; SUBCUTANEOUS at 00:05

## 2024-03-20 RX ADMIN — AMPICILLIN SODIUM AND SULBACTAM SODIUM 3 G: 2; 1 INJECTION, POWDER, FOR SOLUTION INTRAMUSCULAR; INTRAVENOUS at 23:20

## 2024-03-20 RX ADMIN — AMPICILLIN SODIUM AND SULBACTAM SODIUM 3 G: 2; 1 INJECTION, POWDER, FOR SOLUTION INTRAMUSCULAR; INTRAVENOUS at 11:15

## 2024-03-20 RX ADMIN — AMPICILLIN SODIUM AND SULBACTAM SODIUM 3 G: 2; 1 INJECTION, POWDER, FOR SOLUTION INTRAMUSCULAR; INTRAVENOUS at 16:10

## 2024-03-20 RX ADMIN — HYDROMORPHONE HYDROCHLORIDE 1 MG: 1 INJECTION, SOLUTION INTRAMUSCULAR; INTRAVENOUS; SUBCUTANEOUS at 03:43

## 2024-03-20 RX ADMIN — AMPICILLIN SODIUM AND SULBACTAM SODIUM 3 G: 2; 1 INJECTION, POWDER, FOR SOLUTION INTRAMUSCULAR; INTRAVENOUS at 04:26

## 2024-03-20 ASSESSMENT — ACTIVITIES OF DAILY LIVING (ADL)
ADLS_ACUITY_SCORE: 22
DEPENDENT_IADLS:: INDEPENDENT
ADLS_ACUITY_SCORE: 22

## 2024-03-20 NOTE — PROGRESS NOTES
[facility]    Medicine Progress Note - Hospitalist Service    Date of Admission:  3/18/2024    Assessment & Plan   Mynor Rollins is a 23 year old male admitted on 3/18/2024.. He has past medical history of obesity and diabetes who was initially admitted to [facility] on 3/8/2024 with left lower quadrant pain.  Workup with CT abdomen on admission showed 3.4 cm possible abscess favoring diverticular abscess..  Patient was febrile with leukocytosis and was started on cefepime and Flagyl.  Patient had a repeat abdomen on 3/12 which showed enlarging abscess and underwent drain placement by IR on 3/13.  Patient had another CT scan on 3/14 which showed interval decrease in the size of the left lower quadrant abscess.  However a new pelvic abscess was seen.  Gm stain and cx of abscess growing  Enterococcus faecalis, Enterococcus Warren's, E. coli and Clostridium perfringens and antibiotics changed to Unasyn.  Patient had follow-up CT abdomen on 3/18 which shows pelvic abscess continuing to increase in size and therefore patient was transferred to [facility] where he underwent perc drainage of the new pelvic fluid collection     Perforated diverticulitis with Diverticular abscess  -Patient presented with left lower quadrant pain along with other GI symptoms.  Workup revealed patient had sepsis with diverticular abscess seen on the CT scan.  Patient underwent a drain placement on 3/13.  Abscess culture growing Enterococcus faecalis, Enterococcus Mcbride, E. coli and Clostridium perfringens  -Follow-up CT scan shows decreasing in the size of diverticular abscess but developing of new pelvic abscess which is continued to increase in size.  S/p percutaneous drainage by IR 3/19, cultures pending    -Gram stain and culture from abscess growing -Continue IV Unasyn every 6 hours  -General surgery and ID following, appreciate   -Keep n.p.o., IV Unasyn, IVF, pain meds and antiemetics as needed  -Monitor  "labs CBC, BMP, CRP      Diabetes mellitus type 2 - BG at goal in the hospital so far   -Poorly controlled as outpatient with A1c of 9  -Home regimen includes Ozempic, hold for now  -sliding scale insulin, Monitor BS  -On hypoglycemia protocol    Obesity Est BMI 48             Diet: NPO for Medical/Clinical Reasons Except for: Meds, Ice Chips    DVT Prophylaxis: Ambulate every shift  Damico Catheter: Not present  Lines: None     Cardiac Monitoring: None  Code Status: Full Code      Clinically Significant Risk Factors                        # DMII: A1C = 9.0 % (Ref range: <5.7 %) within past 6 months, PRESENT ON ADMISSION  # Severe Obesity: Estimated body mass index is 48 kg/m  as calculated from the following:    Height as of 3/8/24: 1.702 m (5' 7\").    Weight as of 3/8/24: 139 kg (306 lb 7 oz)., PRESENT ON ADMISSION     # Financial/Environmental Concerns: none         Disposition Plan      Expected Discharge Date: 03/23/2024    Discharge Delays: IV Medication - consider oral or Home Infusion  Procedure Pending (enter procedure & time in comments)                Luna Pacheco MD  Hospitalist Service  [facility]  Securely message with [employer] (more info)  Text page via [employer] Paging/Directory   ______________________________________________________________________    Interval History   Has more pain today - mostly mid abdomen   No nausea   No BM today     Physical Exam   Vital Signs: Temp: 98.4  F (36.9  C) Temp src: Oral BP: 135/84 Pulse: 91   Resp: 20 SpO2: 97 % O2 Device: None (Room air)    Weight: 0 lbs 0 oz    General Appearance: NAD  Respiratory: Respirations unlabored, lungs are clear bilaterally  Cardiovascular: Regular rate and rhythm.  Normal S1-S2  GI: Abdomen obese, soft, no significant tenderness, LLQ drain with small amount of purulent fluid, mid abdominal drain with serous fluid   Other: Awake, alert, nonfocal      Medical Decision Making       40 MINUTES SPENT BY ME on the " date of service doing chart review, history, exam, documentation & further activities per the note.  MANAGEMENT DISCUSSED with the following over the past 24 hours: patient, RN and SW       Data     I have personally reviewed the following data over the past 24 hrs:    11.0  \   12.4 (L)   / 519 (H)     N/A N/A N/A /  89   N/A N/A N/A \       Imaging results reviewed over the past 24 hrs:   No results found for this or any previous visit (from the past 24 hour(s)).

## 2024-03-20 NOTE — DISCHARGE INSTRUCTIONS
Drain Placement Discharge Instructions:  You had a small tube or drain(s) placed. This was placed so the abnormal fluid collection within your body can be drained out (externally). Sometimes drains must stay in place for weeks to months. Please follow the below instructions as you recover:    Care Instructions after drain placement:  - Rest after your drain was placed. Avoid strenuous activity and heavy lifting for the next 2 days. Return to your normal activities as you tolerate after the 2 day restriction.  - You may shower; however, you should not submerge site under water like in a tub bath, Jacuzzi or pool. Keep drain exit site covered with plastic wrap and tape when showering.  - Keep dressing clean and dry as long as drain is in place. If you have a gauze dressing, please change the dressing daily and as needed to keep site clean and dry.  - You may eat and drink as normal.  - You may have discomfort after the procedure near the drain exit site. You may take acetaminophen (Tylenol ) or ibuprofen (Motrin ) as needed for any discomfort.  - Protect the drain from being pulled out or dislodged.  - Inspect the tube often for kinks.  - If you have a gravity bag, keep the bag below the drain exit site to allow for free flow of drainage by gravity.  - Flush your drainage tube with 10mL sterile normal saline daily to each drain.  - Record your daily drain outputs and amount flushed on your drainage record. Bring your records to your next radiology appointment. Often drains will need to stay in place until the drainage output is less than about 15mL a day for 2-3 days in a row.  - Empty your drainage bag/bulb daily or when it is approximately half way fluid. Follow below instructions for emptying your bag/bulb:       - Clean hands well with soap and water.       - Place a measuring container near the outlet valve of the drainage bag/bulb.       - If you have a drainage BAG: Twist the blue valve at the bottom of the bag  "while holding the valve over your measuring cup and open container to empty. Re-twist the valve closed on the drainage bag once complete.       - If you have a drainage BULB: Open the bulb cap (at the top of the bulb). Empty the fluid into the measuring cup. Squeeze bulb and hold flat. While bulb is squeezed, close the cap.       - After draining fluid record your drainage output.        - Discard drainage into toilet once fluid drained.    Flushing Your Drainage Tube:   If you have a 3 way stop cock:     1. Collect flushing supplies: 10mL of sterile normal saline in syringe, alcohol pad.  2. Clean the flushing (center) port with alcohol and attach the flushing syringe by twisting into place (clockwise).  3. Turn the 3 way stopcock valve \"off\" to the drainage bag/bulb. (Valve should be pointed toward the bag/bulb)  4. Gently inject/flush the drain so fluid is moving towards your body through the drainage catheter.   5. Turn the stopcock valve back to its center position to allow for drainage to resume.   6. Remove flushing syringe from flushing port by untwisting the syringe (counter clockwise).  7. Squeeze bulb or accordion to reapply suction if you have one.  8. Record the output from your drain and flush amount on your drainage record.     If you have a Y flushing port / UreSil Flush Adapter:    1. Collect flushing supplies: 10mL of sterile normal saline in syringe, alcohol pad.  2. Clamp off the tubing to the drain by pinching the white clamp closed. Clean the drain port with an alcohol wipe.   3. Attach the saline syringe to the drain port.   4. Twist the syringe (clockwise) to tighten it to the port   5. Flush sterile normal saline from the syringe into the drain. The saline should flow toward your body not toward the drainage bag.   6. Untwist the syringe (counter clockwise) and remove it.   7. Unclamp the white clamp making sure the drainage is able to flow freely into the bag.   8. Squeeze bulb or accordion " to reapply suction if you have one.  9. Record the output from your drain and flush amount on your drainage record.    Follow-Up:   - Please contact [facility] Scheduling at [phone].    Please seek medical evaluation for:  - Nausea and/or vomiting   - Diffuse abdominal pain  - Fevers (greater than 101 F)  - Drainage tube falls out, is pulled back or felt to be out of position.     Call [facility] RN Line at [phone] with tube related questions or concerns:  - Unable to flush drainage tube.   - Leakage (or purulent drainage) around drainage tube site.   - Extreme pain at drainage tube site.   - Outputs suddenly stop or significantly reduces.   - Warmth, redness, swelling or tenderness around the drainage tube

## 2024-03-20 NOTE — PLAN OF CARE
Problem: Adult Inpatient Plan of Care  Goal: Plan of Care Review  Description: The Plan of Care Review/Shift note should be completed every shift.  The Outcome Evaluation is a brief statement about your assessment that the patient is improving, declining, or no change.  This information will be displayed automatically on your shift  note.  Outcome: Progressing     Problem: Adult Inpatient Plan of Care  Goal: Absence of Hospital-Acquired Illness or Injury  Intervention: Prevent Infection  Recent Flowsheet Documentation  Taken 3/20/2024 1000 by Logan Huddleston RN  Infection Prevention:   rest/sleep promoted   single patient room provided     Problem: Adult Inpatient Plan of Care  Goal: Optimal Comfort and Wellbeing  Outcome: Progressing     Problem: Comorbidity Management  Goal: Blood Glucose Levels Within Targeted Range  Outcome: Progressing  Intervention: Monitor and Manage Glycemia  Recent Flowsheet Documentation  Taken 3/20/2024 1000 by Logan Huddleston RN  Glycemic Management: blood glucose monitored  Medication Review/Management: medications reviewed     Problem: Infection  Goal: Absence of Infection Signs and Symptoms  Outcome: Progressing    Patient is alert and orientated able to make needs known, denies pain throughout the shift. Continues to be NPO. IVF infusing at 125 ml/hr. Dressings changed to bilateral drains on the abdomen. Left drain flushed with 10 ml of sterile saline, with a total output of 20 ml this shift. Drainage is yellow and milky. Right drain irrigated with 5 ml of sterile saline. Out put of 15 ml. Drainage was clear and yellow. Blood sugars checked every 4 hours. No insulin needed this shift.     Logan Huddleston RN

## 2024-03-20 NOTE — PLAN OF CARE
Problem: Pain Acute  Goal: Optimal Pain Control and Function  Intervention: Optimize Psychosocial Wellbeing  Recent Flowsheet Documentation  Taken 3/19/2024 1745 by Neo Mcclendon RN  Supportive Measures:   active listening utilized   decision-making supported     Problem: Comorbidity Management  Goal: Blood Glucose Levels Within Targeted Range  Outcome: Progressing     Problem: Infection  Goal: Absence of Infection Signs and Symptoms  Outcome: Progressing   Goal Outcome Evaluation:               Patient A and O times 4. Independent in room. Running 75 ml/hr NS. NPO  except for meds and ice chips. IV dilaudid given once for pain, proved effective, SURENDRA sites CDI. Left side fluid sent down to lab. Right side SURENDRA put out 70 ml clear yellow. At 22:00 right side leaking air. House officer and surgery updated. Told to monitor.

## 2024-03-20 NOTE — PROGRESS NOTES
General Surgery Progress Note  Mynor Rollins  [id number]    Subjective  No acute events overnight.  Afebrile.  Minimal abd pain.    Objective  Temp:  [97.9  F (36.6  C)-98.5  F (36.9  C)] 97.9  F (36.6  C)  Pulse:  [] 92  Resp:  [18-30] 20  BP: (113-152)/(59-99) 118/76  SpO2:  [91 %-99 %] 98 %    Physical Exam:  Gen: Awake, alert, NAD  CVS: RRR  Resp: NLB  Abd: Soft, nt/nd, old drain purulent, new drain serous  Extrem: WWP    WBC normal    Assessment & Plan  Mynor Rollins is a(n) 23 year old year old male with history of obesity and poorly controlled diabetes, transferred from H with perforated diverticulitis, IR drain 3/13, new fluid collection s/p IR drain 3/19.      New drain actually looks serous, will  have to see what cultures show  NPO for today given need for multiple drains  Will follow    Hari Goldsmith MD

## 2024-03-20 NOTE — CONSULTS
Care Management Initial Consult    General Information  Assessment completed with: Patient,    Type of CM/SW Visit: Initial Assessment    Primary Care Provider verified and updated as needed:   yes  Readmission within the last 30 days:        Reason for Consult: discharge planning- IV antibiotics at discharge.   Advance Care Planning: Advance Care Planning Reviewed: questions answered          Communication Assessment  Patient's communication style: spoken language (English or Bilingual)    Hearing Difficulty or Deaf: no   Wear Glasses or Blind: yes    Cognitive  Cognitive/Neuro/Behavioral: WDL                      Living Environment:   People in home: parent(s), sibling(s)     Current living Arrangements: house      Able to return to prior arrangements: yes       Family/Social Support:  Care provided by: self  Provides care for: no one  Marital Status: Single  Parent(s)          Description of Support System: Supportive, Involved    Support Assessment: Adequate family and caregiver support    Current Resources:   Patient receiving home care services: No     Community Resources: Financial/Insurance  Equipment currently used at home: none  Supplies currently used at home: Diabetic Supplies    Employment/Financial:  Employment Status: unemployed, student     Employment/ Comments: no  history  Financial Concerns: none   Referral to Financial Worker: No       Does the patient's insurance plan have a 3 day qualifying hospital stay waiver?  No    Lifestyle & Psychosocial Needs:  Social Determinants of Health     Food Insecurity: Not on file   Depression: Not on file   Housing Stability: Not on file   Tobacco Use: Low Risk  (6/17/2020)    Patient History     Smoking Tobacco Use: Never     Smokeless Tobacco Use: Never     Passive Exposure: Not on file   Financial Resource Strain: Not on file   Alcohol Use: Unknown (6/17/2020)    AUDIT-C     Frequency of Alcohol Consumption: 2-4 times a month     Average  Number of Drinks: Not on file     Frequency of Binge Drinking: Not on file   Transportation Needs: Not on file   Physical Activity: Not on file   Interpersonal Safety: Not on file   Stress: Not on file   Social Connections: Not on file       Functional Status:  Prior to admission patient needed assistance:   Dependent ADLs:: Independent  Dependent IADLs:: Independent       Mental Health Status:  Mental Health Status: No Current Concerns       Chemical Dependency Status:  Chemical Dependency Status: No Current Concerns             Values/Beliefs:  Spiritual, Cultural Beliefs, Religion Practices, Values that affect care:                 Additional Information:  SW met with pt, introduced self and role, pt lives with his parents and his older brother, he recently quit his job as a  and is 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 Pt provided with ACP documents.     MOLINA Domínguez

## 2024-03-20 NOTE — PROGRESS NOTES
[facility]  Infectious Disease   Progress Note     Date of Admission:  3/18/2024    Assessment & Plan   Suspected IBD, but no prior syx,  with perforated colitis/diverticulitis (in young male) and abscess  S/p IR drain  Micro: Enterococcus, and E. Coli, Clostridium perfringens  Diabetes on ozempic  Obesity  No hx of Tb or exposure     PLAN  Continue UNASYN  Likely neds a course IV given weight  Stool calprotectin  QFG and AFB stain  Po Augmentin once cleared for discharge by surgery and diet resumed  Colonoscopy outpatient     Cici Castillo M.D.    ______________________________________________________________________    Interval History   No complaints  No pain nausea sweats or rash    Physical Exam   Vital Signs: Temp: 98.4  F (36.9  C) Temp src: Oral BP: 135/84 Pulse: 91   Resp: 20 SpO2: 97 % O2 Device: None (Room air)    Weight: 0 lbs 0 oz  Gen. appearance nontoxic  Eyes no conjunctivitis or icterus  Neck no stiffness or neck vein distention, no LN  Heart  no S3 or murmurs  Lungs clear no wheeze  Abdomen soft not tender  Extremities no synovitis, trace edema  Skin  no rash or emboli  Neurologic alert oriented no focal deficits    Data   Results for orders placed or performed during the hospital encounter of 03/18/24 (from the past 24 hour(s))   Abscess Aerobic Bacterial Culture Routine Without Gram Stain    Specimen: Pelvis; Abscess   Result Value Ref Range    Culture No growth, less than 1 day    CT Abdomen Peritoneum Abscess Drain w Cath Place    Narrative    EXAM:  1. PERCUTANEOUS DRAIN PLACEMENT PELVIC FLUID COLLECTION  2. CT GUIDANCE  3. CONSCIOUS SEDATION  LOCATION: [facility]  DATE: 3/19/2024    INDICATION: Status post drainage left pelvic abscess. New fluid collection in pelvis.  TECHNIQUE: Dose reduction techniques were used.    PROCEDURE: Informed consent obtained. Site marked. Prior images reviewed. Required items made available. Patient identity  confirmed verbally and with arm band. Patient reevaluated immediately before administering sedation. Universal protocol was   followed. Time out performed. The site was prepped and draped in sterile fashion. 16 mL of 1% lidocaine was infused into the local soft tissues. Using standard technique and under direct CT guidance, a 10 [place]ese drainage catheter was inserted into the   fluid collection.      SPECIMEN: 90 mL of serous fluid was drained during the procedure. Specimens were sent to lab for cultures and Gram stain.    BLOOD LOSS: Minimal.    The patient tolerated the procedure well. No immediate complications.    SEDATION: Versed 3 mg. Fentanyl 150 mcg. The procedure was performed with administration intravenous conscious sedation with appropriate preoperative, intraoperative, and postoperative evaluation.    32 minutes of supervised face to face conscious sedation time was provided by a radiology nurse under my direct supervision.      Impression    IMPRESSION:  1.  Successful CT-guided drain placement into central pelvis.    Reference CPT Codes: 74560, 30   Glucose by meter   Result Value Ref Range    GLUCOSE BY METER POCT 97 70 - 99 mg/dL   Acid-Fast Bacilli Culture and Stain with AFB Stain    Specimen: Drain; Peritoneal Fluid    Narrative    The following orders were created for panel order Acid-Fast Bacilli Culture and Stain with AFB Stain.  Procedure                               Abnormality         Status                     ---------                               -----------         ------                     Acid-Fast Bacilli Cultur...[[id number]]                      In process                   Please view results for these tests on the individual orders.   Glucose by meter   Result Value Ref Range    GLUCOSE BY METER POCT 112 (H) 70 - 99 mg/dL   Quantiferon-TB Gold Plus    Specimen: Peripheral Blood    Narrative    The following orders were created for panel order Quantiferon-TB Gold Plus.  Procedure                                Abnormality         Status                     ---------                               -----------         ------                     Quantiferon TB Gold Plus...[[id number]]                      In process                 Quantiferon TB Gold Plus...[[id number]]                      In process                 Quantiferon TB Gold Plus...[[id number]]                      In process                 Quantiferon TB Gold Plus...[[id number]]                      In process                   Please view results for these tests on the individual orders.   CBC with platelets   Result Value Ref Range    WBC Count 11.0 4.0 - 11.0 10e3/uL    RBC Count 4.52 4.40 - 5.90 10e6/uL    Hemoglobin 12.4 (L) 13.3 - 17.7 g/dL    Hematocrit 39.6 (L) 40.0 - 53.0 %    MCV 88 78 - 100 fL    MCH 27.4 26.5 - 33.0 pg    MCHC 31.3 (L) 31.5 - 36.5 g/dL    RDW 11.7 10.0 - 15.0 %    Platelet Count 519 (H) 150 - 450 10e3/uL   Extra Tube ([place] Draw)    Narrative    The following orders were created for panel order Extra Tube ([place] Draw).  Procedure                               Abnormality         Status                     ---------                               -----------         ------                     Extra Blue Top Tube[[id number]]                                                         Extra Red Top Tube[[id number]]                               Final result               Extra Green Top (Lithium...[[id number]]                                                 Extra Purple Top Tube[[id number]]                                                         Please view results for these tests on the individual orders.   Extra Red Top Tube   Result Value Ref Range    Hold Specimen JIC    Extra Tube    Narrative    The following orders were created for panel order Extra Tube.  Procedure                               Abnormality         Status                     ---------                               -----------         ------                      Extra Red Top Tube[[id number]]                               Final result                 Please view results for these tests on the individual orders.   Extra Red Top Tube   Result Value Ref Range    Hold Specimen JIC    Extra Tube ([place] Draw) *Canceled*    Narrative    The following orders were created for panel order Extra Tube ([place] Draw).  Procedure                               Abnormality         Status                     ---------                               -----------         ------                     Extra Blue Top Tube[[id number]]                                                         Extra Red Top Tube[[id number]]                                                            Please view results for these tests on the individual orders.   Extra Tube ([place] Draw) *Canceled*    Narrative    The following orders were created for panel order Extra Tube ([place] Draw).  Procedure                               Abnormality         Status                     ---------                               -----------         ------                     Extra Blue Top Tube[[id number]]                                                         Extra Red Top Tube[[id number]]                                                          Extra Purple Top Tube[[id number]]                                                         Please view results for these tests on the individual orders.   Glucose by meter   Result Value Ref Range    GLUCOSE BY METER POCT 87 70 - 99 mg/dL   Glucose by meter   Result Value Ref Range    GLUCOSE BY METER POCT 89 70 - 99 mg/dL

## 2024-03-20 NOTE — PLAN OF CARE
"  Problem: Adult Inpatient Plan of Care  Goal: Plan of Care Review  Description: The Plan of Care Review/Shift note should be completed every shift.  The Outcome Evaluation is a brief statement about your assessment that the patient is improving, declining, or no change.  This information will be displayed automatically on your shift  note.  Outcome: Progressing  Flowsheets (Taken 3/20/2024 0631)  Plan of Care Reviewed With: patient  Goal: Patient-Specific Goal (Individualized)  Description: You can add care plan individualizations to a care plan. Examples of Individualization might be:  \"Parent requests to be called daily at 9am for status\", \"I have a hard time hearing out of my right ear\", or \"Do not touch me to wake me up as it startles  me\".  Outcome: Progressing  Goal: Absence of Hospital-Acquired Illness or Injury  Outcome: Progressing  Goal: Optimal Comfort and Wellbeing  Outcome: Progressing  Intervention: Monitor Pain and Promote Comfort  Recent Flowsheet Documentation  Taken 3/20/2024 0400 by Neo Hassan RN  Pain Management Interventions: medication (see MAR)  Taken 3/20/2024 0000 by Neo Hassan RN  Pain Management Interventions: medication (see MAR)  Goal: Readiness for Transition of Care  Outcome: Progressing     Problem: Pain Acute  Goal: Optimal Pain Control and Function  Outcome: Progressing  Intervention: Develop Pain Management Plan  Recent Flowsheet Documentation  Taken 3/20/2024 0400 by Neo Hassan RN  Pain Management Interventions: medication (see MAR)  Taken 3/20/2024 0000 by Neo Hassan RN  Pain Management Interventions: medication (see MAR)  Intervention: Optimize Psychosocial Wellbeing  Recent Flowsheet Documentation  Taken 3/20/2024 0000 by Neo Hassan RN  Diversional Activities: television     Problem: Comorbidity Management  Goal: Blood Glucose Levels Within Targeted Range  Outcome: Progressing     Problem: Infection  Goal: Absence of Infection Signs and " Symptoms  Outcome: Progressing     Goal Outcome Evaluation:      Plan of Care Reviewed With: patient    Pt A/O x 4, very pleasant & cooperative but endorsed not sleeping much overnight. Vitally stable & continues saturating well on RA - lungs clear & pt denies SOB. Endorsed moderate lower abdominal pain overnight - well managed with PRN dilaudid. NS continues infusing at 75 ml/hr. Independent in room. Calls appropriately & makes needs known. SURENDRA drains in place & dressings CDI - minimal drainage overnight, see flowsheet. Will continue to monitor.     Neo Hassan RN on 3/20/2024 at 6:33 AM

## 2024-03-21 LAB
CRP SERPL-MCNC: 27.4 MG/L
ERYTHROCYTE [DISTWIDTH] IN BLOOD BY AUTOMATED COUNT: 11.4 % (ref 10–15)
GAMMA INTERFERON BACKGROUND BLD IA-ACNC: 0.02 IU/ML
GLUCOSE BLDC GLUCOMTR-MCNC: 105 MG/DL (ref 70–99)
GLUCOSE BLDC GLUCOMTR-MCNC: 143 MG/DL (ref 70–99)
GLUCOSE BLDC GLUCOMTR-MCNC: 87 MG/DL (ref 70–99)
GLUCOSE BLDC GLUCOMTR-MCNC: 90 MG/DL (ref 70–99)
GLUCOSE BLDC GLUCOMTR-MCNC: 96 MG/DL (ref 70–99)
GLUCOSE BLDC GLUCOMTR-MCNC: 97 MG/DL (ref 70–99)
HCT VFR BLD AUTO: 39.1 % (ref 40–53)
HGB BLD-MCNC: 12.6 G/DL (ref 13.3–17.7)
HOLD SPECIMEN: NORMAL
M TB IFN-G BLD-IMP: ABNORMAL
M TB IFN-G CD4+ BCKGRND COR BLD-ACNC: 0.47 IU/ML
MCH RBC QN AUTO: 27.8 PG (ref 26.5–33)
MCHC RBC AUTO-ENTMCNC: 32.2 G/DL (ref 31.5–36.5)
MCV RBC AUTO: 86 FL (ref 78–100)
MITOGEN IGNF BCKGRD COR BLD-ACNC: 0 IU/ML
MITOGEN IGNF BCKGRD COR BLD-ACNC: 0.01 IU/ML
PLATELET # BLD AUTO: 538 10E3/UL (ref 150–450)
QUANTIFERON MITOGEN: 0.49 IU/ML
QUANTIFERON NIL TUBE: 0.02 IU/ML
QUANTIFERON TB1 TUBE: 0.02 IU/ML
QUANTIFERON TB2 TUBE: 0.03
RBC # BLD AUTO: 4.54 10E6/UL (ref 4.4–5.9)
WBC # BLD AUTO: 8.6 10E3/UL (ref 4–11)

## 2024-03-21 PROCEDURE — 250N000011 HC RX IP 250 OP 636: Mod: JZ | Performed by: STUDENT IN AN ORGANIZED HEALTH CARE EDUCATION/TRAINING PROGRAM

## 2024-03-21 PROCEDURE — 120N000001 HC R&B MED SURG/OB

## 2024-03-21 PROCEDURE — 99232 SBSQ HOSP IP/OBS MODERATE 35: CPT | Performed by: INTERNAL MEDICINE

## 2024-03-21 PROCEDURE — 258N000003 HC RX IP 258 OP 636: Performed by: INTERNAL MEDICINE

## 2024-03-21 PROCEDURE — 86140 C-REACTIVE PROTEIN: CPT | Performed by: INTERNAL MEDICINE

## 2024-03-21 PROCEDURE — 258N000003 HC RX IP 258 OP 636: Performed by: STUDENT IN AN ORGANIZED HEALTH CARE EDUCATION/TRAINING PROGRAM

## 2024-03-21 PROCEDURE — 99233 SBSQ HOSP IP/OBS HIGH 50: CPT | Performed by: STUDENT IN AN ORGANIZED HEALTH CARE EDUCATION/TRAINING PROGRAM

## 2024-03-21 PROCEDURE — 99231 SBSQ HOSP IP/OBS SF/LOW 25: CPT | Performed by: SURGERY

## 2024-03-21 PROCEDURE — 85027 COMPLETE CBC AUTOMATED: CPT | Performed by: SURGERY

## 2024-03-21 PROCEDURE — 36415 COLL VENOUS BLD VENIPUNCTURE: CPT | Performed by: INTERNAL MEDICINE

## 2024-03-21 PROCEDURE — 250N000013 HC RX MED GY IP 250 OP 250 PS 637: Performed by: STUDENT IN AN ORGANIZED HEALTH CARE EDUCATION/TRAINING PROGRAM

## 2024-03-21 RX ORDER — SODIUM CHLORIDE 9 MG/ML
INJECTION, SOLUTION INTRAVENOUS CONTINUOUS
Status: ACTIVE | OUTPATIENT
Start: 2024-03-21 | End: 2024-03-21

## 2024-03-21 RX ADMIN — SODIUM CHLORIDE: 9 INJECTION, SOLUTION INTRAVENOUS at 14:05

## 2024-03-21 RX ADMIN — AMPICILLIN SODIUM AND SULBACTAM SODIUM 3 G: 2; 1 INJECTION, POWDER, FOR SOLUTION INTRAMUSCULAR; INTRAVENOUS at 11:35

## 2024-03-21 RX ADMIN — OXYCODONE HYDROCHLORIDE 5 MG: 5 TABLET ORAL at 04:46

## 2024-03-21 RX ADMIN — AMPICILLIN SODIUM AND SULBACTAM SODIUM 3 G: 2; 1 INJECTION, POWDER, FOR SOLUTION INTRAMUSCULAR; INTRAVENOUS at 16:04

## 2024-03-21 RX ADMIN — AMPICILLIN SODIUM AND SULBACTAM SODIUM 3 G: 2; 1 INJECTION, POWDER, FOR SOLUTION INTRAMUSCULAR; INTRAVENOUS at 23:36

## 2024-03-21 RX ADMIN — SODIUM CHLORIDE: 9 INJECTION, SOLUTION INTRAVENOUS at 05:56

## 2024-03-21 RX ADMIN — AMPICILLIN SODIUM AND SULBACTAM SODIUM 3 G: 2; 1 INJECTION, POWDER, FOR SOLUTION INTRAMUSCULAR; INTRAVENOUS at 04:46

## 2024-03-21 ASSESSMENT — ACTIVITIES OF DAILY LIVING (ADL)
ADLS_ACUITY_SCORE: 22

## 2024-03-21 NOTE — PLAN OF CARE
Problem: Adult Inpatient Plan of Care  Goal: Plan of Care Review  Description: The Plan of Care Review/Shift note should be completed every shift.  The Outcome Evaluation is a brief statement about your assessment that the patient is improving, declining, or no change.  This information will be displayed automatically on your shift  note.  Outcome: Progressing     Problem: Adult Inpatient Plan of Care  Goal: Optimal Comfort and Wellbeing  Intervention: Monitor Pain and Promote Comfort  Recent Flowsheet Documentation  Taken 3/20/2024 2015 by Fox Sommers RN  Pain Management Interventions: medication (see MAR)     Problem: Comorbidity Management  Goal: Blood Glucose Levels Within Targeted Range  Intervention: Monitor and Manage Glycemia  Recent Flowsheet Documentation  Taken 3/21/2024 0030 by Fox Sommers RN  Medication Review/Management: medications reviewed  Taken 3/20/2024 2036 by Fox Sommers RN  Medication Review/Management: medications reviewed   Goal Outcome Evaluation:    Patient AOX4. Able to make needs known. Reported increased abdominal pain 5/10 overnight; administered PRN oxycodone with reports of moderate relief following. SURENDRA drains irrigated and drained. Continue with BG checks Q4h; BG in upper 80's and 90's overnight. Continues to be NPO overnight; able to take PO meds with small sip of water.

## 2024-03-21 NOTE — PLAN OF CARE
Problem: Adult Inpatient Plan of Care  Goal: Absence of Hospital-Acquired Illness or Injury  Intervention: Prevent Skin Injury  Recent Flowsheet Documentation  Taken 3/20/2024 1900 by Lisa Ponce RN  Body Position: (pt independent with positioning) other (see comments)  Taken 3/20/2024 1600 by Lisa Ponce RN  Body Position: position changed independently     Problem: Adult Inpatient Plan of Care  Goal: Optimal Comfort and Wellbeing  Outcome: Progressing     Problem: Pain Acute  Goal: Optimal Pain Control and Function  Outcome: Progressing  Intervention: Prevent or Manage Pain  Recent Flowsheet Documentation  Taken 3/20/2024 1600 by Lisa Ponce RN  Bowel Elimination Promotion: ambulation promoted  Medication Review/Management: medications reviewed  Intervention: Optimize Psychosocial Wellbeing  Recent Flowsheet Documentation  Taken 3/20/2024 1600 by Lisa Ponce RN  Diversional Activities: (cell phone) other (see comments)     Alert, oriented, independent in room. Nothing by mouth to give his bowel a rest.  Blood sugar 86 this evening, patient stating that his glucose has been staying at that level.  IV fluid running at 125cc/hr.  JASMYNE drains irrigated and to suction.  Clear drainage into right jasmyne and cloudy viscus drainage into left jasmyne.  Given tylenol and oxycodone for pain, patient stated effective.       Lisa Ponce RN

## 2024-03-21 NOTE — PROGRESS NOTES
Psychiatric Progress Note        DATA:  Subjective/Behavioral Description:  Patient seen in session along with staff.    Patient says she feels that she has autism and nobody believes her    Patient also states that her family and she believes that she has been seeing a psychiatrist for the last few months whom she says does not believe it also    She says she has traits of autism I asked her which does she was not able to tell me    I did ask her to write about it and if it was a character traits that he had    Very flat affect very withdrawn    Mental Status Evaluation:   Appearance: age appropriate   Behavior:  Flat affect withdrawn   Speech: increased latency of response   Mood: constricted and decreased range   Affect: blunted   Thought Process: concrete   Thought Content: obsessions and suicidal almost delusional   Sensorium: person and place   Cognition: impaired due to Depression   Insight: limited   Judgment: limited        MEDICATIONS:  Current Facility-Administered Medications   Medication Dose Route Frequency Provider Last Rate Last Admin   • citalopram (CeleXA) tablet 40 mg  40 mg Oral Daily Yfn Morgan MD   40 mg at 11/14/23 0841   • QUEtiapine (SEROquel) tablet 50 mg  50 mg Oral Nightly Yfn Morgan MD   50 mg at 11/13/23 2126   • QUEtiapine (SEROquel) tablet 25 mg  25 mg Oral 2 times/day between meals (AM/PM) Yfn Morgan MD   25 mg at 11/14/23 0841   • traZODone (DESYREL) tablet 50 mg  50 mg Oral Nightly PRN Yfn Morgan MD       • benztropine mesylate (COGENTIN) 1 MG/ML injection 1 mg  1 mg Intramuscular Q6H PRN Yfn Morgan MD        Or   • benztropine (COGENTIN) tablet 1 mg  1 mg Oral Q6H PRN Yfn Morgan MD       • hydrOXYzine (ATARAX) tablet 50 mg  50 mg Oral Q4H PRN Yfn Morgan MD   50 mg at 11/12/23 1409   • docusate sodium-sennosides (SENOKOT S) 50-8.6 MG 2 tablet  2 tablet Oral BID PRN Yfn Morgan MD       • bisacodyl (DULCOLAX) suppository 10 mg   [facility]  Infectious Disease   Progress Note     Date of Admission:  3/18/2024    Assessment & Plan   Suspected IBD, but no prior syx,  with perforated colitis/diverticulitis (in young male) and abscess  S/p IR drain  Micro: Enterococcus, and E. Coli, Clostridium perfringens  Diabetes on ozempic  Obesity  No hx of Tb or exposure     PLAN  Continue UNASYN  CRP much better  Plan PO once ready for discharge   Diet being advanced  Stool calprotectin  QFG and AFB stain- still pending  Po Augmentin once cleared for discharge by surgery and diet resumed  Colonoscopy outpatient     Cici Castillo M.D.    ______________________________________________________________________    Interval History   No complaints  No pain nausea sweats or rash  No sweats    Past medical, family, and social history reviewed, unchanged from before.  All 12 systems reviewed, negative except for the above.      Physical Exam   Vital Signs: Temp: 98.3  F (36.8  C) Temp src: Oral BP: 127/73 Pulse: 90   Resp: 18 SpO2: 97 % O2 Device: None (Room air)    Weight: 0 lbs 0 oz  Gen. appearance nontoxic  Eyes no conjunctivitis or icterus  Neck no stiffness or neck vein distention, no LN  Heart  no S3 or murmurs  Lungs clear no wheeze  Abdomen soft not tender  Extremities no synovitis, trace edema  Skin  no rash or emboli  Neurologic alert oriented no focal deficits    Data   Results for orders placed or performed during the hospital encounter of 03/18/24 (from the past 24 hour(s))   Glucose by meter   Result Value Ref Range    GLUCOSE BY METER POCT 89 70 - 99 mg/dL   Glucose by meter   Result Value Ref Range    GLUCOSE BY METER POCT 110 (H) 70 - 99 mg/dL   Glucose by meter   Result Value Ref Range    GLUCOSE BY METER POCT 86 70 - 99 mg/dL   Glucose by meter   Result Value Ref Range    GLUCOSE BY METER POCT 89 70 - 99 mg/dL   Glucose by meter   Result Value Ref Range    GLUCOSE BY METER POCT 90 70 - 99 mg/dL   Glucose by meter    Result Value Ref Range    GLUCOSE BY METER POCT 97 70 - 99 mg/dL   CRP inflammation   Result Value Ref Range    CRP Inflammation 27.40 (H) <5.00 mg/L   Extra Purple Top EDTA (LAB USE ONLY)   Result Value Ref Range    Hold Specimen [facility]    CBC with platelets   Result Value Ref Range    WBC Count 8.6 4.0 - 11.0 10e3/uL    RBC Count 4.54 4.40 - 5.90 10e6/uL    Hemoglobin 12.6 (L) 13.3 - 17.7 g/dL    Hematocrit 39.1 (L) 40.0 - 53.0 %    MCV 86 78 - 100 fL    MCH 27.8 26.5 - 33.0 pg    MCHC 32.2 31.5 - 36.5 g/dL    RDW 11.4 10.0 - 15.0 %    Platelet Count 538 (H) 150 - 450 10e3/uL   Glucose by meter   Result Value Ref Range    GLUCOSE BY METER POCT 96 70 - 99 mg/dL        10 mg Rectal Daily PRN Yfn Morgan MD       • magnesium hydroxide (MILK OF MAGNESIA) 400 MG/5ML suspension 30 mL  30 mL Oral Daily PRN Yfn Morgan MD       • aluminum-magnesium hydroxide-simethicone (MAALOX) 200-200-20 MG/5ML suspension 30 mL  30 mL Oral Q4H PRN Yfn Morgan MD       • LORazepam (ATIVAN) injection 1 mg  1 mg Intramuscular Q6H PRN Yfn Morgan MD        Or   • LORazepam (ATIVAN) tablet 1 mg  1 mg Oral Q6H PRN Yfn Morgan MD       • haloperidol lactate (HALDOL) 5 MG/ML short-acting injection 5 mg  5 mg Intramuscular Q6H PRN Yfn Morgan MD        Or   • haloperidol (HALDOL) tablet 5 mg  5 mg Oral Q6H PRN Yfn Morgan MD       • acetaminophen (TYLENOL) tablet 650 mg  650 mg Oral Q6H PRN Yfn Morgan MD       • nicotine (NICODERM) 21 MG/24HR patch 1 patch  1 patch Transdermal Daily Yfn Morgan MD   1 patch at 11/14/23 0841   • clindamycin (CLEOCIN T) 1 % topical solution   Topical QHS Pepe Queen MD   Given at 11/13/23 2126       LABS:    Recent Labs   Lab 11/12/23  0345   WBC 7.9   HGB 14.2          Recent Labs   Lab 11/12/23  0345   CO2 30   BUN 7   CREATININE 0.80   GLUCOSE 93   CALCIUM 8.3*       No results found    Invalid input(s): \"CBMZ\"     No results found    Invalid input(s): \"TTLCHOL\", \"HDLCHOLSTRL\", \"LDLCA\"    Results for orders placed or performed during the hospital encounter of 11/12/23   Light Blue Top   Result Value    Extra Tube Hold for Add Ons   Light Green Top   Result Value    Extra Tube Hold for Add Ons   Lavender Top   Result Value    Extra Tube Hold for Add Ons   Gold Top   Result Value    Extra Tube Hold for Add Ons   Basic Metabolic Panel   Result Value    Fasting Status     Sodium 143    Potassium 3.5     Comment: Slight hemolysis, result may be falsely increased.    Chloride 110    Carbon Dioxide 30    Anion Gap 7    Glucose 93    BUN 7    Creatinine 0.80    Glomerular Filtration Rate >90     Comment: eGFR  results = or >60 mL/min/1.73m2 = Normal kidney function. Estimated GFR calculated using the CKD-EPI-R (2021) equation that does not include race in the creatinine calculation.    BUN/Cr 9    Calcium 8.3 (L)   Alcohol   Result Value    Alcohol 229 (H)   Drug Abuse Screen, Urine   Result Value    Amphetamines, Urine Negative     Comment: Cutoff = 500 ng/mL    Barbiturates, Urine Negative     Comment: Cutoff = 200 ng/mL    Benzodiazepines, Urine Negative     Comment: Cutoff = 200 ng/mL    Cocaine/ Metabolite, Urine Negative     Comment: Cutoff = 150 ng/ml    Opiates, Urine Negative     Comment: Cutoff = 300 ng/mL    Phencyclidine, Urine Negative     Comment: Cutoff = 25 ng/mL    Cannabinoids, Urine Positive (A)     Comment: Cutoff = 50 ng/mL    Fentanyl, Urine Screen Negative     Comment: Screen Cutoff Concentration: 1.0 ng/mL   CBC with Automated Differential (performable only)   Result Value    WBC 7.9    RBC 4.61    HGB 14.2    HCT 42.7    MCV 92.6    MCH 30.8    MCHC 33.3    RDW-CV 12.8    RDW-SD 43.2        NRBC 0    Neutrophil, Percent 44    Lymphocytes, Percent 47    Mono, Percent 7    Eosinophils, Percent 1    Basophils, Percent 1    Immature Granulocytes 0    Absolute Neutrophils 3.5    Absolute Lymphocytes 3.7    Absolute Monocytes 0.5    Absolute Eosinophils  0.0    Absolute Basophils 0.1    Absolute Immature Granulocytes 0.0   Hepatic Function Panel   Result Value    Albumin 3.9    Bilirubin, Total 0.7    Bilirubin, Direct 0.2    Alkaline Phosphatase 61    GPT/ALT 21    GOT/AST 17     Comment: Slight hemolysis, result may be falsely increased.    Protein, Total 7.2   Glycohemoglobin   Result Value    Hemoglobin A1C 4.7     Comment:   Diabetic Screening  Non Diabetic:             <5.7%  Increased Risk:           5.7-6.4%  Diagnostic For Diabetes:  >6.4%    Diabetic Control  A1C%       eAG mg/dL  6.0            126  6.5            140  7.0            154  7.5            169  8.0            183  8.5             197  9.0            212  9.5            226  10.0           240   Thyroid Stimulating Hormone Reflex   Result Value    TSH 1.980     Comment: Findings most consistent with euthyroid state, no additional testing suggested. TSH may be normal in patients with thyroid dysfunction and pituitary disease. Clinical correlation recommended.    (Reflex TSH algorithm is not recommended in hospitalized patients. A variety of drugs, as well as serious acute and chronic illnesses may alter thyroid function tests. Commonly implicated drugs include glucocorticoids, dopamine, carbamazepine, iodine, amiodarone, lithium and heparin.)  Reference range in pregnancy (if applicable):  First Trimester 0.1 - 4.0 mcUnits/mL  Second Trimester 0.2 - 4.0 mcUnits/mL  Third Trimester 0.3 - 4.5 mcUnits/mL     Lipid Panel With Reflex   Result Value    Cholesterol 173     Comment:   Desirable         <200  Borderline High   200 to 239  High              >=240    Triglycerides 262 (H)     Comment:   Normal            <150  Borderline High   150 to 199  High              200 to 499  Very High         >=500    HDL 60     Comment:   Low              <40  Borderline Low   40 to 49  Near Optimal     50 to 59  Optimal          >=60    LDL 61     Comment:   Optimal           <100  Near Optimal      100 to 129  Borderline High   130 to 159  High              160 to 189  Very High         >=190    Non-HDL Cholesterol 113     Comment:   Therapeutic Target:  CHD and risk equivalents  <130  Multiple risk factors     <160  0 to 1 risk factor        <190    Cholesterol/ HDL Ratio 2.9   Rapid SARS-CoV-2 by PCR    Specimen: Nasal, Mid-turbinate; Swab   Result Value    Rapid SARS-COV-2 by PCR Not Detected    Isolation Guidelines      Comment: Do not use this test result as the sole decision-maker for discontinuation of isolation.   Clinical evaluation should be considered for other respiratory illness requiring transmission-based isolation.    -    No fever  (<99.0 F/37.2 C) for at least 24 hours without the use of fever-reducing medications    AND  -    Respiratory symptoms have improved or resolved (e.g. cough, shortness of breath)     AND  -    COVID-19 negative test    See COVID-19 Deisolation Resource Guide    Procedural Comment      Comment: SARS-CoV-2 nucleic acid has not been detected.     Testing was performed using the [employer] Xpert RT-PCR assay that has been given Emergency Use Authorization (EUA) by the [place] Food and Drug Administration (FDA). These results are considered definitive and do not need to be confirmed by another method.   HCG POC   Result Value    HCG, URINE - POINT OF CARE Negative       Diagnosis:    Major depressive disorder recurrent severe    SY    Suicidal ideation    Alcohol abuse    Polysubstance abuse    Borderline personality disorder    Impulse control disorder      PLAN:   A. Behavioral: Continue present care plan .    B. Medication: Continue present    C.Disposition:D/C planning          Risks benefits and side effects of all medications have been explained to patient and written information given to the patient        Patient continues to meet medical necessary criteria for current level of care.

## 2024-03-21 NOTE — PROGRESS NOTES
General Surgery Progress Note  Mynor Rollins  [id number]    Subjective  No acute events overnight.  Chart reports more abdominal pain last night but he tells me he has no abdominal pain currently.  Passing flatus, no BM.      Objective  Temp:  [98.2  F (36.8  C)-98.6  F (37  C)] 98.3  F (36.8  C)  Pulse:  [83-91] 90  Resp:  [18-20] 18  BP: (118-138)/(70-84) 127/73  SpO2:  [97 %] 97 %    Physical Exam:  Gen: Awake, alert, NAD  CVS: RRR  Resp: NLB  Abd: Soft, nt/nd, both drains now serous  Extrem: WWP    WBC normal  New cx NGTD    Assessment & Plan  Mynor Rollins is a(n) 23 year old year old male with history of obesity and poorly controlled diabetes, transferred from OSH with perforated diverticulitis, IR drain 3/13, new fluid collection s/p IR drain 3/19.     Advance to juliana Goldsmith MD

## 2024-03-21 NOTE — PROGRESS NOTES
[facility]    Medicine Progress Note - Hospitalist Service    Date of Admission:  3/18/2024    Assessment & Plan   Mynor Rollins is a 23 year old male admitted on 3/18/2024.. He has past medical history of obesity and diabetes who was initially admitted to [facility] on 3/8/2024 with left lower quadrant pain.  Workup with CT abdomen on admission showed 3.4 cm possible abscess favoring diverticular abscess..  Patient was febrile with leukocytosis and was started on cefepime and Flagyl.  Patient had a repeat abdomen on 3/12 which showed enlarging abscess and underwent drain placement by IR on 3/13.  Patient had another CT scan on 3/14 which showed interval decrease in the size of the left lower quadrant abscess.  However a new pelvic abscess was seen.  Gm stain and cx of abscess growing  Enterococcus faecalis, Enterococcus Irvine's, E. coli and Clostridium perfringens and antibiotics changed to Unasyn.  Patient had follow-up CT abdomen on 3/18 which shows pelvic abscess continuing to increase in size and therefore patient was transferred to [facility] where he underwent perc drainage of the new pelvic fluid collection      Perforated diverticulitis with Diverticular abscess  -Patient presented with left lower quadrant pain along with other GI symptoms.  Workup revealed patient had sepsis with diverticular abscess seen on the CT scan.  Patient underwent a drain placement on 3/13.  Abscess culture growing Enterococcus faecalis, Enterococcus Mcbride, E. coli and Clostridium perfringens  -Follow-up CT scan shows decreasing in the size of diverticular abscess but developing of new pelvic abscess which is continued to increase in size.  S/p percutaneous drainage by IR 3/19, cultures pending    -Gram stain and culture from abscess growing -Continue IV Unasyn every 6 hours  -General surgery and ID following, appreciate   -Keep n.p.o., IV Unasyn, IVF, pain meds and antiemetics as needed  -CRP  "downtrending      Diabetes mellitus type 2 - BG at goal in the hospital so far   -Poorly controlled as outpatient with A1c of 9  -Home regimen includes Ozempic, hold for now  -sliding scale insulin, Monitor BS  -On hypoglycemia protocol     Obesity   Est BMI 48         Diet: Clear Liquid Diet    DVT Prophylaxis: Pneumatic Compression Devices  Damico Catheter: Not present  Lines: None     Cardiac Monitoring: None  Code Status: Full Code      Clinically Significant Risk Factors                        # DMII: A1C = 9.0 % (Ref range: <5.7 %) within past 6 months, PRESENT ON ADMISSION  # Severe Obesity: Estimated body mass index is 48 kg/m  as calculated from the following:    Height as of 3/8/24: 1.702 m (5' 7\").    Weight as of 3/8/24: 139 kg (306 lb 7 oz)., PRESENT ON ADMISSION     # Financial/Environmental Concerns: none         Disposition Plan      Expected Discharge Date: 03/23/2024    Discharge Delays: IV Medication - consider oral or Home Infusion  Procedure Pending (enter procedure & time in comments)                Giovanni Head MD  Hospitalist Service  [facility]  Securely message with [email or web address] (more info)  Text page via [facility] Paging/Directory   ______________________________________________________________________    Interval History   Patient new to me today.  Chart, labs and imaging results reviewed.  Patient sitting on chair at the bedside.  Denies having pain.  Has 2 abdominal drains which are functional.  Management plan discussed with the patient and he expressed understanding.    Physical Exam   Vital Signs: Temp: 98.3  F (36.8  C) Temp src: Oral BP: 127/73 Pulse: 90   Resp: 18 SpO2: 97 % O2 Device: None (Room air)    Weight: 0 lbs 0 oz    General Appearance: No distress noted  Respiratory: Good air entry bilateral  Cardiovascular: S1 and S2 are heard, no murmur or gallop  GI: Soft abdomen, no tenderness, normoactive bowel  Skin: Intact and warm     Medical Decision " Making       50 MINUTES SPENT BY ME on the date of service doing chart review, history, exam, documentation & further activities per the note.      Data

## 2024-03-21 NOTE — PLAN OF CARE
Problem: Adult Inpatient Plan of Care  Goal: Optimal Comfort and Wellbeing  Outcome: Progressing     Problem: Adult Inpatient Plan of Care  Goal: Readiness for Transition of Care  Outcome: Progressing     Problem: Pain Acute  Goal: Optimal Pain Control and Function  Intervention: Prevent or Manage Pain  Recent Flowsheet Documentation  Taken 3/21/2024 1500 by Yoselin Handy RN  Bowel Elimination Promotion: ambulation promoted  Medication Review/Management: medications reviewed  Taken 3/21/2024 0900 by Yoselin Handy RN  Bowel Elimination Promotion: ambulation promoted  Medication Review/Management: medications reviewed   Goal Outcome Evaluation: pt alert and oriented, denied pain,J.P patent and draining,  irrigated SURENDRA with 10 ml of fluid, pt on clear liquid, tolerating diet well, Pt sitting upright in chair all shift, NS infusing at 75ml/hr, pt passing gas, yet to have a BM.

## 2024-03-22 LAB
ANION GAP SERPL CALCULATED.3IONS-SCNC: 12 MMOL/L (ref 7–15)
BUN SERPL-MCNC: 4.8 MG/DL (ref 6–20)
CALCIUM SERPL-MCNC: 9.5 MG/DL (ref 8.6–10)
CHLORIDE SERPL-SCNC: 101 MMOL/L (ref 98–107)
CREAT SERPL-MCNC: 0.57 MG/DL (ref 0.67–1.17)
DEPRECATED HCO3 PLAS-SCNC: 25 MMOL/L (ref 22–29)
EGFRCR SERPLBLD CKD-EPI 2021: >90 ML/MIN/1.73M2
ERYTHROCYTE [DISTWIDTH] IN BLOOD BY AUTOMATED COUNT: 11.7 % (ref 10–15)
GLUCOSE BLDC GLUCOMTR-MCNC: 101 MG/DL (ref 70–99)
GLUCOSE BLDC GLUCOMTR-MCNC: 103 MG/DL (ref 70–99)
GLUCOSE BLDC GLUCOMTR-MCNC: 107 MG/DL (ref 70–99)
GLUCOSE BLDC GLUCOMTR-MCNC: 111 MG/DL (ref 70–99)
GLUCOSE BLDC GLUCOMTR-MCNC: 114 MG/DL (ref 70–99)
GLUCOSE BLDC GLUCOMTR-MCNC: 151 MG/DL (ref 70–99)
GLUCOSE SERPL-MCNC: 102 MG/DL (ref 70–99)
HCT VFR BLD AUTO: 39.8 % (ref 40–53)
HGB BLD-MCNC: 13 G/DL (ref 13.3–17.7)
MCH RBC QN AUTO: 27.8 PG (ref 26.5–33)
MCHC RBC AUTO-ENTMCNC: 32.7 G/DL (ref 31.5–36.5)
MCV RBC AUTO: 85 FL (ref 78–100)
PLATELET # BLD AUTO: 569 10E3/UL (ref 150–450)
POTASSIUM SERPL-SCNC: 4 MMOL/L (ref 3.4–5.3)
RBC # BLD AUTO: 4.67 10E6/UL (ref 4.4–5.9)
SODIUM SERPL-SCNC: 138 MMOL/L (ref 135–145)
WBC # BLD AUTO: 8.6 10E3/UL (ref 4–11)

## 2024-03-22 PROCEDURE — 36415 COLL VENOUS BLD VENIPUNCTURE: CPT | Performed by: STUDENT IN AN ORGANIZED HEALTH CARE EDUCATION/TRAINING PROGRAM

## 2024-03-22 PROCEDURE — 85027 COMPLETE CBC AUTOMATED: CPT | Performed by: STUDENT IN AN ORGANIZED HEALTH CARE EDUCATION/TRAINING PROGRAM

## 2024-03-22 PROCEDURE — 99231 SBSQ HOSP IP/OBS SF/LOW 25: CPT | Mod: FS | Performed by: SURGERY

## 2024-03-22 PROCEDURE — 3E02340 INTRODUCTION OF INFLUENZA VACCINE INTO MUSCLE, PERCUTANEOUS APPROACH: ICD-10-PCS | Performed by: STUDENT IN AN ORGANIZED HEALTH CARE EDUCATION/TRAINING PROGRAM

## 2024-03-22 PROCEDURE — 90686 IIV4 VACC NO PRSV 0.5 ML IM: CPT | Performed by: INTERNAL MEDICINE

## 2024-03-22 PROCEDURE — 250N000011 HC RX IP 250 OP 636: Mod: JZ | Performed by: STUDENT IN AN ORGANIZED HEALTH CARE EDUCATION/TRAINING PROGRAM

## 2024-03-22 PROCEDURE — 99233 SBSQ HOSP IP/OBS HIGH 50: CPT | Performed by: STUDENT IN AN ORGANIZED HEALTH CARE EDUCATION/TRAINING PROGRAM

## 2024-03-22 PROCEDURE — 99232 SBSQ HOSP IP/OBS MODERATE 35: CPT | Performed by: INTERNAL MEDICINE

## 2024-03-22 PROCEDURE — G0008 ADMIN INFLUENZA VIRUS VAC: HCPCS | Performed by: INTERNAL MEDICINE

## 2024-03-22 PROCEDURE — 250N000011 HC RX IP 250 OP 636: Performed by: INTERNAL MEDICINE

## 2024-03-22 PROCEDURE — 83993 ASSAY FOR CALPROTECTIN FECAL: CPT | Performed by: INTERNAL MEDICINE

## 2024-03-22 PROCEDURE — 80048 BASIC METABOLIC PNL TOTAL CA: CPT | Performed by: STUDENT IN AN ORGANIZED HEALTH CARE EDUCATION/TRAINING PROGRAM

## 2024-03-22 PROCEDURE — 120N000001 HC R&B MED SURG/OB

## 2024-03-22 RX ADMIN — AMPICILLIN SODIUM AND SULBACTAM SODIUM 3 G: 2; 1 INJECTION, POWDER, FOR SOLUTION INTRAMUSCULAR; INTRAVENOUS at 17:14

## 2024-03-22 RX ADMIN — AMPICILLIN SODIUM AND SULBACTAM SODIUM 3 G: 2; 1 INJECTION, POWDER, FOR SOLUTION INTRAMUSCULAR; INTRAVENOUS at 10:42

## 2024-03-22 RX ADMIN — AMPICILLIN SODIUM AND SULBACTAM SODIUM 3 G: 2; 1 INJECTION, POWDER, FOR SOLUTION INTRAMUSCULAR; INTRAVENOUS at 05:16

## 2024-03-22 RX ADMIN — AMPICILLIN SODIUM AND SULBACTAM SODIUM 3 G: 2; 1 INJECTION, POWDER, FOR SOLUTION INTRAMUSCULAR; INTRAVENOUS at 22:07

## 2024-03-22 RX ADMIN — INFLUENZA A VIRUS A/VICTORIA/4897/2022 IVR-238 (H1N1) ANTIGEN (FORMALDEHYDE INACTIVATED), INFLUENZA A VIRUS A/DARWIN/9/2021 SAN-010 (H3N2) ANTIGEN (FORMALDEHYDE INACTIVATED), INFLUENZA B VIRUS B/PHUKET/3073/2013 ANTIGEN (FORMALDEHYDE INACTIVATED), AND INFLUENZA B VIRUS B/MICHIGAN/01/2021 ANTIGEN (FORMALDEHYDE INACTIVATED) 0.5 ML: 15; 15; 15; 15 INJECTION, SUSPENSION INTRAMUSCULAR at 10:41

## 2024-03-22 ASSESSMENT — ACTIVITIES OF DAILY LIVING (ADL)
ADLS_ACUITY_SCORE: 24
ADLS_ACUITY_SCORE: 22
ADLS_ACUITY_SCORE: 22
ADLS_ACUITY_SCORE: 24
ADLS_ACUITY_SCORE: 22
ADLS_ACUITY_SCORE: 24
ADLS_ACUITY_SCORE: 22
ADLS_ACUITY_SCORE: 24
ADLS_ACUITY_SCORE: 22
ADLS_ACUITY_SCORE: 24
ADLS_ACUITY_SCORE: 24
ADLS_ACUITY_SCORE: 22
ADLS_ACUITY_SCORE: 24
ADLS_ACUITY_SCORE: 22

## 2024-03-22 NOTE — PROGRESS NOTES
General Surgery Progress Note:    Hospital Day # 3    ASSESSMENT:   1. Diverticulitis of large intestine with abscess without bleeding        Mynor Rollins is a 23 year old male with a history of obesity and poorly controlled diabetes who presented with perforated diverticulitis. 2 drains placed by IR drain on 3/14 and 3/19. Denies abdominal pain. Tolerating liquid diet with no nausea, vomiting. Advance to low fiber diet. Discussed importance of weight loss and managing his diabetes. Bariatric consult placed. Plan follow-up with general surgery with colonoscopy in 6-8 weeks.    PLAN:   - Advance to low fiber diet  - Drain care per IR  - Discussed importance of getting diabetes under control  - Bariatric consult placed to discuss weight loss  - Follow-up with general surgery with colonoscopy in 6-8 weeks.   - Medical management per primary team  - General surgery will continue to follow    SUBJECTIVE:   Mynor Rollins is doing well today. Denies abdominal pain. Passing gas and 1 soft BM this morning. Tolerating liquid diet with no nausea, vomiting. Up in chair and ambulating in the room.     Patient Vitals for the past 24 hrs:   BP Temp Temp src Pulse Resp SpO2   03/22/24 0853 118/69 98.2  F (36.8  C) Oral 84 20 98 %   03/21/24 2343 118/69 97.9  F (36.6  C) Oral 96 17 96 %   03/21/24 1534 128/71 98  F (36.7  C) Oral 93 18 96 %     Physical Exam:  General: patient seen up in chair, no acute distress  Resp: no respiratory distress, breathing comfortably on RA  Abdomen: soft, non-distended, non-tender, 2 drains in RLQ and LLQ with serous output  Extremities: warm and well perfused    Admission on 03/18/2024   Component Date Value    GLUCOSE BY METER POCT 03/18/2024 127 (H)     Magnesium 03/19/2024 2.1     Sodium 03/19/2024 139     Potassium 03/19/2024 3.6     Chloride 03/19/2024 101     Carbon Dioxide (CO2) 03/19/2024 25     Anion Gap 03/19/2024 13     Urea Nitrogen 03/19/2024 4.2 (L)     Creatinine 03/19/2024 0.58  (L)     GFR Estimate 03/19/2024 >90     Calcium 03/19/2024 8.9     Glucose 03/19/2024 124 (H)     WBC Count 03/19/2024 10.8     RBC Count 03/19/2024 4.66     Hemoglobin 03/19/2024 12.9 (L)     Hematocrit 03/19/2024 40.5     MCV 03/19/2024 87     MCH 03/19/2024 27.7     MCHC 03/19/2024 31.9     RDW 03/19/2024 11.6     Platelet Count 03/19/2024 492 (H)     GLUCOSE BY METER POCT 03/19/2024 127 (H)     GLUCOSE BY METER POCT 03/19/2024 112 (H)     Culture 03/19/2024 No growth after 2 days     Culture 03/19/2024 No anaerobic organisms isolated after 2 days     GLUCOSE BY METER POCT 03/19/2024 100 (H)     GLUCOSE BY METER POCT 03/19/2024 97     Acid Fast Stain 03/19/2024 No acid fast bacilli seen     Acid Fast Stain 03/19/2024 No acid fast bacilli seen     GLUCOSE BY METER POCT 03/19/2024 112 (H)     WBC Count 03/20/2024 11.0     RBC Count 03/20/2024 4.52     Hemoglobin 03/20/2024 12.4 (L)     Hematocrit 03/20/2024 39.6 (L)     MCV 03/20/2024 88     MCH 03/20/2024 27.4     MCHC 03/20/2024 31.3 (L)     RDW 03/20/2024 11.7     Platelet Count 03/20/2024 519 (H)     Quantiferon Nil Tube 03/20/2024 0.02     Quantiferon TB1 Tube 03/20/2024 0.02     Quantiferon TB2 Tube 03/20/2024 0.03     Quantiferon Mitogen 03/20/2024 0.49     Hold Specimen 03/20/2024 JIC     Hold Specimen 03/20/2024 JIC     GLUCOSE BY METER POCT 03/20/2024 87     GLUCOSE BY METER POCT 03/20/2024 89     GLUCOSE BY METER POCT 03/20/2024 110 (H)     GLUCOSE BY METER POCT 03/20/2024 86     GLUCOSE BY METER POCT 03/20/2024 89     CRP Inflammation 03/21/2024 27.40 (H)     GLUCOSE BY METER POCT 03/21/2024 90     GLUCOSE BY METER POCT 03/21/2024 97     Hold Specimen 03/21/2024 JIC     WBC Count 03/21/2024 8.6     RBC Count 03/21/2024 4.54     Hemoglobin 03/21/2024 12.6 (L)     Hematocrit 03/21/2024 39.1 (L)     MCV 03/21/2024 86     MCH 03/21/2024 27.8     MCHC 03/21/2024 32.2     RDW 03/21/2024 11.4     Platelet Count 03/21/2024 538 (H)     GLUCOSE BY METER POCT  03/21/2024 96     Quantiferon-TB Gold Plus 03/20/2024 Indeterminate (A)     TB1 Ag minus Nil Value 03/20/2024 0.00     TB2 Ag minus Nil Value 03/20/2024 0.01     Mitogen minus Nil Result 03/20/2024 0.47     Nil Result 03/20/2024 0.02     GLUCOSE BY METER POCT 03/21/2024 143 (H)     GLUCOSE BY METER POCT 03/21/2024 87     GLUCOSE BY METER POCT 03/21/2024 105 (H)     GLUCOSE BY METER POCT 03/22/2024 107 (H)     GLUCOSE BY METER POCT 03/22/2024 111 (H)     WBC Count 03/22/2024 8.6     RBC Count 03/22/2024 4.67     Hemoglobin 03/22/2024 13.0 (L)     Hematocrit 03/22/2024 39.8 (L)     MCV 03/22/2024 85     MCH 03/22/2024 27.8     MCHC 03/22/2024 32.7     RDW 03/22/2024 11.7     Platelet Count 03/22/2024 569 (H)     Sodium 03/22/2024 138     Potassium 03/22/2024 4.0     Chloride 03/22/2024 101     Carbon Dioxide (CO2) 03/22/2024 25     Anion Gap 03/22/2024 12     Urea Nitrogen 03/22/2024 4.8 (L)     Creatinine 03/22/2024 0.57 (L)     GFR Estimate 03/22/2024 >90     Calcium 03/22/2024 9.5     Glucose 03/22/2024 102 (H)     GLUCOSE BY METER POCT 03/22/2024 103 (H)         Erica Burkett PA-C  [facility] General Surgery  [address]  [address]  [place] [address]

## 2024-03-22 NOTE — PLAN OF CARE
Problem: Infection  Goal: Absence of Infection Signs and Symptoms  Outcome: Progressing   Goal Outcome Evaluation:         Denies pain. Started on low fiber diet today. Patient tolerating oral intake well. Left SURENDRA output 5mL clear yellow with mild sediment. Right SURENDRA output 15mL clear yellow. SURENDRA lines irrigated. Leaking noted to left SURENDRA when flushing, moderate yellow drainage. Dressings to SURENDRA changed. Independent. A&Ox4.

## 2024-03-22 NOTE — PROGRESS NOTES
[facility]  Infectious Disease   Progress Note     Date of Admission:  3/18/2024    Assessment & Plan   Suspected IBD, but no prior syx,  with perforated colitis/diverticulitis (in young male) and abscess  S/p IR drain  Micro: Enterococcus, and E. Coli, Clostridium perfringens  AFB stain negative, QFG indeterminate (can have PPD)  Diabetes on ozempic  Obesity  No hx of Tb or exposure     PLAN  Continue UNASYN  CRP much better  Plan PO once ready for discharge   Diet being advanced  Stool calprotectin    Po Augmentin once cleared for discharge by surgery and diet resumed  Colonoscopy outpatient    ID recheck Monday if still here     Cici Castillo M.D.    ______________________________________________________________________    Interval History   No complaints  Feels OK  No pain nausea sweats or rash  No sweats  Diet advanced    Past medical, family, and social history reviewed, unchanged from before.  All 12 systems reviewed, negative except for the above.      Physical Exam   Vital Signs: Temp: 98.2  F (36.8  C) Temp src: Oral BP: 118/69 Pulse: 84   Resp: 20 SpO2: 98 % O2 Device: None (Room air)    Weight: 0 lbs 0 oz  Gen. appearance nontoxic  Eyes no conjunctivitis or icterus  Neck no stiffness or neck vein distention, no LN  Heart  no S3 or murmurs  Lungs clear no wheeze  Abdomen soft not tender  Extremities no synovitis, trace edema  Skin  no rash or emboli  Neurologic alert oriented no focal deficits    Data   Results for orders placed or performed during the hospital encounter of 03/18/24 (from the past 24 hour(s))   Glucose by meter   Result Value Ref Range    GLUCOSE BY METER POCT 143 (H) 70 - 99 mg/dL   Glucose by meter   Result Value Ref Range    GLUCOSE BY METER POCT 87 70 - 99 mg/dL   Glucose by meter   Result Value Ref Range    GLUCOSE BY METER POCT 105 (H) 70 - 99 mg/dL   Glucose by meter   Result Value Ref Range    GLUCOSE BY METER POCT 107 (H) 70 - 99 mg/dL   Glucose by  meter   Result Value Ref Range    GLUCOSE BY METER POCT 111 (H) 70 - 99 mg/dL   Glucose by meter   Result Value Ref Range    GLUCOSE BY METER POCT 103 (H) 70 - 99 mg/dL   CBC with platelets   Result Value Ref Range    WBC Count 8.6 4.0 - 11.0 10e3/uL    RBC Count 4.67 4.40 - 5.90 10e6/uL    Hemoglobin 13.0 (L) 13.3 - 17.7 g/dL    Hematocrit 39.8 (L) 40.0 - 53.0 %    MCV 85 78 - 100 fL    MCH 27.8 26.5 - 33.0 pg    MCHC 32.7 31.5 - 36.5 g/dL    RDW 11.7 10.0 - 15.0 %    Platelet Count 569 (H) 150 - 450 10e3/uL   Basic metabolic panel   Result Value Ref Range    Sodium 138 135 - 145 mmol/L    Potassium 4.0 3.4 - 5.3 mmol/L    Chloride 101 98 - 107 mmol/L    Carbon Dioxide (CO2) 25 22 - 29 mmol/L    Anion Gap 12 7 - 15 mmol/L    Urea Nitrogen 4.8 (L) 6.0 - 20.0 mg/dL    Creatinine 0.57 (L) 0.67 - 1.17 mg/dL    GFR Estimate >90 >60 mL/min/1.73m2    Calcium 9.5 8.6 - 10.0 mg/dL    Glucose 102 (H) 70 - 99 mg/dL

## 2024-03-22 NOTE — CONSULTS
BARIATRIC IP CONSULT:    Spoke to patient about medical versus surgical weight loss and he would be interested in discussing this further. We will get the patient on the schedule in the clinic next week to discuss options to help him with weight loss prior to having his colon resection.    DEBORAH Rabago CNP  [phone]  [facility] General and Bariatric Surgery

## 2024-03-22 NOTE — PROGRESS NOTES
[facility]    Medicine Progress Note - Hospitalist Service    Date of Admission:  3/18/2024    Assessment & Plan   Mynor Rollins is a 23 year old male admitted on 3/18/2024.. He has past medical history of obesity and diabetes who was initially admitted to [facility] on 3/8/2024 with left lower quadrant pain.  Workup with CT abdomen on admission showed 3.4 cm possible abscess favoring diverticular abscess..  Patient was febrile with leukocytosis and was started on cefepime and Flagyl.  Patient had a repeat abdomen on 3/12 which showed enlarging abscess and underwent drain placement by IR on 3/13.  Patient had another CT scan on 3/14 which showed interval decrease in the size of the left lower quadrant abscess.  However a new pelvic abscess was seen.  Gm stain and cx of abscess growing  Enterococcus faecalis, Enterococcus Spickard's, E. coli and Clostridium perfringens and antibiotics changed to Unasyn.  Patient had follow-up CT abdomen on 3/18 which shows pelvic abscess continuing to increase in size and therefore patient was transferred to [facility] where he underwent perc drainage of the new pelvic fluid collection      Perforated diverticulitis with Diverticular abscess  -Patient presented with left lower quadrant pain along with other GI symptoms.  Workup revealed patient had sepsis with diverticular abscess seen on the CT scan.  Patient underwent a drain placement on 3/13.  Abscess culture growing Enterococcus faecalis, Enterococcus Mcbride, E. coli and Clostridium perfringens  -Follow-up CT scan shows decreasing in the size of diverticular abscess but developing of new pelvic abscess which is continued to increase in size.  S/p percutaneous drainage by IR 3/19, cultures pending    -Gram stain and culture from abscess growing -Continue IV Unasyn every 6 hours  -General surgery and ID following, appreciate   - IV Unasyn  -CRP downtrending   -Diet upgraded to low fiber  - drain care  "per IR  -Plan for outpatient colonoscopy in 4 to 6 weeks  - bariatric surgery consulted to help patient with weight loss     Diabetes mellitus type 2 - BG at goal in the hospital so far   -Poorly controlled as outpatient with A1c of 9  -Home regimen includes Ozempic, hold for now  -sliding scale insulin, Monitor BS  -On hypoglycemia protocol     Obesity   Est BMI 48   Bariatric surgery consult        Diet: Low Fiber Diet    DVT Prophylaxis: Pneumatic Compression Devices  Damico Catheter: Not present  Lines: None     Cardiac Monitoring: None  Code Status: Full Code      Clinically Significant Risk Factors                        # DMII: A1C = 9.0 % (Ref range: <5.7 %) within past 6 months, PRESENT ON ADMISSION  # Severe Obesity: Estimated body mass index is 48 kg/m  as calculated from the following:    Height as of 3/8/24: 1.702 m (5' 7\").    Weight as of 3/8/24: 139 kg (306 lb 7 oz)., PRESENT ON ADMISSION       # Financial/Environmental Concerns: none         Disposition Plan      Expected Discharge Date: 03/23/2024    Discharge Delays: IV Medication - consider oral or Home Infusion  Procedure Pending (enter procedure & time in comments)                Giovanni Head MD  Hospitalist Service  [facility]  Securely message with [employer] (more info)  Text page via [employer] Paging/Directory   ______________________________________________________________________    Interval History   Patient sitting on chair at the bedside.  No distress noted.  Has been tolerating liquid diet so far.  Diet has been advanced to low fiber.  Management plan discussed with the patient and he expressed understanding.  Physical Exam   Vital Signs: Temp: 98.4  F (36.9  C) Temp src: Oral BP: 124/69 Pulse: 92   Resp: 20 SpO2: 96 % O2 Device: None (Room air)    Weight: 0 lbs 0 oz    General Appearance: No distress noted  Respiratory: Good air entry bilateral  Cardiovascular: S1 and S2 are heard, no murmur or gallop  GI: " Soft abdomen, no tenderness, normoactive bowel  Skin: Intact and warm     Medical Decision Making       52 MINUTES SPENT BY ME on the date of service doing chart review, history, exam, documentation & further activities per the note.      Data

## 2024-03-23 VITALS
TEMPERATURE: 98.5 F | RESPIRATION RATE: 18 BRPM | SYSTOLIC BLOOD PRESSURE: 122 MMHG | DIASTOLIC BLOOD PRESSURE: 78 MMHG | OXYGEN SATURATION: 97 % | HEART RATE: 94 BPM

## 2024-03-23 LAB
ANION GAP SERPL CALCULATED.3IONS-SCNC: 13 MMOL/L (ref 7–15)
BUN SERPL-MCNC: 7.8 MG/DL (ref 6–20)
CALCIUM SERPL-MCNC: 9.6 MG/DL (ref 8.6–10)
CHLORIDE SERPL-SCNC: 102 MMOL/L (ref 98–107)
CREAT SERPL-MCNC: 0.57 MG/DL (ref 0.67–1.17)
DEPRECATED HCO3 PLAS-SCNC: 24 MMOL/L (ref 22–29)
EGFRCR SERPLBLD CKD-EPI 2021: >90 ML/MIN/1.73M2
ERYTHROCYTE [DISTWIDTH] IN BLOOD BY AUTOMATED COUNT: 11.7 % (ref 10–15)
GLUCOSE BLDC GLUCOMTR-MCNC: 105 MG/DL (ref 70–99)
GLUCOSE BLDC GLUCOMTR-MCNC: 114 MG/DL (ref 70–99)
GLUCOSE BLDC GLUCOMTR-MCNC: 130 MG/DL (ref 70–99)
GLUCOSE SERPL-MCNC: 119 MG/DL (ref 70–99)
HCT VFR BLD AUTO: 41.3 % (ref 40–53)
HGB BLD-MCNC: 13.3 G/DL (ref 13.3–17.7)
MCH RBC QN AUTO: 27.4 PG (ref 26.5–33)
MCHC RBC AUTO-ENTMCNC: 32.2 G/DL (ref 31.5–36.5)
MCV RBC AUTO: 85 FL (ref 78–100)
PLATELET # BLD AUTO: 557 10E3/UL (ref 150–450)
POTASSIUM SERPL-SCNC: 4.2 MMOL/L (ref 3.4–5.3)
RBC # BLD AUTO: 4.86 10E6/UL (ref 4.4–5.9)
SODIUM SERPL-SCNC: 139 MMOL/L (ref 135–145)
WBC # BLD AUTO: 10.2 10E3/UL (ref 4–11)

## 2024-03-23 PROCEDURE — 250N000011 HC RX IP 250 OP 636: Mod: JZ | Performed by: STUDENT IN AN ORGANIZED HEALTH CARE EDUCATION/TRAINING PROGRAM

## 2024-03-23 PROCEDURE — 80048 BASIC METABOLIC PNL TOTAL CA: CPT | Performed by: STUDENT IN AN ORGANIZED HEALTH CARE EDUCATION/TRAINING PROGRAM

## 2024-03-23 PROCEDURE — 99239 HOSP IP/OBS DSCHRG MGMT >30: CPT | Performed by: STUDENT IN AN ORGANIZED HEALTH CARE EDUCATION/TRAINING PROGRAM

## 2024-03-23 PROCEDURE — 85027 COMPLETE CBC AUTOMATED: CPT | Performed by: STUDENT IN AN ORGANIZED HEALTH CARE EDUCATION/TRAINING PROGRAM

## 2024-03-23 PROCEDURE — 36415 COLL VENOUS BLD VENIPUNCTURE: CPT | Performed by: STUDENT IN AN ORGANIZED HEALTH CARE EDUCATION/TRAINING PROGRAM

## 2024-03-23 RX ADMIN — AMPICILLIN SODIUM AND SULBACTAM SODIUM 3 G: 2; 1 INJECTION, POWDER, FOR SOLUTION INTRAMUSCULAR; INTRAVENOUS at 05:24

## 2024-03-23 RX ADMIN — AMPICILLIN SODIUM AND SULBACTAM SODIUM 3 G: 2; 1 INJECTION, POWDER, FOR SOLUTION INTRAMUSCULAR; INTRAVENOUS at 11:44

## 2024-03-23 ASSESSMENT — ACTIVITIES OF DAILY LIVING (ADL)
ADLS_ACUITY_SCORE: 24

## 2024-03-23 NOTE — PLAN OF CARE
Problem: Adult Inpatient Plan of Care  Goal: Plan of Care Review  Description: The Plan of Care Review/Shift note should be completed every shift.  The Outcome Evaluation is a brief statement about your assessment that the patient is improving, declining, or no change.  This information will be displayed automatically on your shift  note.  Outcome: Progressing       Problem: Pain Acute  Goal: Optimal Pain Control and Function  Outcome: Progressing  Intervention: Prevent or Manage Pain  Recent Flowsheet Documentation  Taken 3/22/2024 1615 by Logan Huddleston RN  Bowel Elimination Promotion:   adequate fluid intake promoted   ambulation promoted  Medication Review/Management: medications reviewed  Intervention: Optimize Psychosocial Wellbeing  Recent Flowsheet Documentation  Taken 3/22/2024 1615 by Logan Huddleston RN  Supportive Measures:   active listening utilized   self-care encouraged  Diversional Activities: television     Problem: Comorbidity Management  Goal: Blood Glucose Levels Within Targeted Range  Outcome: Progressing  Intervention: Monitor and Manage Glycemia  Recent Flowsheet Documentation  Taken 3/22/2024 1615 by Logan Huddleston RN  Medication Review/Management: medications reviewed     Patient is alert and orientated, able to make needs known. Denies pain throughout shift. Up independent in his room. Tolerating low fiber diet. Left drain had and output of 15 ml of yellow, milky drainage. Irrigated tube, noticed that while irrigating tube, was leaking from insertion site. Changed dressing to site. Right tube emptied of 5 ml yellow clear output. No drainage noted around insertion site.     Logan Huddleston RN

## 2024-03-23 NOTE — DISCHARGE SUMMARY
"[facility]  Hospitalist Discharge Summary      Date of Admission:  3/18/2024  Date of Discharge:  3/23/2024  4:20 PM  Discharging Provider: Giovanni Head MD  Discharge Service: Hospitalist Service    Discharge Diagnoses   Perforated diverticulitis with diverticular abscess  Type 2 diabetes mellitus  Obesity    Clinically Significant Risk Factors     # DMII: A1C = 9.0 % (Ref range: <5.7 %) within past 6 months  # Severe Obesity: Estimated body mass index is 48 kg/m  as calculated from the following:    Height as of 3/8/24: 1.702 m (5' 7\").    Weight as of 3/8/24: 139 kg (306 lb 7 oz).       Follow-ups Needed After Discharge   Follow-up Appointments     Follow-up and recommended labs and tests       Follow up with primary care provider, Physician No Ref-Primary, within 7   days for hospital follow- up.  The following labs/tests are recommended:   Colonoscopy in 4 to 6 weeks.            Unresulted Labs Ordered in the Past 30 Days of this Admission       Date and Time Order Name Status Description    3/19/2024  3:28 PM Calprotectin Feces In process     3/19/2024  3:24 PM Acid-Fast Bacilli Culture and Stain with AFB Stain In process     3/19/2024 11:44 AM Anaerobic Bacterial Culture Routine Preliminary     3/19/2024 11:44 AM Abscess Aerobic Bacterial Culture Routine Without Gram Stain Preliminary         These results will be followed up by PCP    Discharge Disposition   Discharged to home  Condition at discharge: Stable    Hospital Course   Mynor Rollins is a 23 year old male admitted on 3/18/2024.. He has past medical history of obesity and diabetes who was initially admitted to [facility] on 3/8/2024 with left lower quadrant pain.  Workup with CT abdomen on admission showed 3.4 cm possible abscess favoring diverticular abscess..  Patient was febrile with leukocytosis and was started on cefepime and Flagyl.  Patient had a repeat abdomen on 3/12 which showed enlarging abscess and " underwent drain placement by IR on 3/13.  Patient had another CT scan on 3/14 which showed interval decrease in the size of the left lower quadrant abscess.  However a new pelvic abscess was seen.  Gm stain and cx of abscess growing  Enterococcus faecalis, Enterococcus Williamson's, E. coli and Clostridium perfringens and antibiotics changed to Unasyn.  Patient had follow-up CT abdomen on 3/18 which shows pelvic abscess continuing to increase in size and therefore patient was transferred to [facility] where he underwent perc drainage of the new pelvic fluid collection    Perforated diverticulitis with Diverticular abscess  -Patient presented with left lower quadrant pain along with other GI symptoms.  Workup revealed patient had sepsis with diverticular abscess seen on the CT scan.  Patient underwent a drain placement on 3/13.  Abscess culture growing Enterococcus faecalis, Enterococcus Mcbride, E. coli and Clostridium perfringens  -Follow-up CT scan shows decreasing in the size of diverticular abscess but developing of new pelvic abscess which is continued to increase in size.  S/p percutaneous drainage by IR 3/19, cultures pending    -Gram stain and culture from abscess growing -Continue IV Unasyn every 6 hours  -General surgery and ID following, appreciate   - IV Unasyn. Switched to Augmentin for 10 more days   -CRP downtrending   -Diet upgraded to low fiber and is tolerating well  - drain care per IR  -Plan for outpatient colonoscopy in 4 to 6 weeks  - bariatric surgery consulted to help patient with weight loss   Diabetes mellitus type 2 - BG at goal in the hospital so far   -Poorly controlled as outpatient with A1c of 9  -Home regimen includes Ozempic, hold for now  -sliding scale insulin, Monitor BS  -On hypoglycemia protocol   Obesity   Est BMI 48   Bariatric surgery consult    Patient is clinically stable enough to be discharged home.  Cleared by surgery and ID for discharge.  Medication sent to his  pharmacy    Consultations This Hospital Stay   SURGERY GENERAL IP CONSULT  INTERVENTIONAL RADIOLOGY ADULT/PEDS IP CONSULT  SURGERY GENERAL IP CONSULT  INFECTIOUS DISEASES IP CONSULT  COLORECTAL SURGERY IP CONSULT  GASTROENTEROLOGY IP CONSULT  CARE MANAGEMENT / SOCIAL WORK IP CONSULT  BARIATRIC SURGERY IP CONSULT    Code Status   Full Code    Time Spent on this Encounter   IGiovanni MD, personally saw the patient today and spent greater than 30 minutes discharging this patient.       Giovanni Head MD  [address] [address]  Phone: [phone]  Fax: [phone]  ______________________________________________________________________    Physical Exam   Vital Signs: Temp: 98.5  F (36.9  C) Temp src: Oral BP: 122/78 Pulse: 94   Resp: 18 SpO2: 97 % O2 Device: None (Room air)    Weight: 0 lbs 0 oz  General Appearance:  No distress noted  Respiratory: Good air entry bilateral  Cardiovascular: S1 and S2 are heard, no murmur or gallop  GI: Soft abdomen, no tenderness, normoactive bowel  Skin: Intact and warm          Primary Care Physician   Physician No Ref-Primary    Discharge Orders      Reason for your hospital stay    Diverticulitis of large  With abscess     Follow-up and recommended labs and tests     Follow up with primary care provider, Physician No Ref-Primary, within 7 days for hospital follow- up.  The following labs/tests are recommended: Colonoscopy in 4 to 6 weeks.     Activity    Your activity upon discharge: ambulate in house     Diet    Follow this diet upon discharge: Orders Placed This Encounter      Low Fiber Diet       Significant Results and Procedures     Discharge Medications   Current Discharge Medication List        START taking these medications    Details   amoxicillin-clavulanate (AUGMENTIN) 875-125 MG tablet Take 1 tablet by mouth 2 times daily for 10 days  Qty: 20 tablet, Refills: 0    Associated Diagnoses:  Diverticulitis of large intestine with abscess without bleeding           CONTINUE these medications which have CHANGED    Details   sodium chloride, PF, 0.9% PF flush Irrigate with 10 mLs as directed every 24 hours for 60 days Flush each drain with 10ml daily.  Qty: 300 mL, Refills: 1    Associated Diagnoses: Diverticulitis of large intestine with abscess without bleeding           CONTINUE these medications which have NOT CHANGED    Details   blood glucose (NO BRAND SPECIFIED) lancets standard Use to test blood sugar 4 times daily or as directed.  Qty: 200 each, Refills: 0    Associated Diagnoses: Diabetic ketoacidosis without coma associated with diabetes mellitus due to underlying condition (H)      blood glucose (NO BRAND SPECIFIED) test strip Use to test blood sugar 4 times daily or as directed.  Qty: 200 strip, Refills: 1    Associated Diagnoses: Diabetic ketoacidosis without coma associated with diabetes mellitus due to underlying condition (H)      blood glucose monitoring (NO BRAND SPECIFIED) meter device kit Use to test blood sugar 4 times daily or as directed.  Qty: 1 kit, Refills: 0    Associated Diagnoses: Diabetic ketoacidosis without coma associated with diabetes mellitus due to underlying condition (H)      insulin pen needle (32G X 4 MM) 32G X 4 MM miscellaneous Use 5 pen needles daily or as directed.  Qty: 200 each, Refills: 1    Associated Diagnoses: Diabetic ketoacidosis without coma associated with diabetes mellitus due to underlying condition (H)      OZEMPIC, 1 MG/DOSE, 4 MG/3ML pen Inject 1 mg Subcutaneous every 7 days On Monday.           Allergies   No Known Allergies

## 2024-03-23 NOTE — PLAN OF CARE
Problem: Adult Inpatient Plan of Care  Goal: Plan of Care Review  Description: The Plan of Care Review/Shift note should be completed every shift.  The Outcome Evaluation is a brief statement about your assessment that the patient is improving, declining, or no change.  This information will be displayed automatically on your shift  note.  Outcome: Adequate for Care Transition    Reviewed discharge instructions with patient and mother. All questions answered and encouraged. Discharged home.     Logan Huddleston RN

## 2024-03-23 NOTE — PROGRESS NOTES
GENERAL SURGERY PROGRESS NOTE - CHART CHECK    Mynor Rollins is a 23 year old male with a history of obesity and poorly controlled diabetes who presented with perforated diverticulitis. Patient not seen this morning. No significant events/changes noted on chart review.    General surgery clinic will call patient to have colonoscopy and follow up scheduled  Follow up with Bariatric clinic as scheduled  Drain management per IR  Abx per [facility]  Surgery will sign off    DEBORAH Rabago CNP  [phone]  [facility] General and Bariatric Surgery

## 2024-03-23 NOTE — PLAN OF CARE
Problem: Adult Inpatient Plan of Care  Goal: Optimal Comfort and Wellbeing  Outcome: Progressing     Problem: Infection  Goal: Absence of Infection Signs and Symptoms  Outcome: Progressing     Problem: Pain Acute  Goal: Optimal Pain Control and Function  Outcome: Progressing  Intervention: Prevent or Manage Pain  Recent Flowsheet Documentation  Taken 3/23/2024 0900 by Trupti Ceron RN  Bowel Elimination Promotion: ambulation promoted  Medication Review/Management: medications reviewed  Intervention: Optimize Psychosocial Wellbeing  Recent Flowsheet Documentation  Taken 3/23/2024 0900 by Trupti Ceron RN  Supportive Measures: active listening utilized   Goal Outcome Evaluation:       Patient is alert and orientated times 4, independent in room, gait steady, denies pain. Drains times 2 intact. Left drain leaks at insertion site when flushed. IV antibiotics as ordered. Appetite good.

## 2024-03-23 NOTE — PLAN OF CARE
"  Problem: Adult Inpatient Plan of Care  Goal: Plan of Care Review  Description: The Plan of Care Review/Shift note should be completed every shift.  The Outcome Evaluation is a brief statement about your assessment that the patient is improving, declining, or no change.  This information will be displayed automatically on your shift  note.  Outcome: Progressing  Goal: Patient-Specific Goal (Individualized)  Description: You can add care plan individualizations to a care plan. Examples of Individualization might be:  \"Parent requests to be called daily at 9am for status\", \"I have a hard time hearing out of my right ear\", or \"Do not touch me to wake me up as it startles  me\".  Outcome: Progressing  Goal: Absence of Hospital-Acquired Illness or Injury  Outcome: Progressing  Intervention: Identify and Manage Fall Risk  Recent Flowsheet Documentation  Taken 3/23/2024 0100 by Michelle Medrano RN  Safety Promotion/Fall Prevention:   increased rounding and observation   increase visualization of patient   nonskid shoes/slippers when out of bed  Intervention: Prevent Skin Injury  Recent Flowsheet Documentation  Taken 3/23/2024 0100 by Michelle Medrano RN  Body Position: position changed independently  Intervention: Prevent Infection  Recent Flowsheet Documentation  Taken 3/23/2024 0100 by Michelle Medrano RN  Infection Prevention:   hand hygiene promoted   rest/sleep promoted   single patient room provided  Goal: Optimal Comfort and Wellbeing  Outcome: Progressing  Goal: Readiness for Transition of Care  Outcome: Progressing     Problem: Pain Acute  Goal: Optimal Pain Control and Function  Outcome: Progressing  Intervention: Prevent or Manage Pain  Recent Flowsheet Documentation  Taken 3/23/2024 0100 by Michelle Medrano RN  Medication Review/Management: medications reviewed     Problem: Comorbidity Management  Goal: Blood Glucose Levels Within Targeted Range  Outcome: Progressing  Intervention: Monitor and Manage Glycemia  Recent " Flowsheet Documentation  Taken 3/23/2024 0100 by Michelle Medrano RN  Medication Review/Management: medications reviewed     Problem: Infection  Goal: Absence of Infection Signs and Symptoms  Outcome: Progressing   Goal Outcome Evaluation:  Pt slept in chair overnight, awake with cares.  Pt denied pain. Left drain irrigated and leaking occurred, drsg changed. Right drain irrigated and dressing is C/D/I. Iv antibiotics are running at this time. Pt independent with bathroom and is alert and able to make his needs known.

## 2024-03-24 LAB — BACTERIA ABSC ANAEROBE+AEROBE CULT: NO GROWTH

## 2024-03-25 ENCOUNTER — OFFICE VISIT (OUTPATIENT)
Dept: SURGERY | Facility: CLINIC | Age: 24
End: 2024-03-25
Payer: MEDICAID

## 2024-03-25 ENCOUNTER — LAB (OUTPATIENT)
Dept: LAB | Facility: CLINIC | Age: 24
End: 2024-03-25
Payer: MEDICAID

## 2024-03-25 VITALS
DIASTOLIC BLOOD PRESSURE: 70 MMHG | SYSTOLIC BLOOD PRESSURE: 126 MMHG | HEIGHT: 67 IN | BODY MASS INDEX: 46.33 KG/M2 | WEIGHT: 295.2 LBS

## 2024-03-25 DIAGNOSIS — E66.01 OBESITY, CLASS III, BMI 40-49.9 (MORBID OBESITY) (H): ICD-10-CM

## 2024-03-25 DIAGNOSIS — E11.9 TYPE 2 DIABETES MELLITUS WITHOUT COMPLICATION, WITHOUT LONG-TERM CURRENT USE OF INSULIN (H): ICD-10-CM

## 2024-03-25 DIAGNOSIS — E66.01 OBESITY, CLASS III, BMI 40-49.9 (MORBID OBESITY) (H): Primary | ICD-10-CM

## 2024-03-25 DIAGNOSIS — E66.01 MORBID OBESITY WITH BMI OF 45.0-49.9, ADULT (H): ICD-10-CM

## 2024-03-25 LAB
ALBUMIN SERPL BCG-MCNC: 4.4 G/DL (ref 3.5–5.2)
ALP SERPL-CCNC: 63 U/L (ref 40–150)
ALT SERPL W P-5'-P-CCNC: 92 U/L (ref 0–70)
ANION GAP SERPL CALCULATED.3IONS-SCNC: 13 MMOL/L (ref 7–15)
AST SERPL W P-5'-P-CCNC: 60 U/L (ref 0–45)
BILIRUB SERPL-MCNC: 0.3 MG/DL
BUN SERPL-MCNC: 12.7 MG/DL (ref 6–20)
CALCIUM SERPL-MCNC: 9.7 MG/DL (ref 8.6–10)
CALPROTECTIN STL-MCNT: 114 MG/KG (ref 0–49.9)
CHLORIDE SERPL-SCNC: 104 MMOL/L (ref 98–107)
CHOLEST SERPL-MCNC: 201 MG/DL
CREAT SERPL-MCNC: 0.62 MG/DL (ref 0.67–1.17)
DEPRECATED HCO3 PLAS-SCNC: 23 MMOL/L (ref 22–29)
EGFRCR SERPLBLD CKD-EPI 2021: >90 ML/MIN/1.73M2
FASTING STATUS PATIENT QL REPORTED: NO
FERRITIN SERPL-MCNC: 959 NG/ML (ref 31–409)
FOLATE SERPL-MCNC: 6.1 NG/ML (ref 4.6–34.8)
GLUCOSE SERPL-MCNC: 108 MG/DL (ref 70–99)
HBA1C MFR BLD: 8.3 % (ref 0–5.6)
HDLC SERPL-MCNC: 30 MG/DL
LDLC SERPL CALC-MCNC: 123 MG/DL
NONHDLC SERPL-MCNC: 171 MG/DL
POTASSIUM SERPL-SCNC: 4.2 MMOL/L (ref 3.4–5.3)
PROT SERPL-MCNC: 8 G/DL (ref 6.4–8.3)
PTH-INTACT SERPL-MCNC: 36 PG/ML (ref 15–65)
SODIUM SERPL-SCNC: 140 MMOL/L (ref 135–145)
TRIGL SERPL-MCNC: 239 MG/DL
TSH SERPL DL<=0.005 MIU/L-ACNC: 2.32 UIU/ML (ref 0.3–4.2)
VIT B12 SERPL-MCNC: 848 PG/ML (ref 232–1245)

## 2024-03-25 PROCEDURE — 99204 OFFICE O/P NEW MOD 45 MIN: CPT | Performed by: NURSE PRACTITIONER

## 2024-03-25 PROCEDURE — 82607 VITAMIN B-12: CPT

## 2024-03-25 PROCEDURE — 84590 ASSAY OF VITAMIN A: CPT | Mod: 90

## 2024-03-25 PROCEDURE — 83970 ASSAY OF PARATHORMONE: CPT

## 2024-03-25 PROCEDURE — 80061 LIPID PANEL: CPT

## 2024-03-25 PROCEDURE — 84425 ASSAY OF VITAMIN B-1: CPT | Mod: 90

## 2024-03-25 PROCEDURE — 84443 ASSAY THYROID STIM HORMONE: CPT

## 2024-03-25 PROCEDURE — 80053 COMPREHEN METABOLIC PANEL: CPT

## 2024-03-25 PROCEDURE — 82728 ASSAY OF FERRITIN: CPT

## 2024-03-25 PROCEDURE — 82746 ASSAY OF FOLIC ACID SERUM: CPT

## 2024-03-25 PROCEDURE — 99000 SPECIMEN HANDLING OFFICE-LAB: CPT

## 2024-03-25 PROCEDURE — 36415 COLL VENOUS BLD VENIPUNCTURE: CPT

## 2024-03-25 PROCEDURE — 83036 HEMOGLOBIN GLYCOSYLATED A1C: CPT

## 2024-03-25 PROCEDURE — 84630 ASSAY OF ZINC: CPT | Mod: 90

## 2024-03-25 PROCEDURE — 82306 VITAMIN D 25 HYDROXY: CPT

## 2024-03-25 ASSESSMENT — SLEEP AND FATIGUE QUESTIONNAIRES
HOW LIKELY ARE YOU TO NOD OFF OR FALL ASLEEP WHILE SITTING AND READING: SLIGHT CHANCE OF DOZING
HOW LIKELY ARE YOU TO NOD OFF OR FALL ASLEEP WHILE SITTING AND TALKING TO SOMEONE: WOULD NEVER DOZE
HOW LIKELY ARE YOU TO NOD OFF OR FALL ASLEEP IN A CAR, WHILE STOPPED FOR A FEW MINUTES IN TRAFFIC: WOULD NEVER DOZE
HOW LIKELY ARE YOU TO NOD OFF OR FALL ASLEEP WHEN YOU ARE A PASSENGER IN A CAR FOR AN HOUR WITHOUT A BREAK: SLIGHT CHANCE OF DOZING
HOW LIKELY ARE YOU TO NOD OFF OR FALL ASLEEP WHILE LYING DOWN TO REST IN THE AFTERNOON WHEN CIRCUMSTANCES PERMIT: SLIGHT CHANCE OF DOZING
HOW LIKELY ARE YOU TO NOD OFF OR FALL ASLEEP WHILE WATCHING TV: SLIGHT CHANCE OF DOZING
HOW LIKELY ARE YOU TO NOD OFF OR FALL ASLEEP WHILE SITTING QUIETLY AFTER LUNCH WITHOUT ALCOHOL: WOULD NEVER DOZE
HOW LIKELY ARE YOU TO NOD OFF OR FALL ASLEEP WHILE SITTING INACTIVE IN A PUBLIC PLACE: WOULD NEVER DOZE

## 2024-03-25 NOTE — LETTER
"    3/25/2024         RE: Mynor Rollins   [facility] [place] [address]        Dear Colleague,    Thank you for referring your patient, Mynor Rollins, to the [facility] [place]. Please see a copy of my visit note below.        New Medical Weight Management Consult    PATIENT:  Mynor Rollins  MRN:         [id number]  :         2000  CARLOS:         3/25/2024      I had the pleasure of seeing, Mynor Rollins. Full intake/assessment was done to determine barriers to weight loss success and develop a treatment plan. Mynor Rollins is a 23 year old male interested in treatment of medical problems associated with excess weight. He has a height of 5' 7\", a weight of 295 lbs 3.2 oz, and the calculated Body mass index is 46.23 kg/m . He recently was diagnosed with perforated diverticulitis and has had multiple drains placed into abscesses. He is in need of sigmoidectomy after he has recovered fully from the diverticulitis but patient needs to have aggressive weight loss and better diabetes control prior to his needed sigmoidectomy to lower his risk of complication and increase his chances of successful surgery and proper healing post-operatively.    ASSESSMENT/PLAN:  DIET  High protein and low carb with limited eating out; visit planned with dietician  EXERCISE Increase activity and start to count steps aiming for 7000 steps to start each day  PHARMACOTHERAPY: will increase his Ozempic dose from 1 mg weekly to 2 mg weekly to aid with weight loss that will help with his diabetes control    Patient to return in 4 weeks to evaluate process      -We reviewed his medications and whether associated with weight gain.    We discussed [facility] Bariatric Basics including:  -eating 3 meals daily  -eating protein first  -eating slowly, chewing food well  -avoiding/limiting calorie containing beverages  -choosing wheat, not white with breads, crackers, pastas, amy, " bagels, tortillas, rice  -limiting restaurant or cafeteria eating to twice a week or less  -We discussed the importance of restorative sleep and stress management in maintaining a healthy weight.  -We discussed insulin resistance and glycemic index as it relates to appetite and weight control  -We discussed the National Weight Control Registry healthy weight maintenance strategies and ways to optimize metabolism.  -We discussed the importance of physical activity including cardiovascular and strength training in maintaining a healthier weight and explored viable options.      He has the following co-morbidities:        3/25/2024    11:00 AM   --   I have the following health issues associated with obesity Type II Diabetes    Asthma            No data to display                     No data to display                     No data to display                     No data to display                     No data to display                     No data to display                     No data to display              HPI: Mynor Rollins is a 23 year old male with PMH significant for DM2, diverticulitis of colon w/ perforation and abscess formation, and obesity class 2. Patient presents to clinic to discuss weight management in preparation for colon resection. Patient currently has abdominal drains that are managed by IR but will need sigmoidectomy after he recovers from perforated diverticulitis. Given his current body habitus and diabetes, patient is at high risk for anastomotic breakdown and impaired wound healing. His current weight also makes surgery much more difficult and dangerous. He understands that if he ultimately requires a colostomy, his body habitus also creates challenges for the survival of the stoma. For all these reasons, he present to clinic today to get help with his weight in the hopes that this will also improve his diabetes control.     Patient reports that he was always a big kid, but truly became obese  as a teenager. His highest weight was 320 pounds and the most he has ever lost was 30 pounds when attempting to lose weight. This was accomplished by counting his calories and increasing his activity. He has never worked with a structured weight loss program or used weight loss medications. He is not interested in bariatric surgery at this time. He remains fairly active but only exercises 1-2 times per week for 20 minutes. He does not count his steps or formerly track his activity. He does have chronic aches or pains but nothing that requires medical intervention usually. He does not snore and reports that he has not been told he stops breathing by any of his family.     He reports that he usually eats eggs and rice for breakfast, sandwiches for lunch, and meat vegetables and rice for dinner. He tends to snack on chips between meals and only drinks about 32 ounces of water a day. He drinks 8-16 ounces of milk (2%) or juice daily and eats out at fast food restaurants at least once a week. He prefers burgers or pizza when eating out.     THESE QUESTIONS ASK ABOUT PAST HEALTH CONCERNS    Do you have a history of DVT/ blood clot in legs requiring therapy? No.   Are you currently taking any anticoagulation medication (blood thinners)? No.   Do you have a history of pulmonary embolism/ blood clot in your lung? No.   Have you ever had a heart attack? No.   Have you ever had a cardiac stent placed in your heart? No.   Have you ever had any cardiac surgery other than a stent placement (bypass, ablation etc.)? No.   THESE QUESTIONS ASK ABOUT CURRENT HEALTH CONCERNS RELATED TO YOUR WEIGHT    Do you use oxygen? No   Are you currently requiring or on dialysis? No   Do you take steroids or immunosuppressants for a chronic condition? No.   Do you have any of the following medical conditions?    Review of Symptoms: Please check the following symptoms you have experienced within the last 30 days    Skin: Skin fold rashes (groin or  "other folds)   HEENT: None of these   Musculoskeletal: Back pain   Cardiovascular: Shortness of breath with activity   Pulmonary: Shortness of breath with activity    Snoring   Gastrointestinal: Diarrhea   Genitourinary: None of the above   Hematological: None of the above   Neurological: None of the above       PAST MEDICAL HISTORY:  Past Medical History:   Diagnosis Date     Uncomplicated asthma 2/26/06    Went away as i got older   Diverticulitis w/ perforation and abscess  DM2  Obesity class 3 with BMI >45         No data to display                     No data to display                     No data to display                MEDICATIONS:   Current Outpatient Medications   Medication Sig Dispense Refill     amoxicillin-clavulanate (AUGMENTIN) 875-125 MG tablet Take 1 tablet by mouth 2 times daily for 10 days 20 tablet 0     blood glucose (NO BRAND SPECIFIED) lancets standard Use to test blood sugar 4 times daily or as directed. 200 each 0     blood glucose (NO BRAND SPECIFIED) test strip Use to test blood sugar 4 times daily or as directed. 200 strip 1     blood glucose monitoring (NO BRAND SPECIFIED) meter device kit Use to test blood sugar 4 times daily or as directed. 1 kit 0     insulin pen needle (32G X 4 MM) 32G X 4 MM miscellaneous Use 5 pen needles daily or as directed. (Patient taking differently: Use 1 pen needle weekly or as directed.) 200 each 1     OZEMPIC, 1 MG/DOSE, 4 MG/3ML pen Inject 1 mg Subcutaneous every 7 days On Monday.       Semaglutide, 2 MG/DOSE, (OZEMPIC) 8 MG/3ML pen Inject 2 mg Subcutaneous every 7 days 9 mL 3     sodium chloride, PF, 0.9% PF flush Irrigate with 10 mLs as directed every 24 hours for 60 days Flush each drain with 10ml daily. 300 mL 1       ALLERGIES:   No Known Allergies    PHYSICAL EXAM:  /70 (BP Location: Right arm, Patient Position: Sitting, Cuff Size: Adult Small)   Ht 1.702 m (5' 7\")   Wt 133.9 kg (295 lb 3.2 oz)   BMI 46.23 kg/m      Waist circumference: " 56 cm    Wt Readings from Last 4 Encounters:   03/25/24 133.9 kg (295 lb 3.2 oz)   03/08/24 139 kg (306 lb 7 oz)   06/20/20 134.4 kg (296 lb 4.8 oz) (>99%, Z= 3.03)*     * Growth percentiles are based on [facility] (Boys, 2-20 Years) data.     A & O x 3  HEENT: NCAT, mucous membranes moist  Respirations unlabored  Location of obesity: Mixed Obesity  General Appearance  No acute distress. Obesity   Alert and Oriented   Ambulatory without a device  HEENT  PERRLA, EOMI; Melampati Score III  Neck  Stout; No carotid bruits or JVD  Cardiovascular  Regular rate and rhythm  No murmur or gallop  Pulmonary  LS CTA bilaterally  Abdomen  Soft, NT/ND, No rebound, no guarding; drain x2 present (right drain scant serous; left drain purulent drainage)  No rashes  Extremities:  Pitting edema: Not present  Distal pulses palpable  Neurologic  Cranial nerves grossly intact; no tremors present  Psychiatric  Thought content organized  Mood appears stable  Endocrine  Sunshine Facies not present  Dorsal Thoracic Prominence not present  Skin tags not present  Acanthosis nigricans not present  Purple Striae not present  Dermatologic  Intertrigo not present  Hirsutism not present      FOLLOW-UP:   4 weeks.    TIME: 50 min spent on evaluation, management, counseling, education, & motivational interviewing with greater than 50 % of the total time was spent on counseling and coordinating care    Sincerely,    DEBORAH Ying CNP            Again, thank you for allowing me to participate in the care of your patient.        Sincerely,        DEBORAH Ying CNP

## 2024-03-25 NOTE — PROGRESS NOTES
"    New Medical Weight Management Consult    PATIENT:  Mynor Rollins  MRN:         [id number]  :         2000  CARLOS:         3/25/2024      I had the pleasure of seeing, Mynor Rollins. Full intake/assessment was done to determine barriers to weight loss success and develop a treatment plan. Mynor Rollins is a 23 year old male interested in treatment of medical problems associated with excess weight. He has a height of 5' 7\", a weight of 295 lbs 3.2 oz, and the calculated Body mass index is 46.23 kg/m . He recently was diagnosed with perforated diverticulitis and has had multiple drains placed into abscesses. He is in need of sigmoidectomy after he has recovered fully from the diverticulitis but patient needs to have aggressive weight loss and better diabetes control prior to his needed sigmoidectomy to lower his risk of complication and increase his chances of successful surgery and proper healing post-operatively.    ASSESSMENT/PLAN:  DIET  High protein and low carb with limited eating out; visit planned with dietician  EXERCISE Increase activity and start to count steps aiming for 7000 steps to start each day  PHARMACOTHERAPY: will increase his Ozempic dose from 1 mg weekly to 2 mg weekly to aid with weight loss that will help with his diabetes control    Patient to return in 4 weeks to evaluate process      -We reviewed his medications and whether associated with weight gain.    We discussed [facility] Bariatric Basics including:  -eating 3 meals daily  -eating protein first  -eating slowly, chewing food well  -avoiding/limiting calorie containing beverages  -choosing wheat, not white with breads, crackers, pastas, amy, bagels, tortillas, rice  -limiting restaurant or cafeteria eating to twice a week or less  -We discussed the importance of restorative sleep and stress management in maintaining a healthy weight.  -We discussed insulin resistance and glycemic index as it relates to appetite and " weight control  -We discussed the National Weight Control Registry healthy weight maintenance strategies and ways to optimize metabolism.  -We discussed the importance of physical activity including cardiovascular and strength training in maintaining a healthier weight and explored viable options.      He has the following co-morbidities:        3/25/2024    11:00 AM   --   I have the following health issues associated with obesity Type II Diabetes    Asthma            No data to display                     No data to display                     No data to display                     No data to display                     No data to display                     No data to display                     No data to display              HPI: Mynor Rollins is a 23 year old male with PMH significant for DM2, diverticulitis of colon w/ perforation and abscess formation, and obesity class 2. Patient presents to clinic to discuss weight management in preparation for colon resection. Patient currently has abdominal drains that are managed by IR but will need sigmoidectomy after he recovers from perforated diverticulitis. Given his current body habitus and diabetes, patient is at high risk for anastomotic breakdown and impaired wound healing. His current weight also makes surgery much more difficult and dangerous. He understands that if he ultimately requires a colostomy, his body habitus also creates challenges for the survival of the stoma. For all these reasons, he present to clinic today to get help with his weight in the hopes that this will also improve his diabetes control.     Patient reports that he was always a big kid, but truly became obese as a teenager. His highest weight was 320 pounds and the most he has ever lost was 30 pounds when attempting to lose weight. This was accomplished by counting his calories and increasing his activity. He has never worked with a structured weight loss program or used weight loss  medications. He is not interested in bariatric surgery at this time. He remains fairly active but only exercises 1-2 times per week for 20 minutes. He does not count his steps or formerly track his activity. He does have chronic aches or pains but nothing that requires medical intervention usually. He does not snore and reports that he has not been told he stops breathing by any of his family.     He reports that he usually eats eggs and rice for breakfast, sandwiches for lunch, and meat vegetables and rice for dinner. He tends to snack on chips between meals and only drinks about 32 ounces of water a day. He drinks 8-16 ounces of milk (2%) or juice daily and eats out at fast food restaurants at least once a week. He prefers burgers or pizza when eating out.     THESE QUESTIONS ASK ABOUT PAST HEALTH CONCERNS    Do you have a history of DVT/ blood clot in legs requiring therapy? No.   Are you currently taking any anticoagulation medication (blood thinners)? No.   Do you have a history of pulmonary embolism/ blood clot in your lung? No.   Have you ever had a heart attack? No.   Have you ever had a cardiac stent placed in your heart? No.   Have you ever had any cardiac surgery other than a stent placement (bypass, ablation etc.)? No.   THESE QUESTIONS ASK ABOUT CURRENT HEALTH CONCERNS RELATED TO YOUR WEIGHT    Do you use oxygen? No   Are you currently requiring or on dialysis? No   Do you take steroids or immunosuppressants for a chronic condition? No.   Do you have any of the following medical conditions?    Review of Symptoms: Please check the following symptoms you have experienced within the last 30 days    Skin: Skin fold rashes (groin or other folds)   HEENT: None of these   Musculoskeletal: Back pain   Cardiovascular: Shortness of breath with activity   Pulmonary: Shortness of breath with activity    Snoring   Gastrointestinal: Diarrhea   Genitourinary: None of the above   Hematological: None of the above  "  Neurological: None of the above       PAST MEDICAL HISTORY:  Past Medical History:   Diagnosis Date    Uncomplicated asthma 2/26/06    Went away as i got older   Diverticulitis w/ perforation and abscess  DM2  Obesity class 3 with BMI >45         No data to display                     No data to display                     No data to display                MEDICATIONS:   Current Outpatient Medications   Medication Sig Dispense Refill    amoxicillin-clavulanate (AUGMENTIN) 875-125 MG tablet Take 1 tablet by mouth 2 times daily for 10 days 20 tablet 0    blood glucose (NO BRAND SPECIFIED) lancets standard Use to test blood sugar 4 times daily or as directed. 200 each 0    blood glucose (NO BRAND SPECIFIED) test strip Use to test blood sugar 4 times daily or as directed. 200 strip 1    blood glucose monitoring (NO BRAND SPECIFIED) meter device kit Use to test blood sugar 4 times daily or as directed. 1 kit 0    insulin pen needle (32G X 4 MM) 32G X 4 MM miscellaneous Use 5 pen needles daily or as directed. (Patient taking differently: Use 1 pen needle weekly or as directed.) 200 each 1    OZEMPIC, 1 MG/DOSE, 4 MG/3ML pen Inject 1 mg Subcutaneous every 7 days On Monday.      Semaglutide, 2 MG/DOSE, (OZEMPIC) 8 MG/3ML pen Inject 2 mg Subcutaneous every 7 days 9 mL 3    sodium chloride, PF, 0.9% PF flush Irrigate with 10 mLs as directed every 24 hours for 60 days Flush each drain with 10ml daily. 300 mL 1       ALLERGIES:   No Known Allergies    PHYSICAL EXAM:  /70 (BP Location: Right arm, Patient Position: Sitting, Cuff Size: Adult Small)   Ht 1.702 m (5' 7\")   Wt 133.9 kg (295 lb 3.2 oz)   BMI 46.23 kg/m      Waist circumference: 56 cm    Wt Readings from Last 4 Encounters:   03/25/24 133.9 kg (295 lb 3.2 oz)   03/08/24 139 kg (306 lb 7 oz)   06/20/20 134.4 kg (296 lb 4.8 oz) (>99%, Z= 3.03)*     * Growth percentiles are based on CDC (Boys, 2-20 Years) data.     A & O x 3  HEENT: NCAT, mucous membranes " moist  Respirations unlabored  Location of obesity: Mixed Obesity  General Appearance  No acute distress. Obesity   Alert and Oriented   Ambulatory without a device  HEENT  PERRLA, EOMI; Melampati Score III  Neck  Stout; No carotid bruits or JVD  Cardiovascular  Regular rate and rhythm  No murmur or gallop  Pulmonary  LS CTA bilaterally  Abdomen  Soft, NT/ND, No rebound, no guarding; drain x2 present (right drain scant serous; left drain purulent drainage)  No rashes  Extremities:  Pitting edema: Not present  Distal pulses palpable  Neurologic  Cranial nerves grossly intact; no tremors present  Psychiatric  Thought content organized  Mood appears stable  Endocrine  Sunshine Facies not present  Dorsal Thoracic Prominence not present  Skin tags not present  Acanthosis nigricans not present  Purple Striae not present  Dermatologic  Intertrigo not present  Hirsutism not present      FOLLOW-UP:   4 weeks.    TIME: 50 min spent on evaluation, management, counseling, education, & motivational interviewing with greater than 50 % of the total time was spent on counseling and coordinating care    Sincerely,    DEBORAH Ying CNP

## 2024-03-26 ENCOUNTER — TELEPHONE (OUTPATIENT)
Dept: INTERVENTIONAL RADIOLOGY/VASCULAR | Facility: HOSPITAL | Age: 24
End: 2024-03-26
Payer: MEDICAID

## 2024-03-26 LAB
BACTERIA ABSC ANAEROBE+AEROBE CULT: NORMAL
DEPRECATED CALCIDIOL+CALCIFEROL SERPL-MC: <11 UG/L (ref 20–75)
VITAMIN D2 SERPL-MCNC: <5 UG/L
VITAMIN D3 SERPL-MCNC: 6 UG/L
ZINC SERPL-MCNC: 107.2 UG/DL

## 2024-03-26 NOTE — PROGRESS NOTES
Interventional Radiology Pre-Procedure Assessment  Outpatient - [facility] - 03/28/24    Procedure requested: Abscessogram  Requesting provider: ROLAND Ramirez NP    Brief HPI  Mynor Rollins is a 23 year old male with a pertinent past medical history of asthma and diabetes mellitus type II who was admitted to [facility] from 3/18 - 3/23/2024 for perforated diverticulitis with left lower quadrant fluid collection s/p drainage catheter placement on 3/13 and central pelvic fluid collection s/p drainage catheter placement on 3/19. He presents today for initial abscessograms.    Left lower quadrant fluid collection culture grew Clostridium perfringens, Enterococcus faecalis, Enterococcus durans and Escherichia coli.  Central pelvic fluid collection culture without growth.  Discharged home on Augmentin.    NPO since Midnight  ANTICOAGULANT(S): None    IMAGING  3/13/2024 PERCUTANEOUS DRAIN PLACEMENT LEFT ABDOMINAL ABSCESS   PROCEDURE: Informed consent obtained. Site marked. Prior images reviewed. Required items made available. Patient identity confirmed verbally and with arm band. Patient reevaluated immediately before administering sedation. Universal protocol was followed. Time out performed. The site was prepped and draped in sterile fashion. 10 mL of 1% lidocaine was infused into the local soft tissues. Using standard technique and under direct CT guidance, a 12 Latvian drainage catheter was inserted into the fluid collection.       SPECIMEN: 100 mL of brown purulent fluid was aspirated and sent to lab for cultures and Gram stain.                                                           IMPRESSION:  Successful CT-guided drain placement into left lower quadrant abscess.    3/19/2024 PERCUTANEOUS DRAIN PLACEMENT PELVIC FLUID COLLECTION  PROCEDURE: Informed consent obtained. Site marked. Prior images reviewed. Required items made available. Patient identity  confirmed verbally and with arm band. Patient reevaluated immediately before administering sedation. Universal protocol was followed. Time out performed. The site was prepped and draped in sterile fashion. 16 mL of 1% lidocaine was infused into the local soft tissues. Using standard technique and under direct CT guidance, a 10 Bengali drainage catheter was inserted into the fluid collection.       SPECIMEN: 90 mL of serous fluid was drained during the procedure. Specimens were sent to lab for cultures and Gram stain.                                             IMPRESSION:  Successful CT-guided drain placement into central pelvis.    ALLERGIES  No Known Allergies    LABORATORY VALUES  INR   Date Value Ref Range Status   03/13/2024 1.20 (H) 0.85 - 1.15 Final      Hemoglobin   Date Value Ref Range Status   03/23/2024 13.3 13.3 - 17.7 g/dL Final   06/18/2020 15.9 13.3 - 17.7 g/dL Final     Platelet Count   Date Value Ref Range Status   03/23/2024 557 (H) 150 - 450 10e3/uL Final   06/20/2020 196 150 - 450 10e9/L Final     Creatinine   Date Value Ref Range Status   03/25/2024 0.62 (L) 0.67 - 1.17 mg/dL Final   06/20/2020 0.57 0.50 - 1.00 mg/dL Final     Potassium   Date Value Ref Range Status   03/25/2024 4.2 3.4 - 5.3 mmol/L Final   06/20/2020 3.6 3.4 - 5.3 mmol/L Final     EXAM  /68   Pulse (!) 127   Temp 98.2  F (36.8  C) (Oral)   Resp 18   SpO2 96%   General: No apparent acute distress.  Respiratory: Normal depth and regular rhythm. Clear and equal throughout without adventitious sounds.  Cardiovascular: S1 & S2; regular rate and rhythm.  Integumentary:  Left lower quadrant drainage catheter to bulb suction with purulent drainage seen within collection bulb and tubing. Dressing pealing away from skin. Site soft and flat without active leaking. Tender.  Pelvic drainage catheter to bulb suction with clear, pink-tinged drainage seen within collection bulb and tubing. Dressing pealing away from the skin. Site  soft and flat without active leaking. Non-tender.  Neurological: Alert and oriented. Thoughts coherent; speech clear and logical.  Psychiatric: Appropriate mood and affect.    Code Status: FULL CODE    ASSESSMENT  23 year old male recently admitted for perforated diverticulitis with fluid collections s/p drainage catheter placements.    Denies fever, chills, nausea, vomiting, pain and/or leaking around pelvic drainage catheter. Reports leaking around left lower quadrant drainage catheter with flushing.  Flushing each catheter with 10 cc NS once a day.  Reports approximately 10 cc output/day to LLQ drain.  Reports approximately 6-7 cc output/day to pelvic drain.    PLAN  Perforated diverticulitis  Left lower quadrant fluid collection  Central pelvic fluid collection   - Abscessograms with possible intervention and sedation as needed.  - Procedural education reviewed with patient in detail including but not limited to risks, benefits and alternatives. Patient verbalized understanding.      Total time spent on the date of the encounter is 40 minutes.    DEBORAH Smith, CNP  Interventional Radiology  Pager Number: [phone]

## 2024-03-27 LAB
ANNOTATION COMMENT IMP: NORMAL
RETINYL PALMITATE SERPL-MCNC: <0.02 MG/L
VIT A SERPL-MCNC: 0.7 MG/L

## 2024-03-28 ENCOUNTER — HOSPITAL ENCOUNTER (OUTPATIENT)
Dept: CT IMAGING | Facility: HOSPITAL | Age: 24
Discharge: HOME | End: 2024-03-28
Attending: NURSE PRACTITIONER
Payer: MEDICAID

## 2024-03-28 ENCOUNTER — HOSPITAL ENCOUNTER (OUTPATIENT)
Dept: INTERVENTIONAL RADIOLOGY/VASCULAR | Facility: HOSPITAL | Age: 24
Discharge: HOME | End: 2024-03-28
Attending: NURSE PRACTITIONER
Payer: MEDICAID

## 2024-03-28 VITALS
SYSTOLIC BLOOD PRESSURE: 115 MMHG | RESPIRATION RATE: 29 BRPM | TEMPERATURE: 98.5 F | DIASTOLIC BLOOD PRESSURE: 65 MMHG | OXYGEN SATURATION: 95 % | HEART RATE: 115 BPM

## 2024-03-28 DIAGNOSIS — K57.20 DIVERTICULITIS OF LARGE INTESTINE WITH ABSCESS WITHOUT BLEEDING: ICD-10-CM

## 2024-03-28 LAB — VIT B1 PYROPHOSHATE BLD-SCNC: 108 NMOL/L

## 2024-03-28 PROCEDURE — 255N000002 HC RX 255 OP 636: Performed by: RADIOLOGY

## 2024-03-28 PROCEDURE — 99152 MOD SED SAME PHYS/QHP 5/>YRS: CPT

## 2024-03-28 PROCEDURE — 272N000116 HC CATH CR1

## 2024-03-28 PROCEDURE — 75984 XRAY CONTROL CATHETER CHANGE: CPT | Mod: XS

## 2024-03-28 PROCEDURE — 250N000009 HC RX 250: Performed by: RADIOLOGY

## 2024-03-28 PROCEDURE — C1769 GUIDE WIRE: HCPCS

## 2024-03-28 PROCEDURE — C1729 CATH, DRAINAGE: HCPCS

## 2024-03-28 PROCEDURE — 49423 EXCHANGE DRAINAGE CATHETER: CPT | Mod: XS

## 2024-03-28 PROCEDURE — 250N000011 HC RX IP 250 OP 636: Performed by: NURSE PRACTITIONER

## 2024-03-28 PROCEDURE — 74177 CT ABD & PELVIS W/CONTRAST: CPT

## 2024-03-28 PROCEDURE — 49424 ASSESS CYST CONTRAST INJECT: CPT

## 2024-03-28 RX ORDER — NALOXONE HYDROCHLORIDE 0.4 MG/ML
0.2 INJECTION, SOLUTION INTRAMUSCULAR; INTRAVENOUS; SUBCUTANEOUS
Status: DISCONTINUED | OUTPATIENT
Start: 2024-03-28 | End: 2024-03-29 | Stop reason: HOSPADM

## 2024-03-28 RX ORDER — FENTANYL CITRATE 50 UG/ML
25-50 INJECTION, SOLUTION INTRAMUSCULAR; INTRAVENOUS EVERY 5 MIN PRN
Status: DISCONTINUED | OUTPATIENT
Start: 2024-03-28 | End: 2024-03-29 | Stop reason: HOSPADM

## 2024-03-28 RX ORDER — IOPAMIDOL 755 MG/ML
90 INJECTION, SOLUTION INTRAVASCULAR ONCE
Status: COMPLETED | OUTPATIENT
Start: 2024-03-28 | End: 2024-03-28

## 2024-03-28 RX ORDER — NALOXONE HYDROCHLORIDE 0.4 MG/ML
0.4 INJECTION, SOLUTION INTRAMUSCULAR; INTRAVENOUS; SUBCUTANEOUS
Status: DISCONTINUED | OUTPATIENT
Start: 2024-03-28 | End: 2024-03-29 | Stop reason: HOSPADM

## 2024-03-28 RX ORDER — LIDOCAINE 40 MG/G
CREAM TOPICAL
Status: DISCONTINUED | OUTPATIENT
Start: 2024-03-28 | End: 2024-03-29 | Stop reason: HOSPADM

## 2024-03-28 RX ORDER — FLUMAZENIL 0.1 MG/ML
0.2 INJECTION, SOLUTION INTRAVENOUS
Status: DISCONTINUED | OUTPATIENT
Start: 2024-03-28 | End: 2024-03-29 | Stop reason: HOSPADM

## 2024-03-28 RX ORDER — ONDANSETRON 2 MG/ML
4 INJECTION INTRAMUSCULAR; INTRAVENOUS
Status: DISCONTINUED | OUTPATIENT
Start: 2024-03-28 | End: 2024-03-29 | Stop reason: HOSPADM

## 2024-03-28 RX ADMIN — FENTANYL CITRATE 50 MCG: 50 INJECTION, SOLUTION INTRAMUSCULAR; INTRAVENOUS at 13:23

## 2024-03-28 RX ADMIN — IOPAMIDOL 90 ML: 755 INJECTION, SOLUTION INTRAVENOUS at 12:05

## 2024-03-28 RX ADMIN — FENTANYL CITRATE 50 MCG: 50 INJECTION, SOLUTION INTRAMUSCULAR; INTRAVENOUS at 13:04

## 2024-03-28 RX ADMIN — FENTANYL CITRATE 50 MCG: 50 INJECTION, SOLUTION INTRAMUSCULAR; INTRAVENOUS at 13:26

## 2024-03-28 RX ADMIN — MIDAZOLAM HYDROCHLORIDE 1 MG: 1 INJECTION, SOLUTION INTRAMUSCULAR; INTRAVENOUS at 13:11

## 2024-03-28 RX ADMIN — MIDAZOLAM HYDROCHLORIDE 0.5 MG: 1 INJECTION, SOLUTION INTRAMUSCULAR; INTRAVENOUS at 13:28

## 2024-03-28 RX ADMIN — MIDAZOLAM HYDROCHLORIDE 1 MG: 1 INJECTION, SOLUTION INTRAMUSCULAR; INTRAVENOUS at 13:20

## 2024-03-28 RX ADMIN — FENTANYL CITRATE 50 MCG: 50 INJECTION, SOLUTION INTRAMUSCULAR; INTRAVENOUS at 13:11

## 2024-03-28 RX ADMIN — IOHEXOL 20 ML: 350 INJECTION, SOLUTION INTRAVENOUS at 13:45

## 2024-03-28 RX ADMIN — FENTANYL CITRATE 50 MCG: 50 INJECTION, SOLUTION INTRAMUSCULAR; INTRAVENOUS at 13:14

## 2024-03-28 RX ADMIN — LIDOCAINE HYDROCHLORIDE 15 ML: 10 INJECTION, SOLUTION INFILTRATION; PERINEURAL at 13:12

## 2024-03-28 RX ADMIN — MIDAZOLAM HYDROCHLORIDE 2 MG: 1 INJECTION, SOLUTION INTRAMUSCULAR; INTRAVENOUS at 13:04

## 2024-03-28 NOTE — PROCEDURES
INTERVENTIONAL RADIOLOGY    Procedure:  Abscessogram and drain replacement X 2    Meds:  Versed 4.5 mg IV   Fentanyl 250 mcg IV    MD:  Beatris    Complications:  None    Findings:  LLQ and midline pelvic drains not within respective abscess. Minimal commuication to adjacent abscesses. Both drains were able to be salvaged with new 12 Fr LLQ and 10 Fr midline pelvic drain placement. Fistula to sigmoid colon.    Plan:  Both drains to SURENDRA until abscesses well-drained. F/U CT and abscessogram next week.

## 2024-03-28 NOTE — PRE-PROCEDURE
GENERAL PRE-PROCEDURE:   Procedure:  Abscessograms  Date/Time:  3/28/2024 12:39 PM    Written consent obtained?: Yes    Risks and benefits: Risks, benefits and alternatives were discussed    Consent given by:  Patient  Patient states understanding of procedure being performed: Yes    Patient's understanding of procedure matches consent: Yes    Procedure consent matches procedure scheduled: Yes    Expected level of sedation:  Moderate  Appropriately NPO:  Yes  ASA Class:  3  Mallampati  :  Grade 2- soft palate, base of uvula, tonsillar pillars, and portion of posterior pharyngeal wall visible  Lungs:  Lungs clear with good breath sounds bilaterally  Heart:  Normal heart sounds and rate  History & Physical reviewed:  History and physical reviewed and no updates needed  Statement of review:  I have reviewed the lab findings, diagnostic data, medications, and the plan for sedation

## 2024-03-28 NOTE — PROVIDER NOTIFICATION
Pt arrives for tube check. He has been flushing the right tube, 10cc daily, outputs less than 5cc daily.

## 2024-03-28 NOTE — PROVIDER NOTIFICATION
Pt has been flushing the left drain with 10 ml daily, as he is flushing he has drainage around the tube. Outputs have been about 10cc daily.

## 2024-03-28 NOTE — DISCHARGE INSTRUCTIONS
Drain Check/Exchange/Reposition Discharge Instructions:  Please follow the below instructions after your drain check/exchange/reposition:    Care Instructions after drain placement:  - If you received sedation for your procedure, do not drive or operate heavy machinery for the rest of the day.  - Rest after your drain was placed. Avoid strenuous activity and heavy lifting for the next 2 days. Return to your normal activities as you tolerate after the 2 day restriction.  - You may shower; however, you should not submerge site under water like in a tub bath, Jacuzzi or pool. Cover drain exit site with plastic wrap when showering.  - Keep dressing clean and dry as long as drain is in place. If you have a gauze dressing, please change the dressing as needed to keep site clean and dry.  - You may eat and drink as normal.  - You may have discomfort after the procedure near the drain exit site. You may take acetaminophen (Tylenol ) or ibuprofen (Motrin ) as needed for any discomfort.  - Inspect the tube often for kinks.  - If you have a gravity bag, keep the bag below the drain exit site to allow for free flow of drainage by gravity.  - Flush your drainage tube with 10mL sterile normal saline daily, both drains.  - Record your daily drain outputs and amount flushed on your drainage record. Bring your records to your next radiology appointment.  - Empty your drainage bag/bulb daily or when it is approximately half way fluid. Follow below instructions for emptying your bag/bulb:  - Clean hands well with soap and water.  - Place a measuring container near the outlet valve of the drainage bag/bulb.    - If you have a drainage BULB: Open the bulb cap (at the top of the bulb). Empty the fluid into the measuring cup. Squeeze bulb and hold flat. While bulb is squeezed, close the cap.  - After draining fluid, record your drainage output. Bring this record to your next radiology drain follow up appointment.  - Discard drainage into  toilet once fluid drained.      Follow up: Please contact [facility] Scheduling at [phone]    Flushing Your Drainage Tube:          Y flushing port / UreSil Flush Adapter:    1. Collect flushing supplies: 10mL of sterile normal saline in syringe, alcohol pad.  2. Clamp off the tubing to the drain by pinching the white clamp closed. Clean the drain port with an alcohol wipe.   3. Attach the saline syringe to the drain port.   4. Twist the syringe (clockwise) to tighten it to the port   5. Flush sterile normal saline from the syringe into the drain. The saline should flow toward your body not toward the drainage bag.   6. Untwist the syringe (counter clockwise) and remove it.   7. Unclamp the white clamp making sure the drainage is able to flow freely into the bag.   8. Squeeze bulb or accordion to reapply suction if you have one.  9. Record the output from your drain and flush amount on your drainage record.  ?  Please seek medical evaluation for:  - Nausea and/or vomiting.  - Diffuse abdominal pain.  - Fevers (greater than 101 F (38.3C)).    Call [facility] [phone] at with tube related questions or concerns:  - Drainage tube falls out, is pulled back or felt to be out of position.   - Unable to flush drainage tube.   - Leakage (or purulent drainage) around drainage tube site.   - Extreme pain at drainage tube site.   - Outputs suddenly stop or significantly reduces.   - Warmth, redness, swelling or tenderness around the drainage tube.

## 2024-04-01 DIAGNOSIS — E55.9 VITAMIN D DEFICIENCY: Primary | ICD-10-CM

## 2024-04-01 RX ORDER — ERGOCALCIFEROL 1.25 MG/1
50000 CAPSULE, LIQUID FILLED ORAL WEEKLY
Qty: 12 CAPSULE | Refills: 3 | Status: SHIPPED | OUTPATIENT
Start: 2024-04-01 | End: 2024-05-14

## 2024-04-02 ENCOUNTER — MYC MEDICAL ADVICE (OUTPATIENT)
Dept: INTERVENTIONAL RADIOLOGY/VASCULAR | Facility: CLINIC | Age: 24
End: 2024-04-02
Payer: MEDICAID

## 2024-04-04 RX ORDER — LIDOCAINE 40 MG/G
CREAM TOPICAL
Status: CANCELLED | OUTPATIENT
Start: 2024-04-04

## 2024-04-04 NOTE — PROGRESS NOTES
Interventional Radiology - Pre-Procedure Note:  Outpatient - [facility]  04/08/24      Procedure Requested: abscessogram  Requested by: AUGIE Zuniga NP    History and Physical Reviewed: H&P documented within 30 days (by Dr. Whaley on 3/18/2024).  I have personally reviewed the patient's medical history and have updated the medical record as necessary.    Brief HPI: Mynor Rollins is a 23 year old male with PMHx of of asthma and DM II who was admitted to [facility] from 3/18 - 3/23/2024 for perforated diverticulitis with left lower quadrant fluid collection s/p drainage catheter placement on 3/13 and central pelvic fluid collection s/p drainage catheter placement on 3/19.      Most recent abscessogram 3/28/24 with malpositioned LLQ drain, successful replacement/repositioning of 12F drain and colonic fistula noted. Also with malpositioned midline pelvic drain, successfully replaced/repositioned, no fistula noted.    Presents today for abscessogram with CT prior. Patient reports leakage from left side drain with flushing; otherwise denies leakage. Denies pain, fever/chills, N/V. Reports a lump on the back of his head/neck.     Flushing: 10ml daily to each drain.   Outputs: Right drain 5ml/day and left drain 10-15ml/day.    IMAGING:  Study Result    Narrative & Impression   [facility]  LOCATION: [facility]  DATE: 3/28/2024     PROCEDURE: ABSCESSOGRAM WITH ABSCESS DRAIN CHANGE X2     INTERVENTIONAL RADIOLOGIST: Ari Spear MD     INDICATION: Ruptured diverticulitis complicated by left lower quadrant and deep pelvic abscesses status post drainage. Drains are both malpositioned by CT.     MODERATE SEDATION: Versed 4.5 mg IV; Fentanyl 250 mcg IV. During the time out, immediately prior to the administration of medications, the patient was reassessed for adequacy to receive conscious sedation.  Under physician supervision, Versed  and   fentanyl were administered for moderate sedation. Pulse oximetry, heart rate and blood pressure were continuously monitored by an independent trained observer. The physician spent 30 minutes of face-to-face sedation time with the patient.     CONTRAST: 20 cc Omnipaque 350  FLUOROSCOPIC TIME: 6.9 minutes.  RADIATION DOSE: Air Kerma: 877 mGy.     COMPLICATIONS: No immediate complications.     PROCEDURE:   The midline pelvic abscess drain in left lower quadrant drain sites were both prepped and draped in usual sterile fashion followed by local anesthesia with 1% Xylocaine. The left lower quadrant drain was injected with contrast. Digital spot images were   obtained. The existing drain was removed over stiff Glidewire. A catheter wire used to navigate through the nonmature tract to the residual, enlarging bilobed abscess. A new 12 Trin[place] drain was then placed, positioned within the abscess cavity. Drain was   secured and maintained to SURENDRA suction.     The midline pelvic abscess drain was injected with contrast. Digital spot images were obtained. A wire was advanced into the undrained deep or component of the abscess. The existing drain was then replaced with a new 10 Trinid[place] locking pigtail drain. The   cavity was lavaged and aspirated. The drain was secured and maintained to SURENDRA suction.     FINDINGS:  Left lower quadrant drain: Malposition, outside the confines of the enlarging bilobed left lower quadrant abscess. There is a very narrow, nonmature connection. This is able to be navigated for drain replacement. A new 12 Trinidadian drain was replaced and   positioned within the undrained, enlarging abscess. Fistula to the sigmoid colon noted. Drain maintained to SURENDRA suction for now.     Midline pelvic abscess drain: Drain is malpositioned at the anterior periphery of the residual abscess. Small a moderate size residual abscess cavity containing debris. Successful replacement drain with new 10 Trinidadian drain positioned  "within the abscess   cavity. No fistula at this level noted. Drain maintained to SURENDRA suction.                                                                      IMPRESSION:  1.  Left lower quadrant drain, malpositioned, not within the enlarging abscess cavity. Successful replacement of 12 [place] left lower quadrant drain into the bilobed abscess. Colonic fistula noted. Plan for SURENDRA suction with saline flushes until abscess is   confirmed to be adequately drained. This will then likely need to be converted to gravity drainage.  2.  Midline pelvic abscess drain was malpositioned, successfully replaced and repositioned within the undrained component of the residual central pelvic abscess. No fistula noted at this level. This drain was maintained to SURENDRA suction with saline flushes.  3.  Plan for follow-up CT and abscessogram in 7-10 days.       NPO: midnight  ANTICOAGULANTS: none  ANTIBIOTICS: none indicated for routine procedure    ALLERGIES  No Known Allergies      LABS:  INR   Date Value Ref Range Status   03/13/2024 1.20 (H) 0.85 - 1.15 Final      Hemoglobin   Date Value Ref Range Status   03/23/2024 13.3 13.3 - 17.7 g/dL Final   06/18/2020 15.9 13.3 - 17.7 g/dL Final     Platelet Count   Date Value Ref Range Status   03/23/2024 557 (H) 150 - 450 10e3/uL Final   06/20/2020 196 150 - 450 10e9/L Final     Creatinine   Date Value Ref Range Status   03/25/2024 0.62 (L) 0.67 - 1.17 mg/dL Final   06/20/2020 0.57 0.50 - 1.00 mg/dL Final     Potassium   Date Value Ref Range Status   03/25/2024 4.2 3.4 - 5.3 mmol/L Final   06/20/2020 3.6 3.4 - 5.3 mmol/L Final         EXAM:  Vital signs:  Temp: 98.3  F (36.8  C) Temp src: Oral BP: 128/82 Pulse: 102   Resp: 22 SpO2: 96 % O2 Device: None (Room air)        Estimated body mass index is 46.23 kg/m  as calculated from the following:    Height as of 3/25/24: 1.702 m (5' 7\").    Weight as of 3/25/24: 133.9 kg (295 lb 3.2 oz).    General: Stable. In no acute distress.    Neuro: Alert " and oriented x 3. No focal deficits.  Psych: Appropriate mood and affect. Linear/coherent thought process.   Resp: Normal respirations. Lungs clear to auscultation bilaterally.  Cardio: S1S2, regular rate and rhythm, without murmur, clicks or rubs  Abdomen: Soft, non-distended, non-tender. Right drain to SURENDRA bulb; holding suction; serous drainage noted. Left drain to SURENDRA bulb; holding suction; thin, tan drainage noted in tubing/bulb.       Pre-Sedation Assessment:  Mallampati Airway Classification:  II - Faucial pillars and soft palate may be seen, but uvula is masked by the base of the tongue  Previous reaction to anesthesia/sedation:  No  Sedation plan based on assessment: Moderate (conscious) sedation  ASA Classification: Class 2 - MILD SYSTEMIC DISEASE, NO ACUTE PROBLEMS, NO FUNCTIONAL LIMITATIONS.   Code Status: Full Code intra procedure, per discussion with patient.         ASSESSMENT/PLAN:   Abscessogram with potential intervention to drain(s) with sedation as needed.    Primary care list to be provided to patient and encouraged him to make an appointment regarding the lump he reports that is on the back of his head/neck.     Procedural education reviewed with patient/family in detail including, but not limited to risks, benefits and alternatives with understanding verbalized by patient/family.    Total time spent on the date of the encounter: 35 minutes.      Note Comprised by: Shandra Mercado CNP  Interventional Radiology    Exam and procedure discussion completed by:   Josseline Shah CNP  Interventional Radiology

## 2024-04-08 ENCOUNTER — HOSPITAL ENCOUNTER (OUTPATIENT)
Dept: INTERVENTIONAL RADIOLOGY/VASCULAR | Facility: HOSPITAL | Age: 24
Discharge: HOME | End: 2024-04-08
Attending: NURSE PRACTITIONER
Payer: MEDICAID

## 2024-04-08 ENCOUNTER — HOSPITAL ENCOUNTER (OUTPATIENT)
Dept: CT IMAGING | Facility: HOSPITAL | Age: 24
Discharge: HOME | End: 2024-04-08
Attending: NURSE PRACTITIONER
Payer: MEDICAID

## 2024-04-08 VITALS
TEMPERATURE: 98.3 F | SYSTOLIC BLOOD PRESSURE: 128 MMHG | RESPIRATION RATE: 22 BRPM | DIASTOLIC BLOOD PRESSURE: 82 MMHG | HEART RATE: 102 BPM | OXYGEN SATURATION: 96 %

## 2024-04-08 DIAGNOSIS — R18.8 INTRAABDOMINAL FLUID COLLECTION: ICD-10-CM

## 2024-04-08 PROCEDURE — 250N000011 HC RX IP 250 OP 636: Performed by: NURSE PRACTITIONER

## 2024-04-08 PROCEDURE — 49424 ASSESS CYST CONTRAST INJECT: CPT

## 2024-04-08 PROCEDURE — 76080 X-RAY EXAM OF FISTULA: CPT

## 2024-04-08 PROCEDURE — 74177 CT ABD & PELVIS W/CONTRAST: CPT

## 2024-04-08 PROCEDURE — 999N000127 HC STATISTIC PERIPHERAL IV START W US GUIDANCE

## 2024-04-08 RX ORDER — NALOXONE HYDROCHLORIDE 0.4 MG/ML
0.2 INJECTION, SOLUTION INTRAMUSCULAR; INTRAVENOUS; SUBCUTANEOUS
Status: DISCONTINUED | OUTPATIENT
Start: 2024-04-08 | End: 2024-04-09 | Stop reason: HOSPADM

## 2024-04-08 RX ORDER — IOPAMIDOL 755 MG/ML
90 INJECTION, SOLUTION INTRAVASCULAR ONCE
Status: COMPLETED | OUTPATIENT
Start: 2024-04-08 | End: 2024-04-08

## 2024-04-08 RX ORDER — ONDANSETRON 2 MG/ML
4 INJECTION INTRAMUSCULAR; INTRAVENOUS
Status: DISCONTINUED | OUTPATIENT
Start: 2024-04-08 | End: 2024-04-09 | Stop reason: HOSPADM

## 2024-04-08 RX ORDER — NALOXONE HYDROCHLORIDE 0.4 MG/ML
0.4 INJECTION, SOLUTION INTRAMUSCULAR; INTRAVENOUS; SUBCUTANEOUS
Status: DISCONTINUED | OUTPATIENT
Start: 2024-04-08 | End: 2024-04-09 | Stop reason: HOSPADM

## 2024-04-08 RX ORDER — FLUMAZENIL 0.1 MG/ML
0.2 INJECTION, SOLUTION INTRAVENOUS
Status: DISCONTINUED | OUTPATIENT
Start: 2024-04-08 | End: 2024-04-09 | Stop reason: HOSPADM

## 2024-04-08 RX ORDER — FENTANYL CITRATE 50 UG/ML
25-50 INJECTION, SOLUTION INTRAMUSCULAR; INTRAVENOUS EVERY 5 MIN PRN
Status: DISCONTINUED | OUTPATIENT
Start: 2024-04-08 | End: 2024-04-09 | Stop reason: HOSPADM

## 2024-04-08 RX ADMIN — IOPAMIDOL 90 ML: 755 INJECTION, SOLUTION INTRAVENOUS at 11:57

## 2024-04-08 NOTE — PRE-PROCEDURE
GENERAL PRE-PROCEDURE:   Procedure:  Abscessogram  Date/Time:  4/8/2024 12:40 PM    Written consent obtained?: Yes    Risks and benefits: Risks, benefits and alternatives were discussed    Consent given by:  Patient  Patient states understanding of procedure being performed: Yes    Patient's understanding of procedure matches consent: Yes    Procedure consent matches procedure scheduled: Yes    Expected level of sedation:  Moderate  Appropriately NPO:  Yes  ASA Class:  2  Mallampati  :  Grade 2- soft palate, base of uvula, tonsillar pillars, and portion of posterior pharyngeal wall visible  Lungs:  Lungs clear with good breath sounds bilaterally  Heart:  Normal heart sounds and rate  History & Physical reviewed:  History and physical reviewed and no updates needed  Statement of review:  I have reviewed the lab findings, diagnostic data, medications, and the plan for sedation

## 2024-04-08 NOTE — DISCHARGE INSTRUCTIONS
Drain/Tube Removal Discharge Instructions:  Please follow the below instructions following your drain/tube removal.    Care Instructions after drain/tube removal:  - OK to shower after removal of your drain/tube.  - Avoid stagnant water such as tub baths, Jacuzzis and pools for 3 days after drain/tube removal.  - Keep previous drain/tube exit site covered with dressing of your preference until drainage stops. Change dressings daily and as needed to keep site dry and clean.  - Expect drainage will stop in approximately 3 days time.    Seek medical evaluation for the following:  - Fever (greater than 101 F (38.3C)).  - Purulent (yellow/green/foul smelling) drainage from previous drain exit site.  - Develop pain at or near the previous drain exit site.  - Persistent drainage lasting longer than three days from previous drain exit site.   Drain Check/Exchange/Reposition Discharge Instructions:  Please follow the below instructions after your drain check/exchange/reposition:    Care Instructions after drain placement:  - If you received sedation for your procedure, do not drive or operate heavy machinery for the rest of the day.  - Rest after your drain was placed. Avoid strenuous activity and heavy lifting for the next 2 days. Return to your normal activities as you tolerate after the 2 day restriction.  - You may shower; however, you should not submerge site under water like in a tub bath, Jacuzzi or pool. Cover drain exit site with plastic wrap when showering.  - Keep dressing clean and dry as long as drain is in place. If you have a gauze dressing, please change the dressing as needed to keep site clean and dry.  - You may eat and drink as normal.  - You may have discomfort after the procedure near the drain exit site. You may take acetaminophen (Tylenol ) or ibuprofen (Motrin ) as needed for any discomfort.  - Inspect the tube often for kinks.  - If you have a gravity bag, keep the bag below the drain exit site to  allow for free flow of drainage by gravity.  - Record your daily drain outputs and amount flushed on your drainage record. Bring your records to your next radiology appointment.  - Empty your drainage bag/bulb daily or when it is approximately half way fluid. Follow below instructions for emptying your bag/bulb:  - Clean hands well with soap and water.  - Place a measuring container near the outlet valve of the drainage bag/bulb.  - If you have a drainage BAG: Twist the blue valve at the bottom of the bag while holding the valve over your measuring cup and open container to empty. Re-twist the valve closed on the drainage bag once complete.    - After draining fluid, record your drainage output. Bring this record to your next radiology drain follow up appointment.  - Discard drainage into toilet once fluid drained.      Follow up: Please contact [facility]patient Scheduling at [phone]    ?  Please seek medical evaluation for:  - Nausea and/or vomiting.  - Diffuse abdominal pain.  - Fevers (greater than 101 F (38.3C)).    Call [facility] [phone] at with tube related questions or concerns:  - Drainage tube falls out, is pulled back or felt to be out of position.   - Unable to flush drainage tube.   - Leakage (or purulent drainage) around drainage tube site.   - Extreme pain at drainage tube site.   - Outputs suddenly stop or significantly reduces.   - Warmth, redness, swelling or tenderness around the drainage tube.

## 2024-04-08 NOTE — PROCEDURES
Interventional Radiology Post-Procedure Note     Patient name:  Mynor Rollins  MRN:  [id number]   Date:  4/8/2024     Procedure(s):   1) Left pelvic drain sinogram  2) Midline pelvic drain sinogram and removal    Attending:  Mya    Sedation:  None    Pre/Post Procedure Diagnosis: Complicated appendicitis    Drains/Lines:  Indwelling 12Fr LLQ drain    Specimen(s):  None    Estimated Blood Loss:  Minimal    Complications:  None     Findings:    Left pelvic drain sinogram demonstrates collapsed cavity however with colonic fistula. No revision performed.  Midline pelvic drain sinogram demonstrates collapsed cavity without fistula. Drain removed and bandages placed.    Please see dictation in PACS or under the Imaging tab in [email or web address] for detailed procedure note.    Plan:  In order to promote fistula closure, LLQ drain to gravity drainage and no further flushing. Repeat sinogram in 7-10 days. No repeat CT needed.      Chandan Roque MD  Vascular & Interventional Radiology  [facility]  4/8/2024  1:29 PM

## 2024-04-11 NOTE — PROGRESS NOTES
"  Interventional Radiology - Pre-Procedure Note:  Outpatient - [facility]  04/15/2024     Procedure Requested: Abscessogram  Requested by: Josseline Shah APRN CNP     History and Physical Reviewed: H&P documented within 30 days (by Dr. Whaley on 03/18/2024). I have personally reviewed the patient's medical history and have updated the medical record as necessary.    Brief HPI: Mynor Rollins is a 23 year old male with PMHx of of asthma and DM II who was admitted to [facility] from 03/18-03/23/2024 for perforated diverticulitis with LLQ fluid collection s/p drainage catheter placement on 03/13 and central pelvic fluid collection s/p drainage catheter placement on 03/19.      Abscessogram 03/28/2024 with malpositioned LLQ drain, replaced/repositioned, colonic fistula noted. Also with malpositioned midline pelvic drain, successfully replaced/repositioned, no fistula noted. 04/08 abscessogram demonstrated \"functional left pelvic drain controlling a colonic fistula\". Midline pelvic drain was removed, see imaging below. Presents for follow up abscessogram. Denies any leaking or other issues with the drain.    Planning on colonoscopy in the near future.     Flushing: Not flushing  Outputs: about 2 ml/day      IMAGING:  IR Abscess Tube Check   [facility]  LOC[facility]: [facility]  DATE: 4/8/2024     PROCEDURE:   1. LEFT PELVIC DRAIN SINOGRAM  2. MIDLINE PELVIC DRAIN SINOGRAM AND REMOVAL     INTERVENTIONAL RADIOLOGIST: Chandan Roque MD     INDICATION: 23-year-old male with complicated diverticulitis and pelvic abscesses managed with two indwelling percutaneous drains. Patient presents for follow-up drain checks.     CONSENT: The risks, benefits and alternatives of a sinogram with possible drain exchange, manipulation or removal were discussed with the patient in detail. All questions were answered. Informed consent was given to " proceed with the procedure.     MODERATE SEDATION: None.     CONTRAST: 20 mL Omnipaque intracavitary.   ANTIBIOTICS: None.  ADDITIONAL MEDICATIONS: None.     FLUOROSCOPIC TIME: 1.0 minutes.  RADIATION DOSE: Air Kerma: 143 mGy.     COMPLICATIONS: No immediate complications.     STERILE BARRIER TECHNIQUE: Maximum sterile barrier technique was used. Cutaneous antisepsis was performed at the operative site with application of 2% chlorhexidine and large sterile drape. Prior to the procedure, the  and assistant performed   hand hygiene and wore hat, mask, sterile gown, and sterile gloves during the entire procedure.     PROCEDURE:     fluoroscopic image of the pelvis was obtained.      The indwelling left lower quadrant pelvic drainage catheter was injected with a small amount of contrast and multiple images were obtained. Based on the results of the study the drainage catheter was left in place, and switched to gravity bag drainage.     The indwelling midline pelvic drainage catheter was injected with a small amount of contrast and images obtained in multiple obliquities. Based on the results of the study, the drain was cut and removed in its entirety. Dressings were placed.     FINDINGS:  1. Left pelvic drain sinogram demonstrates collapsed cavity, however with associated colonic fistula.  2. Midline pelvic drain sinogram demonstrates collapsed cavity and no evidence of associated fistula.                                                                      IMPRESSION:    1. Functional left pelvic drain controlling a colonic fistula, as described.  2. Functional midline pelvic drain with collapsed cavity and without fistula. Drain removed without issue.     PLAN:  In order to promote fistula closure, left pelvic drain to gravity bag and no further flushing. Follow-up sinogram in 7-10 days to reassess. No repeat CT needed.    NPO: Midnight  ANTICOAGULANTS: None  ANTIBIOTICS: Not needed    ALLERGIES  No Known  Allergies      LABS:  INR   Date Value Ref Range Status   03/13/2024 1.20 (H) 0.85 - 1.15 Final      Hemoglobin   Date Value Ref Range Status   03/23/2024 13.3 13.3 - 17.7 g/dL Final   06/18/2020 15.9 13.3 - 17.7 g/dL Final     Platelet Count   Date Value Ref Range Status   03/23/2024 557 (H) 150 - 450 10e3/uL Final   06/20/2020 196 150 - 450 10e9/L Final     Creatinine   Date Value Ref Range Status   03/25/2024 0.62 (L) 0.67 - 1.17 mg/dL Final   06/20/2020 0.57 0.50 - 1.00 mg/dL Final     Potassium   Date Value Ref Range Status   03/25/2024 4.2 3.4 - 5.3 mmol/L Final   06/20/2020 3.6 3.4 - 5.3 mmol/L Final         EXAM:  /84   Pulse 118   Temp 97.7  F (36.5  C) (Oral)   Resp 14   SpO2 96%   General: Stable. In no acute distress.    Neuro: Alert and oriented x 3. No focal deficits.  Psych: Appropriate mood and affect. Linear/coherent thought process.   Resp: Normal respirations. Lungs clear to auscultation bilaterally.  Cardio: S1S2, regular rate and rhythm, without murmur, clicks or rubs.  Skin: Warm and dry. Without excoriations, ecchymosis, erythema, lesions or open sores. Drain CDI, scant burgundy colored fluid noted in bag.    Pre-Sedation Assessment:  Mallampati Airway Classification:  III - Only soft palate is visible. Intubation is predicted to be difficult  Previous reaction to anesthesia/sedation:  No  Sedation plan based on assessment: Moderate (conscious) sedation  ASA Classification: Class 3 - SEVERE SYSTEMIC DISEASE, DEFINITE FUNCTIONAL LIMITATIONS.   Code Status: FULL CODE      ASSESSMENT/PLAN:   Abscessogram with possible intervention with sedation as needed.     Procedural education reviewed with patient/family in detail including, but not limited to risks, benefits and alternatives with understanding verbalized by patient/family.    Total time spent on the date of the encounter: 20 minutes.      DEBORAH FAIRCHILD CNP  Interventional Radiology

## 2024-04-12 ENCOUNTER — TELEPHONE (OUTPATIENT)
Dept: INTERVENTIONAL RADIOLOGY/VASCULAR | Facility: HOSPITAL | Age: 24
End: 2024-04-12
Payer: MEDICAID

## 2024-04-15 ENCOUNTER — HOSPITAL ENCOUNTER (OUTPATIENT)
Dept: INTERVENTIONAL RADIOLOGY/VASCULAR | Facility: HOSPITAL | Age: 24
Discharge: HOME | End: 2024-04-15
Attending: NURSE PRACTITIONER | Admitting: RADIOLOGY
Payer: MEDICAID

## 2024-04-15 VITALS
DIASTOLIC BLOOD PRESSURE: 84 MMHG | RESPIRATION RATE: 14 BRPM | OXYGEN SATURATION: 96 % | TEMPERATURE: 97.7 F | HEART RATE: 118 BPM | SYSTOLIC BLOOD PRESSURE: 139 MMHG

## 2024-04-15 DIAGNOSIS — K57.20 DIVERTICULITIS OF LARGE INTESTINE WITH ABSCESS WITHOUT BLEEDING: ICD-10-CM

## 2024-04-15 DIAGNOSIS — L98.8 FISTULA: Primary | ICD-10-CM

## 2024-04-15 PROCEDURE — 49424 ASSESS CYST CONTRAST INJECT: CPT

## 2024-04-15 RX ORDER — NALOXONE HYDROCHLORIDE 0.4 MG/ML
0.4 INJECTION, SOLUTION INTRAMUSCULAR; INTRAVENOUS; SUBCUTANEOUS
Status: DISCONTINUED | OUTPATIENT
Start: 2024-04-15 | End: 2024-04-16 | Stop reason: HOSPADM

## 2024-04-15 RX ORDER — ONDANSETRON 2 MG/ML
4 INJECTION INTRAMUSCULAR; INTRAVENOUS
Status: DISCONTINUED | OUTPATIENT
Start: 2024-04-15 | End: 2024-04-16 | Stop reason: HOSPADM

## 2024-04-15 RX ORDER — LIDOCAINE 40 MG/G
CREAM TOPICAL
Status: DISCONTINUED | OUTPATIENT
Start: 2024-04-15 | End: 2024-04-16 | Stop reason: HOSPADM

## 2024-04-15 RX ORDER — FENTANYL CITRATE 50 UG/ML
25-50 INJECTION, SOLUTION INTRAMUSCULAR; INTRAVENOUS EVERY 5 MIN PRN
Status: DISCONTINUED | OUTPATIENT
Start: 2024-04-15 | End: 2024-04-16 | Stop reason: HOSPADM

## 2024-04-15 RX ORDER — NALOXONE HYDROCHLORIDE 0.4 MG/ML
0.2 INJECTION, SOLUTION INTRAMUSCULAR; INTRAVENOUS; SUBCUTANEOUS
Status: DISCONTINUED | OUTPATIENT
Start: 2024-04-15 | End: 2024-04-16 | Stop reason: HOSPADM

## 2024-04-15 RX ORDER — FLUMAZENIL 0.1 MG/ML
0.2 INJECTION, SOLUTION INTRAVENOUS
Status: DISCONTINUED | OUTPATIENT
Start: 2024-04-15 | End: 2024-04-16 | Stop reason: HOSPADM

## 2024-04-15 NOTE — PRE-PROCEDURE
GENERAL PRE-PROCEDURE:   Procedure:  Abscessogram  Date/Time:  4/15/2024 12:22 PM    Written consent obtained?: Yes    Risks and benefits: Risks, benefits and alternatives were discussed    Consent given by:  Patient  Patient states understanding of procedure being performed: Yes    Patient's understanding of procedure matches consent: Yes    Procedure consent matches procedure scheduled: Yes    Expected level of sedation:  Moderate  Appropriately NPO:  Yes  ASA Class:  3  Mallampati  :  Grade 3- soft palate visible, posterior pharyngeal wall not visible  Lungs:  Lungs clear with good breath sounds bilaterally  Heart:  Normal heart sounds and rate  History & Physical reviewed:  History and physical reviewed and no updates needed  Statement of review:  I have reviewed the lab findings, diagnostic data, medications, and the plan for sedation

## 2024-04-15 NOTE — PROCEDURES
Interventional Radiology Post-Procedure Note   ?   Brief Procedure Note:   Patient name: Mynor Rollins  Pt MRN:[id number]   Date of procedure: 4/15/2024     Procedure(s): Abscessogram  Sedation method: None.  Pre Procedure Diagnosis: Colonic fistula  Post Procedure Diagnosis: Same  Indications: Drain check   ?   Specimen(s) removed: None   Additional studies ordered: None  Drains: LLQ drain  Estimated Blood Loss: Minimal  Complications: None  Vascular closure method: N/A    Findings/Notes/Comments: Persistent fistula to adjacent bowel. Drain was left in place.    Patient will present in 2 weeks for repeat drain check.    ?   Please see dictation in PACS or under the Imaging tab in [employer] for detailed procedure note.     Angel Mclaughlin M.D.   Vascular and Interventional Radiology   Pager: [phone]   After Hours / Scheduling: [phone]     4/15/2024  12:35 PM

## 2024-04-15 NOTE — SEDATION DOCUMENTATION
Patient Name: Mynor Rollins  Medical Record Number: [id number]  Today's Date: 4/15/2024    Procedure: abcessogram  Proceduralist: Dr. Mclaughlin    Procedure Start: 12:32  Procedure end: 12:35      Other Notes: Pt arrived to IR room 1 from IR holding. Consent reviewed. Pt denies any questions or concerns regarding procedure. Pt positioned supine and monitored per protocol. Pt tolerated procedure without any noted complications. Pt transferred back to IR holding.

## 2024-04-15 NOTE — DISCHARGE INSTRUCTIONS
Drain Check/Exchange/Reposition Discharge Instructions:  Please follow the below instructions after your drain check/exchange/reposition:    Care Instructions after drain placement:  - If you received sedation for your procedure, do not drive or operate heavy machinery for the rest of the day.  - Rest after your drain was placed. Avoid strenuous activity and heavy lifting for the next 2 days. Return to your normal activities as you tolerate after the 2 day restriction.  - You may shower; however, you should not submerge site under water like in a tub bath, Jacuzzi or pool. Cover drain exit site with plastic wrap when showering.  - Keep dressing clean and dry as long as drain is in place. If you have a gauze dressing, please change the dressing as needed to keep site clean and dry.  - You may eat and drink as normal.  - You may have discomfort after the procedure near the drain exit site. You may take acetaminophen (Tylenol ) or ibuprofen (Motrin ) as needed for any discomfort.  - Inspect the tube often for kinks.  - If you have a gravity bag, keep the bag below the drain exit site to allow for free flow of drainage by gravity.  - Record your daily drain outputs and amount flushed on your drainage record. Bring your records to your next radiology appointment.  - Empty your drainage bag/bulb daily or when it is approximately half way fluid. Follow below instructions for emptying your bag/bulb:  - Clean hands well with soap and water.  - Place a measuring container near the outlet valve of the drainage bag/bulb.  - If you have a drainage BAG: Twist the blue valve at the bottom of the bag while holding the valve over your measuring cup and open container to empty. Re-twist the valve closed on the drainage bag once complete.  - If you have a drainage BULB: Open the bulb cap (at the top of the bulb). Empty the fluid into the measuring cup. Squeeze bulb and hold flat. While bulb is squeezed, close the cap.  - After  draining fluid, record your drainage output. Bring this record to your next radiology drain follow up appointment.  - Discard drainage into toilet once fluid drained.      Follow up: Please contact [facility] Outpatient Scheduling at [phone]    ?  Please seek medical evaluation for:  - Nausea and/or vomiting.  - Diffuse abdominal pain.  - Fevers (greater than 101 F (38.3C)).    Call Daphne Chun [phone] at with tube related questions or concerns:  - Drainage tube falls out, is pulled back or felt to be out of position.   - Unable to flush drainage tube.   - Leakage (or purulent drainage) around drainage tube site.   - Extreme pain at drainage tube site.   - Outputs suddenly stop or significantly reduces.   - Warmth, redness, swelling or tenderness around the drainage tube.

## 2024-04-25 RX ORDER — LIDOCAINE 40 MG/G
CREAM TOPICAL
Status: CANCELLED | OUTPATIENT
Start: 2024-04-25

## 2024-04-25 NOTE — PRE-PROCEDURE
Interventional Radiology Pre-Procedure Assessment  Outpatient - [facility] - 04/29/24     Procedure requested: Abscessogram  Requesting provider: Josseline Forrest APRN, CNP    Brief HPI  Mynor Rollins is a 23 year old male with a pertinent past medical history of asthma and diabetes mellitus type II who was admitted to [facility] from 3/18 - 3/23/2024 for perforated diverticulitis with left lower quadrant fluid collection s/p drainage catheter placement on 3/13 and central pelvic fluid collection s/p drainage catheter placement on 3/19.    Left lower quadrant fluid collection culture grew Clostridium perfringens, Enterococcus faecalis, Enterococcus durans and Escherichia coli.  Central pelvic fluid collection culture without growth.  Discharged home on Augmentin.    Abscessogram history:  3/28: Left lower quadrant drain malpositioned, replaced. Colonic fistula noted.          Midline pelvic abscess drain malpositioned, replaced. No fistula.    4/8: Left pelvic drain with collapsed cavity. Persistent colonic fistula.         Midline pelvic drain with collapsed cavity. No fistula. Drain removed.    4/15: Left lower quadrant drain without significant residual fluid collection. Persistent enteric fistula.    NPO since Midnight  ANTICOAGULANT(S): None    IMAGING  4/15/2024 ABSCESS TUBE CHECK  PROCEDURE:    A  image was obtained.  The pre-existing drainage catheter was injected with a small amount of contrast and multiple images were obtained. Based on the results of the study the drainage catheter was left in place.     FINDINGS:  No significant residual pericatheter fluid collection, however there is persistent enteric fistula. The drain was left in place.                                                  IMPRESSION:    Persistent fistula.     PLAN:  Keep drain to gravity.  No flushing.  Patient will follow-up in 2 weeks for repeat drain  check.    ALLERGIES  No Known Allergies    LABORATORY VALUES  INR   Date Value Ref Range Status   03/13/2024 1.20 (H) 0.85 - 1.15 Final      Hemoglobin   Date Value Ref Range Status   03/23/2024 13.3 13.3 - 17.7 g/dL Final   06/18/2020 15.9 13.3 - 17.7 g/dL Final     Platelet Count   Date Value Ref Range Status   03/23/2024 557 (H) 150 - 450 10e3/uL Final   06/20/2020 196 150 - 450 10e9/L Final     Creatinine   Date Value Ref Range Status   03/25/2024 0.62 (L) 0.67 - 1.17 mg/dL Final   06/20/2020 0.57 0.50 - 1.00 mg/dL Final     Potassium   Date Value Ref Range Status   03/25/2024 4.2 3.4 - 5.3 mmol/L Final   06/20/2020 3.6 3.4 - 5.3 mmol/L Final     EXAM:  /81 (BP Location: Right arm)   Pulse 109   Temp 97.8  F (36.6  C) (Oral)   Resp 16   SpO2 96%   General: Stable. In no acute distress.    Neurologic: Alert and oriented x 3. No focal deficits.  Psychiatric: Appropriate mood and affect. Cooperative. Answering questions appropriately. Linear/coherent thought process.   Respiratory: Normal respirations on room air. Lungs clear to auscultation bilaterally.  Cardiac: S1S2, regular rate and rhythm, without murmur, clicks or rubs.  Drains(s)/Tube(s):   - Abscess Drain: LLQ drain to gravity drainage with  rust colored minmal  output. Dressing clean, dry, and intact. Stitch present. No leaking appreciated from drain exit site. Drain site nontender with palpation. Tubing appears intact and patent. Draining well.         PRE-SEDATION ASSESSMENT  Mallampati Airway Classification:  II - Faucial pillars and soft palate may be seen, but uvula is masked by the base of the tongue  Previous reaction to anesthesia/sedation:  No  Sedation plan based on assessment: Moderate (conscious) sedation  ASA Classification: Class 2 - MILD SYSTEMIC DISEASE, NO ACUTE PROBLEMS, NO FUNCTIONAL LIMITATIONS.  Code Status: Continue  patient's current code status (Prior - FULL CODE) intraprocedure, per discussion with  patient.    ASSESSMENT  23 year old male recently admitted for perforated diverticulitis with fluid collections s/p drainage catheter placements. One drainage catheter remains.     Denies fever, chills, nausea, vomiting, pain and/or leaking around drainage catheter.  No longer flushing.    Reports approximately 100 cc output in total since last abscessogram on 4/15 which equates to approx 7mL daily    PLAN  Perforated diverticulitis  Left lower quadrant fluid collection    Abscessogram with possible intervention and sedation IF needed.      Procedural education reviewed with patient in detail including but not limited to risks, benefits and alternatives. Patient verbalized understanding.      Total time spent on the date of the encounter is 30 minutes.    Shandra Mercado, DEBORAH CNP

## 2024-04-26 ENCOUNTER — TELEPHONE (OUTPATIENT)
Dept: INTERVENTIONAL RADIOLOGY/VASCULAR | Facility: HOSPITAL | Age: 24
End: 2024-04-26
Payer: MEDICAID

## 2024-04-29 ENCOUNTER — HOSPITAL ENCOUNTER (OUTPATIENT)
Dept: INTERVENTIONAL RADIOLOGY/VASCULAR | Facility: HOSPITAL | Age: 24
Discharge: HOME | End: 2024-04-29
Admitting: RADIOLOGY
Payer: MEDICAID

## 2024-04-29 VITALS
DIASTOLIC BLOOD PRESSURE: 81 MMHG | TEMPERATURE: 97.8 F | SYSTOLIC BLOOD PRESSURE: 135 MMHG | OXYGEN SATURATION: 96 % | RESPIRATION RATE: 16 BRPM | HEART RATE: 109 BPM

## 2024-04-29 DIAGNOSIS — L98.8 FISTULA: Primary | ICD-10-CM

## 2024-04-29 DIAGNOSIS — L98.8 FISTULA: ICD-10-CM

## 2024-04-29 PROCEDURE — 49424 ASSESS CYST CONTRAST INJECT: CPT

## 2024-04-29 RX ORDER — NALOXONE HYDROCHLORIDE 0.4 MG/ML
0.4 INJECTION, SOLUTION INTRAMUSCULAR; INTRAVENOUS; SUBCUTANEOUS
Status: DISCONTINUED | OUTPATIENT
Start: 2024-04-29 | End: 2024-04-30 | Stop reason: HOSPADM

## 2024-04-29 RX ORDER — ONDANSETRON 2 MG/ML
4 INJECTION INTRAMUSCULAR; INTRAVENOUS
Status: DISCONTINUED | OUTPATIENT
Start: 2024-04-29 | End: 2024-04-30 | Stop reason: HOSPADM

## 2024-04-29 RX ORDER — NALOXONE HYDROCHLORIDE 0.4 MG/ML
0.2 INJECTION, SOLUTION INTRAMUSCULAR; INTRAVENOUS; SUBCUTANEOUS
Status: DISCONTINUED | OUTPATIENT
Start: 2024-04-29 | End: 2024-04-30 | Stop reason: HOSPADM

## 2024-04-29 RX ORDER — FENTANYL CITRATE 50 UG/ML
25-50 INJECTION, SOLUTION INTRAMUSCULAR; INTRAVENOUS EVERY 5 MIN PRN
Status: DISCONTINUED | OUTPATIENT
Start: 2024-04-29 | End: 2024-04-30 | Stop reason: HOSPADM

## 2024-04-29 RX ORDER — FLUMAZENIL 0.1 MG/ML
0.2 INJECTION, SOLUTION INTRAVENOUS
Status: DISCONTINUED | OUTPATIENT
Start: 2024-04-29 | End: 2024-04-30 | Stop reason: HOSPADM

## 2024-04-29 NOTE — DISCHARGE INSTRUCTIONS
Drain Placement Discharge Instructions:  You had a small tube or drain(s) placed. This was placed so the abnormal fluid collection within your body can be drained out (externally). Sometimes drains must stay in place for weeks to months. Please follow the below instructions as you recover:    Care Instructions after drain placement:  - Rest after your drain was placed. Avoid strenuous activity and heavy lifting for the next 2 days. Return to your normal activities as you tolerate after the 2 day restriction.  - You may shower; however, you should not submerge site under water like in a tub bath, Jacuzzi or pool. Keep drain exit site covered with plastic wrap and tape when showering.  - Keep dressing clean and dry as long as drain is in place. If you have a gauze dressing, please change the dressing daily and as needed to keep site clean and dry.  - You may eat and drink as normal.  - You may have discomfort after the procedure near the drain exit site. You may take acetaminophen (Tylenol ) or ibuprofen (Motrin ) as needed for any discomfort.  - Protect the drain from being pulled out or dislodged.  - Inspect the tube often for kinks.  - If you have a gravity bag, keep the bag below the drain exit site to allow for free flow of drainage by gravity.    - Record your daily drain outputs and amount flushed on your drainage record. Bring your records to your next radiology appointment. Often drains will need to stay in place until the drainage output is less than about 15mL a day for 2-3 days in a row.  - Empty your drainage bag/bulb daily or when it is approximately half way fluid. Follow below instructions for emptying your bag/bulb:       - Clean hands well with soap and water.       - Place a measuring container near the outlet valve of the drainage bag/bulb.       - If you have a drainage BAG: Twist the blue valve at the bottom of the bag while holding the valve over your measuring cup and open container to empty.  Re-twist the valve closed on the drainage bag once complete.       - If you have a drainage BULB: Open the bulb cap (at the top of the bulb). Empty the fluid into the measuring cup. Squeeze bulb and hold flat. While bulb is squeezed, close the cap.       - After draining fluid record your drainage output.        - Discard drainage into toilet once fluid drained.    Follow-Up:   - Please contact [facility] Outpatient Scheduling at [phone].    Please seek medical evaluation for:  - Nausea and/or vomiting   - Diffuse abdominal pain  - Fevers (greater than 101 F)  - Drainage tube falls out, is pulled back or felt to be out of position.     Call [place] IR RN Line at [phone] with tube related questions or concerns:  - Unable to flush drainage tube.   - Leakage (or purulent drainage) around drainage tube site.   - Extreme pain at drainage tube site.   - Outputs suddenly stop or significantly reduces.   - Warmth, redness, swelling or tenderness around the drainage tube

## 2024-04-29 NOTE — PROCEDURES
Interventional Radiology Post-Procedure Note   ?   Brief Procedure Note:   Patient name: Mynor Rollins  Pt MRN:[id number]   Date of procedure: 4/29/2024     Procedure(s): Drain check  Sedation method: Moderate sedation was not employed.   Pre Procedure Diagnosis: Fistula  Post Procedure Diagnosis: Same  Indications: Drain check   ?   Specimen(s) removed: None   Additional studies ordered: None  Drains: Abscess drain  Estimated Blood Loss: Minimal  Complications: None  Vascular closure method: N/A    Findings/Notes/Comments: Persistent colonic fistula. Output is down to about 2mL per day. Patient will present in 2 weeks for repeat drain check.    ?   Please see dictation in PACS or under the Imaging tab in [employer] for detailed procedure note.     Angel Mclaughlin M.D.   Vascular and Interventional Radiology   Pager: [phone]   After Hours / Scheduling: [phone]     4/29/2024  11:36 AM

## 2024-04-29 NOTE — PRE-PROCEDURE
GENERAL PRE-PROCEDURE:   Procedure:  Abscessogram  Date/Time:  4/29/2024 11:13 AM    Written consent obtained?: Yes    Risks and benefits: Risks, benefits and alternatives were discussed    Consent given by:  Patient  Patient states understanding of procedure being performed: Yes    Patient's understanding of procedure matches consent: Yes    Procedure consent matches procedure scheduled: Yes    Expected level of sedation:  Moderate  Appropriately NPO:  Yes  ASA Class:  2  Mallampati  :  Grade 2- soft palate, base of uvula, tonsillar pillars, and portion of posterior pharyngeal wall visible  Lungs:  Lungs clear with good breath sounds bilaterally  Heart:  Normal heart sounds and rate  History & Physical reviewed:  History and physical reviewed and no updates needed  Statement of review:  I have reviewed the lab findings, diagnostic data, medications, and the plan for sedation

## 2024-05-02 ENCOUNTER — OFFICE VISIT (OUTPATIENT)
Dept: FAMILY MEDICINE | Facility: CLINIC | Age: 24
End: 2024-05-02
Payer: COMMERCIAL

## 2024-05-02 VITALS
TEMPERATURE: 98.7 F | OXYGEN SATURATION: 96 % | HEART RATE: 105 BPM | SYSTOLIC BLOOD PRESSURE: 120 MMHG | WEIGHT: 300 LBS | DIASTOLIC BLOOD PRESSURE: 72 MMHG | HEIGHT: 67 IN | RESPIRATION RATE: 20 BRPM | BODY MASS INDEX: 47.09 KG/M2

## 2024-05-02 DIAGNOSIS — E11.9 TYPE 2 DIABETES MELLITUS WITHOUT COMPLICATION, WITHOUT LONG-TERM CURRENT USE OF INSULIN (H): ICD-10-CM

## 2024-05-02 DIAGNOSIS — K57.20 DIVERTICULITIS OF COLON WITH PERFORATION: Primary | ICD-10-CM

## 2024-05-02 PROCEDURE — 99204 OFFICE O/P NEW MOD 45 MIN: CPT | Performed by: FAMILY MEDICINE

## 2024-05-02 ASSESSMENT — PAIN SCALES - GENERAL: PAINLEVEL: NO PAIN (0)

## 2024-05-02 NOTE — PATIENT INSTRUCTIONS
See surgeon as planned.      Schedule wellness exam plus diabetes follow up some time in July 2024.

## 2024-05-02 NOTE — COMMUNITY RESOURCES LIST (ENGLISH)
May 2, 2024           YOUR PERSONALIZED LIST OF SERVICES & PROGRAMS           & SHELTER    Housing      [facility] - Hotline - Housing crisis  [address] [place] [address] (Distance: 14.8 miles)  Phone: [phone]) [phone]  Language: English      [facility] - [place] [facility]  Phone: [phone]) [phone]  Website: https://www.SocialmothBeaker/  Language: English, Hmong, Oromo, Czech, Latvian  Hours: Mon 9:00 AM - 5:00 PM Tue 9:00 AM - 5:00 PM Wed 9:00 AM - 5:00 PM Thu 9:00 AM - 5:00 PM Fri 9:00 AM - 5:00 PM  Fee: Insurance  Accessibility: Blind accommodation, Deaf or hard of hearing, Translation services  Transportation Options: Free transportation      [facility] - Housing Stabilization Services  Phone: (447) 809-2191  Website: https://Meiaoju/Housing-Stabilization.html  Language: English  Hours: Mon 9:00 AM - 5:00 PM Tue 9:00 AM - 5:00 PM Wed 9:00 AM - 5:00 PM Thu 9:00 AM - 5:00 PM Fri 9:00 AM - 5:00 PM  Fee: Insurance  Accessibility: Deaf or hard of hearing, Translation services    Case Management      [facility] - Housing Stabilization  [address] [place] [address] (Distance: 24.8 miles)  Phone: [phone]  Language: English  Fee: Self pay, Insurance  Accessibility: Translation services      [facility] [employer] ([employer]) - ACCAP Rental Housing  [address] [address] (Distance: 14.6 miles)  Phone: [phone]  Language: English  Fee: Free  Accessibility: Ada accessible, Blind accommodation, Deaf or hard of hearing      [employer] - Housing Stabilization Services  Phone: (834) [phone]  Website: https://homebasemn.com/  Language: English  Hours: Mon 8:00 AM - 4:00 PM Tue 8:00 AM - 4:00 PM Wed 8:00 AM - 4:00 PM Thu 8:00 AM - 4:00 PM Fri 8:00 AM - 4:00 PM  Fee: Free  Accessibility: Blind accommodation, Deaf or hard of hearing  Transportation Options: Free transportation    Drop-In  Services      [facility] - Warming or cooling center  [address] [place] [address] (Distance: 15.2 miles)  Phone: [phone]  Language: Hmong, Kosovan, English  Fee: Free  Accessibility: Ada accessible, Blind accommodation, Deaf or hard of hearing      [employer] - Drop-in center or day shelter  [address] [place] [address] (Distance: 14.4 miles)  Phone: [phone]) [phone]  Website: http://[address].org  Language: English  Fee: Free  Accessibility: Ada accessible, Translation services, Blind accommodation, Deaf or hard of hearing      [employer]  Website: http://www.laundrylove.org               IMPORTANT NUMBERS & WEBSITES        Emergency Services  911  .   [facility]  211 http://211unitedway.org  .   Poison Control  (643) 389-7659 http://[place]poison.org http://wis[place]poison.org  .     Suicide and Crisis Lifeline  988 http://988lifeline.org  .   [facility] [place] Child Abuse Hotline  403.420.5159 http://Childhelphotline.org   .   National Sexual Assault Hotline  (974) 864-6854 (HOPE) http://Docea Powern.org   .     National Runaway Safeline  (911) 887-2488 (RUNAWAY) http://NowPublicrunaSonexa Therapeutics.org  .   Pregnancy & Postpartum Support  Call/text 364-808-6720  [place]: http://ppsupportmn.org  [place]: http://Achaogen.com/wi  .   Substance Abuse National Helpline ([facility])  325-591-HELP (8481) http://Findtreatment.gov   .                DISCLAIMER: These resources have been generated via the [employer] Platform. [employer] does not endorse any service providers mentioned in this resource list. [employer] does not guarantee that the services mentioned in this resource list will be available to you or will improve your health or wellness.    [facility]

## 2024-05-02 NOTE — PROGRESS NOTES
Assessment & Plan     Diverticulitis of colon with perforation  Resolving. Drain still in place but no complications.  Seeing surgeon later this month for colonoscopy at [facility].  Return precautions discussed and given to patient.     Type 2 diabetes mellitus without complication, without long-term current use of insulin (H)  Patient to continue current regimen. Has appt with DM education later this month.  Increases risk of complications after surgery and with the condition above.  Reviewed meds with patient - no need for refills.  Patient to schedule wellness plus DM follow up in July 2024.      MED REC REQUIRED  Post Medication Reconciliation Status: discharge medications reconciled, continue medications without change    Patient Instructions   See surgeon as planned.      Schedule wellness exam plus diabetes follow up some time in July 2024.    Aaron Prado is a 23 year old, presenting for the following health issues:  Establish Care, Abscess (Follow up, pt is being treated with drainage of abscess and diverticulitis. Was told to get a pcp to follow up. ), and ER F/U        5/2/2024    11:16 AM   Additional Questions   Roomed by Blanca AREVALO MA   Accompanied by self         5/2/2024    11:16 AM   Patient Reported Additional Medications   Patient reports taking the following new medications none     Via the Health Maintenance questionnaire, the patient has reported the following services have been completed -Eye Exam, this information has been sent to the abstraction team.  Abscess    History of Present Illness       Reason for visit:  Establish primary care    He eats 2-3 servings of fruits and vegetables daily.He consumes 1 sweetened beverage(s) daily.He exercises with enough effort to increase his heart rate 10 to 19 minutes per day.  He exercises with enough effort to increase his heart rate 3 or less days per week.   He is taking medications regularly.         Hospital Follow-up  Visit:    Hospital/Nursing Home/IP Rehab Facility: [facility].  Date of Admission: 3/13/24- and 3/18/24-   Date of Discharge: 3/18/24 and  3/23/24  Reason(s) for Admission: Diverticulitis, Abscess, Drainage tube placement   Was the patient in the ICU or did the patient experience delirium during hospitalization?  No  Do you have any other stressors you would like to discuss with your provider? No    Problems taking medications regularly:  None  Medication changes since discharge: None  Problems adhering to non-medication therapy:  None    Summary of hospitalization:  [facility] discharge summary reviewed            Reason for your hospital stay     Diverticulitis of large  With abscess          Follow-up and recommended labs and tests      Follow up with primary care provider, Physician No Ref-Primary, within 7 days for hospital follow- up.  The following labs/tests are recommended: Colonoscopy in 4 to 6 weeks.          Activity     Your activity upon discharge: ambulate in house          Diet     Follow this diet upon discharge: Orders Placed This Encounter      Low Fiber Diet     Diagnostic Tests/Treatments reviewed.  Follow up needed: see above; colonoscopy supposed to be this month  Other Healthcare Providers Involved in Patient s Care:         Specialist appointment - radiology for drain check and Surgical follow-up appointment - non-[facility] surgeon   Update since discharge: improved.         Plan of care communicated with patient           Patient reports the lump in back of head he mentioned at time of appt scheduling has subsided.      Review of Systems  Constitutional, neuro, ENT, endocrine, pulmonary, cardiac, gastrointestinal, genitourinary, musculoskeletal, integument and psychiatric systems are negative, except as otherwise noted.      Objective    /72 (BP Location: Right arm, Patient Position: Sitting, Cuff Size:  "Adult Large)   Pulse 105   Temp 98.7  F (37.1  C) (Tympanic)   Resp 20   Ht 1.697 m (5' 6.8\")   Wt 136.1 kg (300 lb)   SpO2 96%   BMI 47.27 kg/m    Body mass index is 47.27 kg/m .  Physical Exam   GENERAL: morbidly obese, ambulatory w/o assist , alert and no distress  EYES: no icterus  RESP: lungs clear to auscultation - no rales, no rhonchi, no wheezes  CV: regular rates and rhythm, normal S1 S2, no S3 or S4 and no murmur, no click or rub  ABD: protuberant soft abdomen, no induration around drain site in the LLQ, no TTP, no palpable mass, no guarding, no Fofana's sign, no palpable organomegaly  MS: extremities- no gross deformities noted, no edema  SKIN: good turgor, no jaundice or rash     No results found for any visits on 05/02/24.        Signed Electronically by: Shaggy Joshi MD    "

## 2024-05-05 ENCOUNTER — HEALTH MAINTENANCE LETTER (OUTPATIENT)
Age: 24
End: 2024-05-05

## 2024-05-08 ENCOUNTER — PREP FOR PROCEDURE (OUTPATIENT)
Dept: SURGERY | Facility: CLINIC | Age: 24
End: 2024-05-08
Payer: COMMERCIAL

## 2024-05-08 DIAGNOSIS — K57.32 DIVERTICULITIS OF COLON: Primary | ICD-10-CM

## 2024-05-13 ENCOUNTER — MYC MEDICAL ADVICE (OUTPATIENT)
Dept: INTERVENTIONAL RADIOLOGY/VASCULAR | Facility: CLINIC | Age: 24
End: 2024-05-13
Payer: COMMERCIAL

## 2024-05-13 NOTE — TELEPHONE ENCOUNTER
Writer has spoken with Johan regarding planned procedure with IR via telephone.      Johan acknowledges understanding of pre-procedure instructions.         I have provided Johan with IR number ([phone]) for questions or concerns.    Yanna LAMBERT  Interventional Radiology RN   [phone]

## 2024-05-14 ENCOUNTER — MYC MEDICAL ADVICE (OUTPATIENT)
Dept: FAMILY MEDICINE | Facility: CLINIC | Age: 24
End: 2024-05-14
Payer: COMMERCIAL

## 2024-05-14 DIAGNOSIS — E55.9 VITAMIN D DEFICIENCY: ICD-10-CM

## 2024-05-14 DIAGNOSIS — E11.9 TYPE 2 DIABETES MELLITUS WITHOUT COMPLICATION, WITHOUT LONG-TERM CURRENT USE OF INSULIN (H): ICD-10-CM

## 2024-05-14 DIAGNOSIS — E08.10 DIABETIC KETOACIDOSIS WITHOUT COMA ASSOCIATED WITH DIABETES MELLITUS DUE TO UNDERLYING CONDITION (H): ICD-10-CM

## 2024-05-14 DIAGNOSIS — E66.01 OBESITY, CLASS III, BMI 40-49.9 (MORBID OBESITY) (H): ICD-10-CM

## 2024-05-15 ENCOUNTER — TELEPHONE (OUTPATIENT)
Dept: FAMILY MEDICINE | Facility: CLINIC | Age: 24
End: 2024-05-15
Payer: COMMERCIAL

## 2024-05-15 ENCOUNTER — TELEPHONE (OUTPATIENT)
Dept: SURGERY | Facility: CLINIC | Age: 24
End: 2024-05-15
Payer: COMMERCIAL

## 2024-05-15 LAB
ACID FAST STAIN (ARUP): NORMAL

## 2024-05-15 RX ORDER — ERGOCALCIFEROL 1.25 MG/1
50000 CAPSULE, LIQUID FILLED ORAL WEEKLY
Qty: 12 CAPSULE | Refills: 2 | Status: SHIPPED | OUTPATIENT
Start: 2024-05-15

## 2024-05-15 NOTE — TELEPHONE ENCOUNTER
Called pt- pt with increasing pelvic pain x 3 hrs  Biopsy results not back yet  Tried NSAIDs without relief  Script sent Pharmacy transfer  Routing refill request to provider for review/approval because:  Labs not current:   Lab Results   Component Value Date    A1C 8.3 03/25/2024    A1C 9.0 03/08/2024    A1C 11.3 06/17/2020     Last office visit: 5/2/2024 ; last virtual visit: Visit date not found with prescribing provider:     Future Office Visit:      Julie Behrendt RN

## 2024-05-15 NOTE — TELEPHONE ENCOUNTER
Prescription approved per [facility] Refill Protocol.  Julie Behrendt RN

## 2024-05-15 NOTE — TELEPHONE ENCOUNTER
Called and spoke with patient.  He currently has a drain in place and is unsure if he can proceed with Colonoscopy with the drain in.  Patient unsure at this time when drain is going to come out as he has an issue that needs to resolve first, before drain can be removed.  Patient aware he needs to have colonoscopy, but due to drain is hesitant to schedule at time of call.  Explained to patient that we are ready to schedule whenever he is, provided my direct call back and instructed patient to call me when he is ready to set up procedure.  Patient expressed understanding and thanked for the call.  He will call back when ready.

## 2024-05-16 NOTE — TELEPHONE ENCOUNTER
Prior Authorization Retail Medication Request    Medication/Dose: ozempic  Diagnosis and ICD code (if different than what is on RX):    New/renewal/insurance change PA/secondary ins. PA:  Previously Tried and Failed:  Novolog; basaglar; semaglutide; latnus  Rationale:  patient has used since 7/2023    Insurance   Primary: [phone]  Insurance ID:  [id number]  [facility] Key: [id number]    Secondary (if applicable):  Insurance ID:      Pharmacy Information (if different than what is on RX)  Name:    Phone:    Fax:

## 2024-05-17 NOTE — PROGRESS NOTES
Interventional Radiology Pre-Procedure Assessment  Outpatient - [facility] - 05/20/24    Procedure requested: Abscessogram  Requesting provider: Shandra Mercado, DEBORAH, CNP    Brief HPI  Mynor Rollins is a 23 year old male with a pertinent past medical history of asthma and diabetes mellitus type II who was admitted to [facility] from 3/18 - 3/23/2024 for perforated diverticulitis with left lower quadrant fluid collection s/p drainage catheter placement on 3/13 and central pelvic fluid collection s/p drainage catheter placement on 3/19.     Left lower quadrant fluid collection culture grew Clostridium perfringens, Enterococcus faecalis, Enterococcus durans and Escherichia coli.  Central pelvic fluid collection culture without growth.  Discharged home on Augmentin.    Abscessogram history:  3/28: Left lower quadrant drain malpositioned, replaced. Colonic fistula noted.          Midline pelvic abscess drain malpositioned, replaced. No fistula.     4/8: Left pelvic drain with collapsed cavity. Persistent colonic fistula.         Midline pelvic drain with collapsed cavity. No fistula. Drain removed.     4/15: Left lower quadrant drain without significant residual fluid collection. Persistent enteric fistula.    4/29: Left lower quadrant drain without residual fluid collection. Persistent colonic fistula.    NPO since Midnight  ANTICOAGULANT(S): None    IMAGING  4/29/2024 ABSCESS TUBE CHECK  PROCEDURE:    A  image was obtained.  The pre-existing drainage catheter was injected with a small amount of contrast and multiple images were obtained. Based on the results of the study the drainage catheter was left in place.     FINDINGS:  No residual pericatheter fluid collection. Persistent colonic fistula. The drain was left in place.       IMPRESSION:    Persistent fistula.     PLAN:  Keep drain to gravity.  No flushing.  Patient will follow up in 2 weeks for repeat  "drain check.    ALLERGIES  No Known Allergies    LABORATORY VALUES  INR   Date Value Ref Range Status   03/13/2024 1.20 (H) 0.85 - 1.15 Final      Hemoglobin   Date Value Ref Range Status   03/23/2024 13.3 13.3 - 17.7 g/dL Final   06/18/2020 15.9 13.3 - 17.7 g/dL Final     Platelet Count   Date Value Ref Range Status   03/23/2024 557 (H) 150 - 450 10e3/uL Final   06/20/2020 196 150 - 450 10e9/L Final     Creatinine   Date Value Ref Range Status   03/25/2024 0.62 (L) 0.67 - 1.17 mg/dL Final   06/20/2020 0.57 0.50 - 1.00 mg/dL Final     Potassium   Date Value Ref Range Status   03/25/2024 4.2 3.4 - 5.3 mmol/L Final   06/20/2020 3.6 3.4 - 5.3 mmol/L Final     EXAM  Vital signs:  Temp: 98.2  F (36.8  C) Temp src: Oral BP: (!) 144/87 Pulse: 73   Resp: 16 SpO2: 100 % O2 Device: None (Room air)        Estimated body mass index is 47.27 kg/m  as calculated from the following:    Height as of 5/2/24: 1.697 m (5' 6.8\").    Weight as of 5/2/24: 136.1 kg (300 lb).    General: No apparent acute distress.  Respiratory: Normal depth and regular rhythm. Clear and equal throughout without adventitious sounds.  Cardiovascular: S1 & S2; regular rate and rhythm.  Drain: intact to gravity drainage; brown drainage noted in tubing/bag; no leakage noted.   Neurological: Alert and oriented. Thoughts coherent; speech clear and logical.  Psychiatric: Appropriate mood and affect.    PRE-SEDATION ASSESSMENT  Mallampati Airway Classification:  II - Faucial pillars and soft palate may be seen, but uvula is masked by the base of the tongue  Previous reaction to anesthesia/sedation:  No  Sedation plan based on assessment: No sedation  Moderate (conscious) sedation  ASA Classification: Class 2 - MILD SYSTEMIC DISEASE, NO ACUTE PROBLEMS, NO FUNCTIONAL LIMITATIONS.  Code Status: FULL CODE    ASSESSMENT  23 year old male recently admitted for perforated diverticulitis with fluid collections s/p drainage catheter placements. One drainage catheter " remains.    Denies fever, chills, nausea, vomiting, pain and/or leaking around drainage catheter.  No longer flushing.  Reports approximately 2-3 cc output/day.    PLAN  Perforated diverticulitis with fluid collections              - Abscessogram with possible intervention and sedation as needed.  - Procedural education reviewed with patient and family in detail including but not limited to risks, benefits and alternatives. Patient verbalized understanding.      Total time spent on the date of the encounter is 25 minutes.    Note comprised by: Carmen Zuniga CNP    Exam and procedure discussion completed by:   Josseline Shah CNP  Interventional Radiology

## 2024-05-20 ENCOUNTER — HOSPITAL ENCOUNTER (OUTPATIENT)
Dept: INTERVENTIONAL RADIOLOGY/VASCULAR | Facility: HOSPITAL | Age: 24
Discharge: HOME | End: 2024-05-20
Attending: NURSE PRACTITIONER | Admitting: RADIOLOGY
Payer: COMMERCIAL

## 2024-05-20 VITALS
HEART RATE: 73 BPM | TEMPERATURE: 98.2 F | DIASTOLIC BLOOD PRESSURE: 87 MMHG | SYSTOLIC BLOOD PRESSURE: 144 MMHG | RESPIRATION RATE: 16 BRPM | OXYGEN SATURATION: 100 %

## 2024-05-20 DIAGNOSIS — L98.8 FISTULA: ICD-10-CM

## 2024-05-20 PROCEDURE — 76080 X-RAY EXAM OF FISTULA: CPT

## 2024-05-20 PROCEDURE — 49424 ASSESS CYST CONTRAST INJECT: CPT

## 2024-05-20 RX ORDER — FLUMAZENIL 0.1 MG/ML
0.2 INJECTION, SOLUTION INTRAVENOUS
Status: DISCONTINUED | OUTPATIENT
Start: 2024-05-20 | End: 2024-05-21 | Stop reason: HOSPADM

## 2024-05-20 RX ORDER — NALOXONE HYDROCHLORIDE 0.4 MG/ML
0.4 INJECTION, SOLUTION INTRAMUSCULAR; INTRAVENOUS; SUBCUTANEOUS
Status: DISCONTINUED | OUTPATIENT
Start: 2024-05-20 | End: 2024-05-21 | Stop reason: HOSPADM

## 2024-05-20 RX ORDER — ONDANSETRON 2 MG/ML
4 INJECTION INTRAMUSCULAR; INTRAVENOUS
Status: DISCONTINUED | OUTPATIENT
Start: 2024-05-20 | End: 2024-05-21 | Stop reason: HOSPADM

## 2024-05-20 RX ORDER — NALOXONE HYDROCHLORIDE 0.4 MG/ML
0.2 INJECTION, SOLUTION INTRAMUSCULAR; INTRAVENOUS; SUBCUTANEOUS
Status: DISCONTINUED | OUTPATIENT
Start: 2024-05-20 | End: 2024-05-21 | Stop reason: HOSPADM

## 2024-05-20 RX ORDER — FENTANYL CITRATE 50 UG/ML
25-50 INJECTION, SOLUTION INTRAMUSCULAR; INTRAVENOUS EVERY 5 MIN PRN
Status: DISCONTINUED | OUTPATIENT
Start: 2024-05-20 | End: 2024-05-21 | Stop reason: HOSPADM

## 2024-05-20 NOTE — PROCEDURES
Interventional Radiology Post-Procedure Note   ?   Brief Procedure Note:   Patient name: Mynor Rollins  Pt MRN:[id number]   Date of procedure: 5/20/2024     Procedure(s): Abscess drain check  Sedation method: Moderate sedation was not employed.  Pre Procedure Diagnosis: Colonic fistula  Post Procedure Diagnosis: Same  Indications: Drain check   ?   Specimen(s) removed: None   Additional studies ordered: None  Drains: 10fr pigtail drain  Estimated Blood Loss: Minimal  Complications: None  Vascular closure method: N/A    Findings/Notes/Comments: Significant improvement in degree of communication with adjacent colon relative to prior drain check. Trace residual fistula seen.    Patient to return in 2 weeks for repeat check.   ?   Please see dictation in PACS or under the Imaging tab in [employer] for detailed procedure note.     Angel Mclaughlin M.D.   Vascular and Interventional Radiology   Pager: [phone]   After Hours / Scheduling: [phone]     5/20/2024  12:47 PM

## 2024-05-20 NOTE — PRE-PROCEDURE
GENERAL PRE-PROCEDURE:   Procedure:  Abscessogram  Date/Time:  5/20/2024 12:31 PM    Written consent obtained?: Yes    Risks and benefits: Risks, benefits and alternatives were discussed    Consent given by:  Patient  Patient states understanding of procedure being performed: Yes    Patient's understanding of procedure matches consent: Yes    Procedure consent matches procedure scheduled: Yes    Expected level of sedation:  Moderate  Appropriately NPO:  Yes  ASA Class:  2  Mallampati  :  Grade 2- soft palate, base of uvula, tonsillar pillars, and portion of posterior pharyngeal wall visible  Lungs:  Lungs clear with good breath sounds bilaterally  Heart:  Normal heart sounds and rate  History & Physical reviewed:  History and physical reviewed and no updates needed  Statement of review:  I have reviewed the lab findings, diagnostic data, medications, and the plan for sedation

## 2024-05-29 NOTE — TELEPHONE ENCOUNTER
Prior Authorization Approval    Medication: OZEMPIC (0.25 OR 0.5 MG/DOSE) 2 MG/3ML SC SOPN  Authorization Effective Date: 5/1/2024  Authorization Expiration Date: 5/29/2025  Approved Dose/Quantity:   Reference #:     Insurance Company:    Expected CoPay: $    CoPay Card Available:      Financial Assistance Needed:   Which Pharmacy is filling the prescription: [id number] - [place], [place] - [address] AT [facility]  Pharmacy Notified: Yes  Patient Notified:

## 2024-05-29 NOTE — TELEPHONE ENCOUNTER
Retail Pharmacy Prior Authorization Team   Phone: [phone]    PA Initiation    Medication: OZEMPIC (0.25 OR 0.5 MG/DOSE) 2 MG/3ML SC SOPN  Insurance Company:    Pharmacy Filling the Rx: [facility] #[id number] - [place], [place] - [address] AT [facility] [address]  Filling Pharmacy Phone: [phone]  Filling Pharmacy Fax: [phone]  Start Date: 5/29/2024

## 2024-05-30 ENCOUNTER — MYC MEDICAL ADVICE (OUTPATIENT)
Dept: INTERVENTIONAL RADIOLOGY/VASCULAR | Facility: CLINIC | Age: 24
End: 2024-05-30
Payer: COMMERCIAL

## 2024-06-03 NOTE — H&P
Interventional Radiology - History and Physical  Outpatient - [facility]  06/04/24    Procedure Requested: abscessogram  Requesting Provider: Josseline Shah CNP    HPI: Mynor Rollins is a 23 year old male with a pertinent past medical history of asthma and diabetes mellitus type II who was admitted to [facility] from 3/18 - 3/23/2024 for perforated diverticulitis with left lower quadrant fluid collection s/p drainage catheter placement on 3/13 and central pelvic fluid collection s/p drainage catheter placement on 3/19.     Left lower quadrant fluid collection culture grew Clostridium perfringens, Enterococcus faecalis, Enterococcus durans and Escherichia coli.  Central pelvic fluid collection culture without growth.  Discharged home on Augmentin.     Abscessogram history:  3/28: Left lower quadrant drain malpositioned, replaced. Colonic fistula noted.          Midline pelvic abscess drain malpositioned, replaced. No fistula.     4/8: Left pelvic drain with collapsed cavity. Persistent colonic fistula.         Midline pelvic drain with collapsed cavity. No fistula. Drain removed.     4/15: Left lower quadrant drain without significant residual fluid collection. Persistent enteric fistula.     4/29: Left lower quadrant drain without residual fluid collection. Persistent colonic fistula.       Last abscessogram performed 5/20/24 and demonstrated significant improvement in colonic fistula with trace residual communication. Patient returns today for routine abscessogram. Patient reports some leakage from his drain insertion site, which is not new. Denies pain, fever/chills.     Flushing: none  Drainage output: 1ml/day    Imaging:   [facility]  LOCATION: [facility]  DATE: 5/20/2024     PROCEDURE: ABSCESS TUBE CHECK     INTERVENTIONAL RADIOLOGIST: Angel Mclaughlin MD.     INDICATION: 23-year-old male with a history of diverticulitis  complicated by colonic fistula who presents for follow-up drain check. Patient reports approximately 1-2 cc of brown fluid out daily.     CONSENT: The risks, benefits and alternatives of an abscessogram with possible exchange, manipulation or removal were discussed with the patient  in detail. All questions were answered. Informed consent was given to proceed with the procedure.     MODERATE SEDATION: None.     CONTRAST: 5 mL Omnipaque into the abscess cavity.   ANTIBIOTICS: None.  ADDITIONAL MEDICATIONS: None.     FLUOROSCOPIC TIME: 0.9 minutes.  RADIATION DOSE: Air Kerma: 31 mGy.     COMPLICATIONS: No immediate complications.     STERILE BARRIER TECHNIQUE: Maximum sterile barrier technique was used. Cutaneous antisepsis was performed at the operative site with application of 2% chlorhexidine and large sterile drape. Prior to the procedure, the  and assistant performed   hand hygiene and wore hat, mask, sterile gown, and sterile gloves during the entire procedure.     PROCEDURE:    A  image was obtained.     The pre-existing drainage catheter was injected with a small amount of contrast and multiple images were obtained. Based on the results of the study the drainage catheter was left in place.     FINDINGS:  Significantly improved colonic fistula with no residual pericatheter fluid collection. There is only trace communication with adjacent colon at this time. The drain was left in place.                                                                      IMPRESSION:    Significant improvement in colonic fistula with trace residual communication.     PLAN:  Drain to gravity drainage.  No flushing.  Patient to return in 2 weeks for repeat drain check.    NPO Status: midnight   Anticoagulation/Antiplatelets/Bleeding tendencies: none  Antibiotics: none for IR    Review of Systems: A comprehensive 10-point review of systems was performed. All systems were reviewed and negative with exception to those  reported in the HPI.    PMH:  Past Medical History:   Diagnosis Date    Diabetes mellitus, type 2 (H)     Diverticulitis of colon     Intra-abdominal abscess (H)     Obesity, Class III, BMI 40-49.9 (morbid obesity) (H)     Uncomplicated asthma 2/26/06    Went away as i got older       Patient Active Problem List   Diagnosis    DKA (diabetic ketoacidoses)    Morbid obesity (H)    Newly diagnosed diabetes (H)    Elevated d-dimer    Diverticulitis of large intestine with abscess without bleeding    Type 1 diabetes mellitus (H)    Diverticulitis of large intestine with abscess       PSH:  Past Surgical History:   Procedure Laterality Date    ABDOMEN SURGERY         FAMILY Hx:  Family History   Problem Relation Age of Onset    Diabetes Type 2  Father     Diabetes Father     Cerebrovascular Disease Father     Diabetes Paternal Grandmother     Asthma Brother         SOCIAL Hx:  Social History     Socioeconomic History    Marital status: Single   Tobacco Use    Smoking status: Never    Smokeless tobacco: Never   Vaping Use    Vaping status: Never Used   Substance and Sexual Activity    Alcohol use: Not Currently     Comment: Occasionally    Drug use: Never    Sexual activity: Never     Social Determinants of Health     Financial Resource Strain: Low Risk  (5/2/2024)    Financial Resource Strain     Within the past 12 months, have you or your family members you live with been unable to get utilities (heat, electricity) when it was really needed?: No   Food Insecurity: Low Risk  (5/2/2024)    Food Insecurity     Within the past 12 months, did you worry that your food would run out before you got money to buy more?: No     Within the past 12 months, did the food you bought just not last and you didn t have money to get more?: No   Transportation Needs: Low Risk  (5/2/2024)    Transportation Needs     Within the past 12 months, has lack of transportation kept you from medical appointments, getting your medicines, non-medical  meetings or appointments, work, or from getting things that you need?: No   Interpersonal Safety: Low Risk  (5/2/2024)    Interpersonal Safety     Do you feel physically and emotionally safe where you currently live?: Yes     Within the past 12 months, have you been hit, slapped, kicked or otherwise physically hurt by someone?: No     Within the past 12 months, have you been humiliated or emotionally abused in other ways by your partner or ex-partner?: No   Housing Stability: High Risk (5/2/2024)    Housing Stability     Do you have housing? : No     Are you worried about losing your housing?: No        ALLERGIES:  No Known Allergies    MEDICATIONS:  Current Outpatient Medications   Medication Sig Dispense Refill    blood glucose (NO BRAND SPECIFIED) lancets standard Use to test blood sugar 4 times daily or as directed. 200 each 0    blood glucose (NO BRAND SPECIFIED) test strip Use to test blood sugar 4 times daily or as directed. 200 strip 1    blood glucose monitoring (NO BRAND SPECIFIED) meter device kit Use to test blood sugar 4 times daily or as directed. 1 kit 0    insulin pen needle (32G X 4 MM) 32G X 4 MM miscellaneous Use 1 pen needle weekly or as directed. 200 each 1    Semaglutide, 2 MG/DOSE, (OZEMPIC) 8 MG/3ML pen Inject 2 mg Subcutaneous every 7 days 9 mL 3    vitamin D2 (ERGOCALCIFEROL) 30473 units (1250 mcg) capsule Take 1 capsule (50,000 Units) by mouth once a week 12 capsule 2     No current facility-administered medications for this encounter.         LABS:  INR   Date Value Ref Range Status   03/13/2024 1.20 (H) 0.85 - 1.15 Final      Hemoglobin   Date Value Ref Range Status   03/23/2024 13.3 13.3 - 17.7 g/dL Final   06/18/2020 15.9 13.3 - 17.7 g/dL Final     Platelet Count   Date Value Ref Range Status   03/23/2024 557 (H) 150 - 450 10e3/uL Final   06/20/2020 196 150 - 450 10e9/L Final     Creatinine   Date Value Ref Range Status   03/25/2024 0.62 (L) 0.67 - 1.17 mg/dL Final   06/20/2020 0.57  "0.50 - 1.00 mg/dL Final     Potassium   Date Value Ref Range Status   03/25/2024 4.2 3.4 - 5.3 mmol/L Final   06/20/2020 3.6 3.4 - 5.3 mmol/L Final         EXAM:  Vital signs:  Temp: 98.5  F (36.9  C)   BP: 121/72 Pulse: 110     SpO2: 96 % O2 Device: None (Room air)        Estimated body mass index is 47.27 kg/m  as calculated from the following:    Height as of 5/2/24: 1.697 m (5' 6.8\").    Weight as of 5/2/24: 136.1 kg (300 lb).    General: Stable. In no acute distress.    Neurologic: Alert and oriented x 3. No focal deficits.  Psychiatric: Appropriate mood and affect. Cooperative. Answering questions appropriately. Linear/coherent thought process.   Respiratory: Normal respirations on room air. Lungs clear to auscultation bilaterally.  Cardiac: S1S2, regular rate and rhythm, without murmur, clicks or rubs.  Drain: intact to gravity drainage; brown drainage noted in tubing/bag; no leakage noted.       PRE-SEDATION ASSESSMENT  Mallampati Airway Classification:  II - Faucial pillars and soft palate may be seen, but uvula is masked by the base of the tongue  Previous reaction to anesthesia/sedation:  No  Sedation plan based on assessment: No sedation  Moderate (conscious) sedation  ASA Classification: Class 2 - MILD SYSTEMIC DISEASE, NO ACUTE PROBLEMS, NO FUNCTIONAL LIMITATIONS.  Code Status: FULL CODE      ASSESSMENT/PLAN:    Abscessogram with sedation and possible reposition, exchange, and/or removal of tubing.     Procedure, risks/benefits, details, alternatives, and sedation reviewed with patient/family and verbalized understanding. All questions answered. OK to proceed with above radiology procedure.     Total time spent on the date of the encounter: 25 minutes.        DEBORAH Richardson CNP  Interventional Radiology  [phone]    "

## 2024-06-04 ENCOUNTER — HOSPITAL ENCOUNTER (OUTPATIENT)
Dept: INTERVENTIONAL RADIOLOGY/VASCULAR | Facility: HOSPITAL | Age: 24
Discharge: HOME | End: 2024-06-04
Attending: NURSE PRACTITIONER | Admitting: RADIOLOGY
Payer: COMMERCIAL

## 2024-06-04 VITALS
SYSTOLIC BLOOD PRESSURE: 121 MMHG | TEMPERATURE: 98.5 F | DIASTOLIC BLOOD PRESSURE: 72 MMHG | OXYGEN SATURATION: 96 % | HEART RATE: 110 BPM

## 2024-06-04 DIAGNOSIS — L98.8 FISTULA: ICD-10-CM

## 2024-06-04 LAB — GLUCOSE BLDC GLUCOMTR-MCNC: 220 MG/DL (ref 70–99)

## 2024-06-04 PROCEDURE — 76080 X-RAY EXAM OF FISTULA: CPT

## 2024-06-04 PROCEDURE — 82962 GLUCOSE BLOOD TEST: CPT

## 2024-06-04 PROCEDURE — 255N000002 HC RX 255 OP 636: Performed by: RADIOLOGY

## 2024-06-04 PROCEDURE — 49424 ASSESS CYST CONTRAST INJECT: CPT

## 2024-06-04 RX ORDER — NALOXONE HYDROCHLORIDE 0.4 MG/ML
0.2 INJECTION, SOLUTION INTRAMUSCULAR; INTRAVENOUS; SUBCUTANEOUS
Status: DISCONTINUED | OUTPATIENT
Start: 2024-06-04 | End: 2024-06-05 | Stop reason: HOSPADM

## 2024-06-04 RX ORDER — FENTANYL CITRATE 50 UG/ML
25-50 INJECTION, SOLUTION INTRAMUSCULAR; INTRAVENOUS EVERY 5 MIN PRN
Status: DISCONTINUED | OUTPATIENT
Start: 2024-06-04 | End: 2024-06-05 | Stop reason: HOSPADM

## 2024-06-04 RX ORDER — ONDANSETRON 2 MG/ML
4 INJECTION INTRAMUSCULAR; INTRAVENOUS
Status: DISCONTINUED | OUTPATIENT
Start: 2024-06-04 | End: 2024-06-05 | Stop reason: HOSPADM

## 2024-06-04 RX ORDER — NALOXONE HYDROCHLORIDE 0.4 MG/ML
0.4 INJECTION, SOLUTION INTRAMUSCULAR; INTRAVENOUS; SUBCUTANEOUS
Status: DISCONTINUED | OUTPATIENT
Start: 2024-06-04 | End: 2024-06-05 | Stop reason: HOSPADM

## 2024-06-04 RX ORDER — FLUMAZENIL 0.1 MG/ML
0.2 INJECTION, SOLUTION INTRAVENOUS
Status: DISCONTINUED | OUTPATIENT
Start: 2024-06-04 | End: 2024-06-05 | Stop reason: HOSPADM

## 2024-06-04 RX ADMIN — IOHEXOL 2 ML: 350 INJECTION, SOLUTION INTRAVENOUS at 15:07

## 2024-06-04 NOTE — IR NOTE
Patient Name: Mynor Rollins  Medical Record Number: [id number]  Today's Date: 6/4/2024    Procedure: Abscessogram  Proceduralist: Mya    Sedation medications administered: 0 mg midazolam and 0 mcg fentanyl   Sedation time: 0 minutes        Other Notes: Pt verbalizes understanding of drain removal discharge instructions

## 2024-06-04 NOTE — DISCHARGE INSTRUCTIONS
Drain/Tube Removal Discharge Instructions:  Please follow the below instructions following your drain/tube removal.    Care Instructions after drain/tube removal:  - OK to shower after removal of your drain/tube.  - Avoid stagnant water such as tub baths, Jacuzzis and pools for 3 days after drain/tube removal.  - Keep previous drain/tube exit site covered with dressing of your preference until drainage stops. Change dressings daily and as needed to keep site dry and clean.  - Expect drainage will stop in approximately 3 days time.    Seek medical evaluation for the following:  - Fever (greater than 101 F (38.3C)).  - Purulent (yellow/green/foul smelling) drainage from previous drain exit site.  - Develop pain at or near the previous drain exit site.  - Persistent drainage lasting longer than three days from previous drain exit site.

## 2024-06-04 NOTE — PRE-PROCEDURE
GENERAL PRE-PROCEDURE:   Procedure:  Abscessogram  Date/Time:  6/4/2024 2:38 PM    Written consent obtained?: Yes    Risks and benefits: Risks, benefits and alternatives were discussed    Consent given by:  Patient  Patient states understanding of procedure being performed: Yes    Patient's understanding of procedure matches consent: Yes    Procedure consent matches procedure scheduled: Yes    Expected level of sedation:  Moderate  Appropriately NPO:  Yes  ASA Class:  2  Mallampati  :  Grade 2- soft palate, base of uvula, tonsillar pillars, and portion of posterior pharyngeal wall visible  Lungs:  Lungs clear with good breath sounds bilaterally  Heart:  Normal heart sounds and rate  History & Physical reviewed:  History and physical reviewed and no updates needed  Statement of review:  I have reviewed the lab findings, diagnostic data, medications, and the plan for sedation

## 2024-07-14 ENCOUNTER — HEALTH MAINTENANCE LETTER (OUTPATIENT)
Age: 24
End: 2024-07-14

## 2024-12-01 ENCOUNTER — HEALTH MAINTENANCE LETTER (OUTPATIENT)
Age: 24
End: 2024-12-01

## 2025-03-15 ENCOUNTER — HEALTH MAINTENANCE LETTER (OUTPATIENT)
Age: 25
End: 2025-03-15

## 2025-05-17 ENCOUNTER — HEALTH MAINTENANCE LETTER (OUTPATIENT)
Age: 25
End: 2025-05-17

## 2025-05-31 DIAGNOSIS — E11.9 TYPE 2 DIABETES MELLITUS WITHOUT COMPLICATION, WITHOUT LONG-TERM CURRENT USE OF INSULIN (H): ICD-10-CM

## 2025-05-31 DIAGNOSIS — E66.813 OBESITY, CLASS III, BMI 40-49.9 (MORBID OBESITY) (H): ICD-10-CM

## 2025-06-02 RX ORDER — SEMAGLUTIDE 2.68 MG/ML
INJECTION, SOLUTION SUBCUTANEOUS
Qty: 9 ML | Refills: 3 | Status: SHIPPED | OUTPATIENT
Start: 2025-06-02

## 2025-06-28 ENCOUNTER — HEALTH MAINTENANCE LETTER (OUTPATIENT)
Age: 25
End: 2025-06-28

## (undated) RX ORDER — FENTANYL CITRATE 50 UG/ML
INJECTION, SOLUTION INTRAMUSCULAR; INTRAVENOUS
Status: DISPENSED
Start: 2024-03-19

## (undated) RX ORDER — FENTANYL CITRATE 50 UG/ML
INJECTION, SOLUTION INTRAMUSCULAR; INTRAVENOUS
Status: DISPENSED
Start: 2024-03-28

## (undated) RX ORDER — FENTANYL CITRATE 50 UG/ML
INJECTION, SOLUTION INTRAMUSCULAR; INTRAVENOUS
Status: DISPENSED
Start: 2024-03-13

## (undated) RX ORDER — LIDOCAINE HYDROCHLORIDE 10 MG/ML
INJECTION, SOLUTION INFILTRATION; PERINEURAL
Status: DISPENSED
Start: 2024-03-19

## (undated) RX ORDER — LIDOCAINE HYDROCHLORIDE 10 MG/ML
INJECTION, SOLUTION INFILTRATION; PERINEURAL
Status: DISPENSED
Start: 2024-03-28

## (undated) RX ORDER — LIDOCAINE HYDROCHLORIDE 10 MG/ML
INJECTION, SOLUTION INFILTRATION; PERINEURAL
Status: DISPENSED
Start: 2024-03-13